# Patient Record
Sex: FEMALE | Race: WHITE | Employment: OTHER | URBAN - METROPOLITAN AREA
[De-identification: names, ages, dates, MRNs, and addresses within clinical notes are randomized per-mention and may not be internally consistent; named-entity substitution may affect disease eponyms.]

---

## 2017-06-01 ENCOUNTER — OFFICE VISIT (OUTPATIENT)
Dept: FAMILY MEDICINE CLINIC | Age: 82
End: 2017-06-01
Payer: COMMERCIAL

## 2017-06-01 VITALS
DIASTOLIC BLOOD PRESSURE: 86 MMHG | HEIGHT: 62 IN | HEART RATE: 70 BPM | OXYGEN SATURATION: 97 % | WEIGHT: 189.4 LBS | SYSTOLIC BLOOD PRESSURE: 134 MMHG | BODY MASS INDEX: 34.85 KG/M2

## 2017-06-01 DIAGNOSIS — G89.29 CHRONIC LEFT SI JOINT PAIN: ICD-10-CM

## 2017-06-01 DIAGNOSIS — M79.645 THUMB PAIN, LEFT: Primary | ICD-10-CM

## 2017-06-01 DIAGNOSIS — M53.3 CHRONIC LEFT SI JOINT PAIN: ICD-10-CM

## 2017-06-01 PROCEDURE — 99213 OFFICE O/P EST LOW 20 MIN: CPT | Performed by: FAMILY MEDICINE

## 2017-06-01 RX ORDER — PREDNISONE 50 MG/1
50 TABLET ORAL DAILY
Qty: 7 TABLET | Refills: 0 | Status: SHIPPED | OUTPATIENT
Start: 2017-06-01 | End: 2017-06-08

## 2017-06-01 ASSESSMENT — ENCOUNTER SYMPTOMS
RHINORRHEA: 0
EYE DISCHARGE: 0
WHEEZING: 0
COLOR CHANGE: 0
PHOTOPHOBIA: 0
ABDOMINAL DISTENTION: 0
SORE THROAT: 0
CONSTIPATION: 0
ABDOMINAL PAIN: 0
CHEST TIGHTNESS: 0
NAUSEA: 0
EYE REDNESS: 0
VOICE CHANGE: 0
BACK PAIN: 1
EYE PAIN: 0
EYE ITCHING: 0
SHORTNESS OF BREATH: 0
DIARRHEA: 0
FACIAL SWELLING: 0
COUGH: 0
BLOOD IN STOOL: 0
VOMITING: 0
SINUS PRESSURE: 0

## 2017-06-01 ASSESSMENT — PATIENT HEALTH QUESTIONNAIRE - PHQ9
SUM OF ALL RESPONSES TO PHQ9 QUESTIONS 1 & 2: 0
2. FEELING DOWN, DEPRESSED OR HOPELESS: 0
1. LITTLE INTEREST OR PLEASURE IN DOING THINGS: 0
SUM OF ALL RESPONSES TO PHQ QUESTIONS 1-9: 0

## 2017-06-21 RX ORDER — OMEPRAZOLE 20 MG/1
20 CAPSULE, DELAYED RELEASE ORAL DAILY
Qty: 90 CAPSULE | Refills: 3 | Status: SHIPPED | OUTPATIENT
Start: 2017-06-21 | End: 2018-02-23 | Stop reason: SDUPTHER

## 2017-06-28 LAB
CHOLESTEROL, TOTAL: 127 MG/DL
CHOLESTEROL/HDL RATIO: 3.7
HDLC SERPL-MCNC: 34 MG/DL (ref 35–70)
LDL CHOLESTEROL CALCULATED: 68 MG/DL (ref 0–160)
TRIGL SERPL-MCNC: 126 MG/DL
VLDLC SERPL CALC-MCNC: 25 MG/DL

## 2017-12-06 DIAGNOSIS — Z00.00 ROUTINE ADULT HEALTH MAINTENANCE: ICD-10-CM

## 2017-12-06 DIAGNOSIS — M19.90 OSTEOARTHRITIS, UNSPECIFIED OSTEOARTHRITIS TYPE, UNSPECIFIED SITE: ICD-10-CM

## 2017-12-06 DIAGNOSIS — Z13.820 SCREENING FOR OSTEOPOROSIS: ICD-10-CM

## 2017-12-06 DIAGNOSIS — Z12.31 SCREENING MAMMOGRAM, ENCOUNTER FOR: ICD-10-CM

## 2017-12-06 DIAGNOSIS — Z92.89 HX OF MAMMOGRAM: Primary | ICD-10-CM

## 2017-12-06 DIAGNOSIS — M89.9 DISORDER OF BONE: ICD-10-CM

## 2018-01-12 DIAGNOSIS — M19.90 OSTEOARTHRITIS, UNSPECIFIED OSTEOARTHRITIS TYPE, UNSPECIFIED SITE: ICD-10-CM

## 2018-01-12 DIAGNOSIS — Z13.820 SCREENING FOR OSTEOPOROSIS: ICD-10-CM

## 2018-01-12 DIAGNOSIS — M89.9 DISORDER OF BONE: ICD-10-CM

## 2018-01-12 DIAGNOSIS — Z00.00 ROUTINE ADULT HEALTH MAINTENANCE: ICD-10-CM

## 2018-02-26 RX ORDER — OMEPRAZOLE 20 MG/1
CAPSULE, DELAYED RELEASE ORAL
Qty: 90 CAPSULE | Refills: 3 | Status: SHIPPED | OUTPATIENT
Start: 2018-02-26 | End: 2019-02-06 | Stop reason: SDUPTHER

## 2018-07-26 ENCOUNTER — OFFICE VISIT (OUTPATIENT)
Dept: FAMILY MEDICINE CLINIC | Age: 83
End: 2018-07-26
Payer: COMMERCIAL

## 2018-07-26 VITALS
HEART RATE: 76 BPM | DIASTOLIC BLOOD PRESSURE: 74 MMHG | HEIGHT: 62 IN | OXYGEN SATURATION: 96 % | BODY MASS INDEX: 33.71 KG/M2 | WEIGHT: 183.2 LBS | SYSTOLIC BLOOD PRESSURE: 126 MMHG

## 2018-07-26 DIAGNOSIS — M19.90 OSTEOARTHRITIS, UNSPECIFIED OSTEOARTHRITIS TYPE, UNSPECIFIED SITE: ICD-10-CM

## 2018-07-26 DIAGNOSIS — Z00.00 MEDICARE ANNUAL WELLNESS VISIT, SUBSEQUENT: Primary | ICD-10-CM

## 2018-07-26 DIAGNOSIS — Z51.81 MEDICATION MONITORING ENCOUNTER: ICD-10-CM

## 2018-07-26 DIAGNOSIS — Z00.00 ROUTINE GENERAL MEDICAL EXAMINATION AT A HEALTH CARE FACILITY: ICD-10-CM

## 2018-07-26 DIAGNOSIS — E78.2 MIXED HYPERLIPIDEMIA: ICD-10-CM

## 2018-07-26 PROCEDURE — G0438 PPPS, INITIAL VISIT: HCPCS | Performed by: FAMILY MEDICINE

## 2018-07-26 RX ORDER — MELOXICAM 7.5 MG/1
7.5 TABLET ORAL DAILY
Qty: 90 TABLET | Refills: 3 | Status: SHIPPED | OUTPATIENT
Start: 2018-07-26 | End: 2019-07-25 | Stop reason: SDUPTHER

## 2018-07-26 ASSESSMENT — ENCOUNTER SYMPTOMS
VOICE CHANGE: 0
ABDOMINAL DISTENTION: 0
CONSTIPATION: 0
VOMITING: 0
WHEEZING: 0
CHEST TIGHTNESS: 0
NAUSEA: 0
DIARRHEA: 0
COUGH: 0
ABDOMINAL PAIN: 0
EYE PAIN: 0
SHORTNESS OF BREATH: 0
PHOTOPHOBIA: 0
EYE ITCHING: 0
BACK PAIN: 0
COLOR CHANGE: 0
BLOOD IN STOOL: 0
EYE REDNESS: 0
EYE DISCHARGE: 0
SORE THROAT: 0
SINUS PRESSURE: 0
FACIAL SWELLING: 0
RHINORRHEA: 0

## 2018-07-26 ASSESSMENT — PATIENT HEALTH QUESTIONNAIRE - PHQ9
2. FEELING DOWN, DEPRESSED OR HOPELESS: 0
1. LITTLE INTEREST OR PLEASURE IN DOING THINGS: 0
SUM OF ALL RESPONSES TO PHQ9 QUESTIONS 1 & 2: 0
SUM OF ALL RESPONSES TO PHQ QUESTIONS 1-9: 0

## 2018-07-26 NOTE — PROGRESS NOTES
Subjective:      Patient ID: Yared Swenson is a 80 y.o. female. HPI  Here annual well check and has been generally well and healthy aside from her back pain which has been chronic and is worse when she has been on her feet for long periods. She has been having some limitations in her ADLs because of the back pain and has not had much benefit from PT, Yoga, and pool therapy over the past few weeks. She really doesn't see much relief from tylenol over the counter. Sleep has been restful and she has been maintaining a reasonable diet and has been staying active and well hydrated. Review of Systems   Constitutional: Negative for activity change, appetite change, chills, diaphoresis, fatigue, fever and unexpected weight change. HENT: Negative for congestion, ear pain, facial swelling, hearing loss, postnasal drip, rhinorrhea, sinus pressure, sore throat and voice change. Eyes: Negative for photophobia, pain, discharge, redness, itching and visual disturbance. Respiratory: Negative for cough, chest tightness, shortness of breath and wheezing. Cardiovascular: Negative for chest pain and palpitations. Gastrointestinal: Negative for abdominal distention, abdominal pain, blood in stool, constipation, diarrhea, nausea and vomiting. Endocrine: Negative for cold intolerance and heat intolerance. Genitourinary: Negative for decreased urine volume, difficulty urinating, dysuria, flank pain, frequency, hematuria, pelvic pain, urgency, vaginal bleeding and vaginal discharge. Musculoskeletal: Negative for arthralgias, back pain, gait problem, joint swelling, myalgias, neck pain and neck stiffness. Skin: Negative for color change, pallor and rash. Allergic/Immunologic: Negative for environmental allergies and food allergies. Neurological: Negative for dizziness, tremors, seizures, syncope, facial asymmetry, speech difficulty, weakness, numbness and headaches.    Psychiatric/Behavioral: psychosocial and functional/safety risks, and cognitive dysfunction are all negative except as indicated below. These results, as well as other patient data from the [address] form, are documented in Flowsheets linked to this Encounter. No Known Allergies  Prior to Visit Medications    Medication Sig Taking? Authorizing Provider   meloxicam (MOBIC) 7.5 MG tablet Take 1 tablet by mouth daily Yes Adi Magdaleno MD   omeprazole (PRILOSEC) 20 MG delayed release capsule take 1 capsule by mouth once daily Yes Adi Magdaleno MD   atorvastatin (LIPITOR) 20 MG tablet Take 1 tablet by mouth nightly Yes Historical Provider, MD   metoprolol tartrate (LOPRESSOR) 25 MG tablet Take 1 tablet by mouth 2 times daily Yes Historical Provider, MD   MULTIPLE VITAMIN PO Take  by mouth daily. Yes Historical Provider, MD   calcium carbonate (OSCAL) 500 MG TABS tablet Take 500 mg by mouth daily. Yes Historical Provider, MD     No past medical history on file. Past Surgical History:   Procedure Laterality Date    MITRAL VALVE SURGERY  3/14/2016    [facility]/Dr. Syeda Schmidt     No family history on file.     CareTeam (Including outside providers/suppliers regularly involved in providing care):   Patient Care Team:  Adi Magdaleno MD as PCP - General    Wt Readings from Last 3 Encounters:   07/26/18 183 lb 3.2 oz (83.1 kg)   06/01/17 189 lb 6.4 oz (85.9 kg)   08/17/16 178 lb 6.4 oz (80.9 kg)     Vitals:    07/26/18 1129   BP: 126/74   Pulse: 76   SpO2: 96%   Weight: 183 lb 3.2 oz (83.1 kg)   Height: 5' 2\" (1.575 m)       General Appearance: alert and oriented to person, place and time, well developed and well- nourished, in no acute distress  Skin: warm and dry, no rash or erythema  Head: normocephalic and atraumatic  Eyes: pupils equal, round, and reactive to light, extraocular eye movements intact, conjunctivae normal  ENT: tympanic membrane, external ear and ear canal normal bilaterally, nose without deformity, nasal mucosa

## 2018-08-01 LAB
ALBUMIN SERPL-MCNC: 4.2 G/DL
ALP BLD-CCNC: 71 U/L
ALT SERPL-CCNC: 22 U/L
ANION GAP SERPL CALCULATED.3IONS-SCNC: 9 MMOL/L
AST SERPL-CCNC: 35 U/L
BILIRUB SERPL-MCNC: 0.6 MG/DL (ref 0.1–1.4)
BUN BLDV-MCNC: 16 MG/DL
CALCIUM SERPL-MCNC: 9.5 MG/DL
CHLORIDE BLD-SCNC: 102 MMOL/L
CHOLESTEROL, TOTAL: 122 MG/DL
CHOLESTEROL/HDL RATIO: 2.9
CO2: 28 MMOL/L
CREAT SERPL-MCNC: 0.94 MG/DL
GFR CALCULATED: NORMAL
GLUCOSE BLD-MCNC: 99 MG/DL
HDLC SERPL-MCNC: 42 MG/DL (ref 35–70)
LDL CHOLESTEROL CALCULATED: 64 MG/DL (ref 0–160)
POTASSIUM SERPL-SCNC: 4.7 MMOL/L
SODIUM BLD-SCNC: 139 MMOL/L
TOTAL PROTEIN: 6.9
TRIGL SERPL-MCNC: 80 MG/DL
TSH SERPL DL<=0.05 MIU/L-ACNC: 2.24 UIU/ML
VLDLC SERPL CALC-MCNC: 16 MG/DL

## 2018-08-06 DIAGNOSIS — Z00.00 MEDICARE ANNUAL WELLNESS VISIT, SUBSEQUENT: ICD-10-CM

## 2018-08-06 DIAGNOSIS — Z51.81 MEDICATION MONITORING ENCOUNTER: ICD-10-CM

## 2018-08-06 DIAGNOSIS — E78.2 MIXED HYPERLIPIDEMIA: ICD-10-CM

## 2019-02-06 RX ORDER — OMEPRAZOLE 20 MG/1
CAPSULE, DELAYED RELEASE ORAL
Qty: 90 CAPSULE | Refills: 3 | Status: SHIPPED | OUTPATIENT
Start: 2019-02-06 | End: 2020-04-21 | Stop reason: SDUPTHER

## 2019-04-15 ENCOUNTER — OFFICE VISIT (OUTPATIENT)
Dept: FAMILY MEDICINE CLINIC | Age: 84
End: 2019-04-15
Payer: COMMERCIAL

## 2019-04-15 VITALS
HEART RATE: 59 BPM | OXYGEN SATURATION: 98 % | SYSTOLIC BLOOD PRESSURE: 180 MMHG | BODY MASS INDEX: 32.34 KG/M2 | WEIGHT: 176.8 LBS | DIASTOLIC BLOOD PRESSURE: 74 MMHG

## 2019-04-15 DIAGNOSIS — Z01.818 PREOPERATIVE GENERAL PHYSICAL EXAMINATION: Primary | ICD-10-CM

## 2019-04-15 PROCEDURE — G0439 PPPS, SUBSEQ VISIT: HCPCS | Performed by: NURSE PRACTITIONER

## 2019-04-15 ASSESSMENT — PATIENT HEALTH QUESTIONNAIRE - PHQ9
SUM OF ALL RESPONSES TO PHQ9 QUESTIONS 1 & 2: 0
1. LITTLE INTEREST OR PLEASURE IN DOING THINGS: 0
2. FEELING DOWN, DEPRESSED OR HOPELESS: 0
SUM OF ALL RESPONSES TO PHQ QUESTIONS 1-9: 0
SUM OF ALL RESPONSES TO PHQ QUESTIONS 1-9: 0

## 2019-04-15 NOTE — PROGRESS NOTES
Jeff Lawrence, MAURA-CRISTINA  [address]  [id number] [facility] [address] [facility]  [phone]  [phone]    Christie Nieto is a 80 y.o. female who is here with c/o of:    Chief Complaint: Pre-op Exam (Right knee replacement surgery 5/2/19 @[place] [facility] w/ Santana Riggins)      Patient Accompanied by: patient    HPI - Christie Nieto is here today for pre-op physical examination:    Patient is scheduled to have right knee surgery scheduled for 5/2/2019. She reports appointment with cardiology - Dr. Radha Rosario tomorrow- 4/18 for cardiac clearance. She has appointment for pre-op visit for labs and ekg within the next week. Patient has long standing hx of right knee pain caused by osteoarthritis and is under the care of America Sorenson MD-orthopaedics. She has the following chronic conditions hypertension, GERD, osteoarthritis. She is under the care of cardiology for her hypertension, which is currently controlled. She reports heart burn that is controlled and is taking omeprazole 20mg. Patient Active Problem List:     OA (osteoarthritis)     HTN (hypertension)     GERD (gastroesophageal reflux disease)     Gastritis     No past medical history on file. Past Surgical History:   Procedure Laterality Date    MITRAL VALVE SURGERY  3/14/2016    [facility]/Dr. Meet Bean     No family history on file. Social History     Tobacco Use    Smoking status: Never Smoker    Smokeless tobacco: Never Used   Substance Use Topics    Alcohol use: No     ALLERGIES:  No Known Allergies       Subjective     · Constitutional:  Negative for activity change, appetite change,unexpected weight change, chills, fever, and fatigue. · HENT: Negative for ear pain, sore throat,  Rhinorrhea, sinus pain, sinus pressure, congestion. · Eyes:  Negative for pain and discharge. · Respiratory:  Negative for chest tightness, shortness of breath, wheezing, and cough.     · Cardiovascular:  Negative for chest pain, palpitations and leg swelling. · Gastrointestinal: Negative for abdominal pain, blood in stool, constipation,diarrhea, nausea and vomiting. Positive for heart burn  · Endocrine: Negative for cold intolerance, heat intolerance, polydipsia, polyphagia and polyuria. · Genitourinary: Negative for difficulty urinating, dysuria, flank pain, frequency, hematuria and urgency. · Musculoskeletal: Negative for arthralgias, back pain, joint swelling, myalgias, neck pain and neck stiffness. Positive for right knee pain  · Skin: Negative for rash and wound. · Allergic/Immunologic: Negative for environmental allergies and food allergies. · Neurological:  Negative for dizziness, light-headedness, numbness and headaches. · Hematological:  Negative for adenopathy. Does not bruise/bleed easily. · Psychiatric/Behavioral: Negative for self-injury, sleep disturbance and suicidal ideas. Objective     PHYSICAL EXAM:   · Constitutional: Jourdan Antunez is oriented to person, place, and time. Vital signs are normal. Appears well-developed and well-nourished. · HEENT:   · Head: Normocephalic and atraumatic. Right Ear: Hearing and external ear normal. TM normal  Canal normal  · Left Ear: Hearing and external ear normal. TM normal Canal normal  · Nose: Nares normal. Septum midline. No drainage or sinus tenderness. Mucosa pink and moist  · Mouth/Throat: Oropharynx- No erythema, no exudate. Uvula midline, no erythema, no edema. Mucous membranes are pink and moist.   · Eyes:PERRL, EOMI, Conjunctiva normal, No discharge. · Neck: Full passive range of motion. Non-tender on palpation. Neck supple. No thyromegaly present. Trachea normal.  · Cardiovascular: Normal rate, regular rhythm, S1, S2, no murmur, no gallop, no friction rub, intact distal pulses. · Pulmonary/Chest: Breath sounds are clear throughout, No respiratory distress, No wheezing, No chest tenderness. Effort normal  · Abdominal: Soft.  Normal appearance, bowel sounds are present and normoactive. There is no hepatosplenomegaly. There is no tenderness. There is no CVA tenderness. · Musculoskeletal: Extremities appear regular and symmetric. No evident masses, lesions, foreign bodies, or other abnormalities. No edema. No tenderness on palpation. Joints are stable. Full ROM, strength and tone are within normal limits. · Lymphadenopathy: No lymphadenopathy noted. · Neurological: Alert and oriented to person, place, and time. Normal motor function, Normal sensory function, No focal deficits noted. He has normal strength. · Skin: Skin is warm, dry and intact. No obvious lesions on exposed skin  · Psychiatric: Normal mood and affect. Speech is normal and behavior is normal.       Nursing note and vitals reviewed. Blood pressure (!) 180/74, pulse 59, weight 176 lb 12.8 oz (80.2 kg), SpO2 98 %. Body mass index is 32.34 kg/m². Wt Readings from Last 3 Encounters:   04/15/19 176 lb 12.8 oz (80.2 kg)   07/26/18 183 lb 3.2 oz (83.1 kg)   06/01/17 189 lb 6.4 oz (85.9 kg)     BP Readings from Last 3 Encounters:   04/15/19 (!) 180/74   07/26/18 126/74   06/01/17 134/86       No results found for this visit on 04/15/19. Completed Orders/Prescriptions   No orders of the defined types were placed in this encounter. AssessmentPlan/Medical Decision Making     1. Preoperative general physical examination  - preoperative clearance will be provided upon receipt of labs, ekg, and cardiac clearance. Patient is to have these forwarded to me as soon as possible. Return if symptoms worsen or fail to improve. 1.  Lori Mueller received counseling on the following healthy behaviors: nutrition, exercise and medication adherence  2. Patient given educational materials - see patient instructions  3. Was a self-tracking handout given in paper form or via [employer]? No  If yes, see orders or list here. 4.  Discussed use, benefit, and side effects of prescribed medications.   Barriers to

## 2019-04-15 NOTE — PROGRESS NOTES
Visit Information    Have you changed or started any medications since your last visit including any over-the-counter medicines, vitamins, or herbal medicines? no   Have you stopped taking any of your medications? Is so, why? -  no  Are you having any side effects from any of your medications? - no    Have you seen any other physician or provider since your last visit? yes - Shamir Stacks   Have you had any other diagnostic tests since your last visit?  no   Have you been seen in the emergency room and/or had an admission in a hospital since we last saw you?  no   Have you had your routine dental cleaning in the past 6 months? Do you have an active [employer] account? If no, what is the barrier?   No: inactive    Patient Care Team:  Migdalia Wright MD as PCP - General    Medical History Review  Past Medical, Family, and Social History reviewed and contribute to the patient presenting condition    Health Maintenance   Topic Date Due    DTaP/Tdap/Td vaccine (1 - Tdap) 06/11/1953    Shingles Vaccine (1 of 2) 06/11/1984    Flu vaccine (Season Ended) 09/01/2019    DEXA (modify frequency per FRAX score)  Completed    Pneumococcal 65+ years Vaccine  Completed

## 2019-05-13 RX ORDER — ATORVASTATIN CALCIUM 20 MG/1
TABLET, FILM COATED ORAL NIGHTLY
Qty: 90 TABLET | Refills: 3 | Status: SHIPPED | OUTPATIENT
Start: 2019-05-13 | End: 2020-05-12 | Stop reason: SDUPTHER

## 2019-05-13 RX ORDER — ATORVASTATIN CALCIUM 20 MG/1
1 TABLET, FILM COATED ORAL NIGHTLY
Qty: 90 TABLET | Refills: 11 | Status: CANCELLED | OUTPATIENT
Start: 2019-05-13

## 2019-05-13 NOTE — TELEPHONE ENCOUNTER
Next Visit Date:  No future appointments.     Health Maintenance   Topic Date Due    DTaP/Tdap/Td vaccine (1 - Tdap) 06[birth year]/1953    Shingles Vaccine (1 of 2) 06/11/1984    Flu vaccine (Season Ended) 09/01/2019    DEXA (modify frequency per FRAX score)  Completed    Pneumococcal 65+ years Vaccine  Completed       No results found for: LABA1C          ( goal A1C is < 7)   No results found for: LABMICR  LDL Calculated (mg/dL)   Date Value   08/01/2018 64   06/28/2017 68       (goal LDL is <100)   AST (U/L)   Date Value   08/01/2018 35     ALT (U/L)   Date Value   08/01/2018 22     BUN (mg/dL)   Date Value   08/01/2018 16     BP Readings from Last 3 Encounters:   04/15/19 (!) 180/74   07/26/18 126/74   06/01/17 134/86          (goal 120/80)    All Future Testing planned in [facility]  Lab Frequency Next Occurrence               Patient Active Problem List:     OA (osteoarthritis)     HTN (hypertension)     GERD (gastroesophageal reflux disease)

## 2019-07-25 DIAGNOSIS — Z00.00 MEDICARE ANNUAL WELLNESS VISIT, SUBSEQUENT: ICD-10-CM

## 2019-07-25 DIAGNOSIS — M19.90 OSTEOARTHRITIS, UNSPECIFIED OSTEOARTHRITIS TYPE, UNSPECIFIED SITE: ICD-10-CM

## 2019-07-26 RX ORDER — MELOXICAM 7.5 MG/1
TABLET ORAL
Qty: 90 TABLET | Refills: 3 | Status: SHIPPED | OUTPATIENT
Start: 2019-07-26 | End: 2020-05-12

## 2020-04-21 NOTE — TELEPHONE ENCOUNTER
Last visit: 4/15/19  Last Med refill: 2/6/19  Does patient have enough medication for 72 hours: Yes    Next Visit Date:  No future appointments.     Health Maintenance   Topic Date Due    DTaP/Tdap/Td vaccine (1 - Tdap) 06[birth year]11/1953    Shingles Vaccine (1 of 2) 06/11/1984    Annual Wellness Visit (AWV)  05/29/2019    Lipid screen  08/01/2019    Flu vaccine (Season Ended) 09/01/2020    DEXA (modify frequency per FRAX score)  Completed    Pneumococcal 65+ years Vaccine  Completed    Hepatitis A vaccine  Aged Out    Hepatitis B vaccine  Aged Out    Hib vaccine  Aged Out    Meningococcal (ACWY) vaccine  Aged Out       No results found for: LABA1C          ( goal A1C is < 7)   No results found for: LABMICR  LDL Calculated (mg/dL)   Date Value   08/01/2018 64   06/28/2017 68       (goal LDL is <100)   AST (U/L)   Date Value   08/01/2018 35     ALT (U/L)   Date Value   08/01/2018 22     BUN (mg/dL)   Date Value   08/01/2018 16     BP Readings from Last 3 Encounters:   04/15/19 (!) 180/74   07/26/18 126/74   06/01/17 134/86          (goal 120/80)    All Future Testing planned in [facility]  Lab Frequency Next Occurrence               Patient Active Problem List:     OA (osteoarthritis)     HTN (hypertension)     GERD (gastroesophageal reflux disease)

## 2020-04-23 RX ORDER — OMEPRAZOLE 20 MG/1
20 CAPSULE, DELAYED RELEASE ORAL DAILY
Qty: 30 CAPSULE | Refills: 0 | Status: SHIPPED | OUTPATIENT
Start: 2020-04-23 | End: 2020-05-12 | Stop reason: SDUPTHER

## 2020-05-11 RX ORDER — LOSARTAN POTASSIUM 25 MG/1
TABLET ORAL DAILY
COMMUNITY
Start: 2020-04-11 | End: 2020-05-12 | Stop reason: SDUPTHER

## 2020-05-11 ASSESSMENT — PATIENT HEALTH QUESTIONNAIRE - PHQ9
SUM OF ALL RESPONSES TO PHQ QUESTIONS 1-9: 0
SUM OF ALL RESPONSES TO PHQ QUESTIONS 1-9: 0
SUM OF ALL RESPONSES TO PHQ9 QUESTIONS 1 & 2: 0
2. FEELING DOWN, DEPRESSED OR HOPELESS: 0
1. LITTLE INTEREST OR PLEASURE IN DOING THINGS: 0

## 2020-05-12 ENCOUNTER — VIRTUAL VISIT (OUTPATIENT)
Dept: FAMILY MEDICINE CLINIC | Age: 85
End: 2020-05-12
Payer: COMMERCIAL

## 2020-05-12 PROCEDURE — 99443 PR PHYS/QHP TELEPHONE EVALUATION 21-30 MIN: CPT | Performed by: NURSE PRACTITIONER

## 2020-05-12 RX ORDER — LOSARTAN POTASSIUM 25 MG/1
25 TABLET ORAL DAILY
Qty: 90 TABLET | Refills: 1 | Status: SHIPPED | OUTPATIENT
Start: 2020-05-12 | End: 2020-08-12 | Stop reason: SDUPTHER

## 2020-05-12 RX ORDER — ATORVASTATIN CALCIUM 20 MG/1
20 TABLET, FILM COATED ORAL NIGHTLY
Qty: 90 TABLET | Refills: 0 | Status: SHIPPED | OUTPATIENT
Start: 2020-05-12 | End: 2020-08-17

## 2020-05-12 RX ORDER — OMEPRAZOLE 20 MG/1
20 CAPSULE, DELAYED RELEASE ORAL DAILY
Qty: 90 CAPSULE | Refills: 0 | Status: SHIPPED | OUTPATIENT
Start: 2020-05-12 | End: 2020-08-07

## 2020-05-12 NOTE — PROGRESS NOTES
to this being a TeleHealth encounter (During JPPZK-11 public health emergency), evaluation of the following organ systems was limited: Vitals/Constitutional/EENT/Resp/CV/GI//MS/Neuro/Skin/Heme-Lymph-Imm. Pursuant to the emergency declaration under the [address] [place] [address], [address] [place] [address] and the [employer] Act, this Virtual Visit was conducted with patient's (and/or legal guardian's) consent, to reduce the patient's risk of exposure to COVID-19 and provide necessary medical care. The patient (and/or legal guardian) has also been advised to contact this office for worsening conditions or problems, and seek emergency medical treatment and/or call 911 if deemed necessary. Services were provided through a  telephone discussion virtually to substitute for in-person clinic visit. Patient and provider were located at their individual homes. --WEI Ma CNP on 5/12/2020 at 7:22 PM    An electronic signature was used to authenticate this note. I affirm this is a Patient Initiated Episode with an Established Patient who has not had a related appointment within my department in the past 7 days or scheduled within the next 24 hours.     Total Time: minutes: 21-30 minutes    Note: not billable if this call serves to triage the patient into an appointment for the relevant concern    Electronically signed by WEI Ma CNP on 5/12/2020 at 7:22 PM

## 2020-05-18 ENCOUNTER — TELEPHONE (OUTPATIENT)
Dept: FAMILY MEDICINE CLINIC | Age: 85
End: 2020-05-18

## 2020-05-18 NOTE — TELEPHONE ENCOUNTER
----- Message from WEI Ma CNP sent at 5/12/2020  7:22 PM EDT -----  Please call patient to get blood pressure. If she does not have this, please offer to provide order for b/p cuff.

## 2020-05-18 NOTE — TELEPHONE ENCOUNTER
Spoke with patient she states she does have a cuff but it is not working so she is just going to go to [facility] and get another one then call with BP.

## 2020-05-20 ENCOUNTER — TELEPHONE (OUTPATIENT)
Dept: FAMILY MEDICINE CLINIC | Age: 85
End: 2020-05-20

## 2020-06-04 ENCOUNTER — HOSPITAL ENCOUNTER (OUTPATIENT)
Age: 85
Setting detail: SPECIMEN
Discharge: HOME | End: 2020-06-04
Payer: COMMERCIAL

## 2020-06-04 LAB
ABSOLUTE EOS #: 0.13 K/UL (ref 0–0.44)
ABSOLUTE IMMATURE GRANULOCYTE: 0.03 K/UL (ref 0–0.3)
ABSOLUTE LYMPH #: 1.02 K/UL (ref 1.1–3.7)
ABSOLUTE MONO #: 0.6 K/UL (ref 0.1–1.2)
ALBUMIN SERPL-MCNC: 4.2 G/DL (ref 3.5–5.2)
ALBUMIN/GLOBULIN RATIO: 1.5 (ref 1–2.5)
ALP BLD-CCNC: 79 U/L (ref 35–104)
ALT SERPL-CCNC: 20 U/L (ref 5–33)
ANION GAP SERPL CALCULATED.3IONS-SCNC: 14 MMOL/L (ref 9–17)
AST SERPL-CCNC: 34 U/L
BASOPHILS # BLD: 0 % (ref 0–2)
BASOPHILS ABSOLUTE: <0.03 K/UL (ref 0–0.2)
BILIRUB SERPL-MCNC: 0.47 MG/DL (ref 0.3–1.2)
BUN BLDV-MCNC: 13 MG/DL (ref 8–23)
BUN/CREAT BLD: ABNORMAL (ref 9–20)
CALCIUM SERPL-MCNC: 9.5 MG/DL (ref 8.6–10.4)
CHLORIDE BLD-SCNC: 101 MMOL/L (ref 98–107)
CHOLESTEROL, FASTING: 130 MG/DL
CHOLESTEROL/HDL RATIO: 3
CO2: 24 MMOL/L (ref 20–31)
CREAT SERPL-MCNC: 0.79 MG/DL (ref 0.5–0.9)
DIFFERENTIAL TYPE: ABNORMAL
EOSINOPHILS RELATIVE PERCENT: 2 % (ref 1–4)
GFR AFRICAN AMERICAN: >60 ML/MIN
GFR NON-AFRICAN AMERICAN: >60 ML/MIN
GFR SERPL CREATININE-BSD FRML MDRD: ABNORMAL ML/MIN/{1.73_M2}
GFR SERPL CREATININE-BSD FRML MDRD: ABNORMAL ML/MIN/{1.73_M2}
GLUCOSE BLD-MCNC: 88 MG/DL (ref 70–99)
HCT VFR BLD CALC: 38.8 % (ref 36.3–47.1)
HDLC SERPL-MCNC: 44 MG/DL
HEMOGLOBIN: 12.2 G/DL (ref 11.9–15.1)
IMMATURE GRANULOCYTES: 1 %
LDL CHOLESTEROL: 68 MG/DL (ref 0–130)
LYMPHOCYTES # BLD: 18 % (ref 24–43)
MCH RBC QN AUTO: 27.9 PG (ref 25.2–33.5)
MCHC RBC AUTO-ENTMCNC: 31.4 G/DL (ref 28.4–34.8)
MCV RBC AUTO: 88.8 FL (ref 82.6–102.9)
MONOCYTES # BLD: 11 % (ref 3–12)
NRBC AUTOMATED: 0 PER 100 WBC
PDW BLD-RTO: 14.4 % (ref 11.8–14.4)
PLATELET # BLD: 206 K/UL (ref 138–453)
PLATELET ESTIMATE: ABNORMAL
PMV BLD AUTO: 11.1 FL (ref 8.1–13.5)
POTASSIUM SERPL-SCNC: 5.4 MMOL/L (ref 3.7–5.3)
RBC # BLD: 4.37 M/UL (ref 3.95–5.11)
RBC # BLD: ABNORMAL 10*6/UL
SEG NEUTROPHILS: 68 % (ref 36–65)
SEGMENTED NEUTROPHILS ABSOLUTE COUNT: 3.94 K/UL (ref 1.5–8.1)
SODIUM BLD-SCNC: 139 MMOL/L (ref 135–144)
TOTAL PROTEIN: 7 G/DL (ref 6.4–8.3)
TRIGLYCERIDE, FASTING: 90 MG/DL
VLDLC SERPL CALC-MCNC: NORMAL MG/DL (ref 1–30)
WBC # BLD: 5.7 K/UL (ref 3.5–11.3)
WBC # BLD: ABNORMAL 10*3/UL

## 2020-06-17 NOTE — TELEPHONE ENCOUNTER
Next Visit Date:  No future appointments.     Health Maintenance   Topic Date Due    DTaP/Tdap/Td vaccine (1 - Tdap) 05/12/2021 (Originally 6/11/1953)    Lipid screen  06/04/2021    Potassium monitoring  06/04/2021    Creatinine monitoring  06/04/2021    Flu vaccine  Completed    Shingles Vaccine  Completed    Pneumococcal 65+ years Vaccine  Completed    Hepatitis A vaccine  Aged Out    Hepatitis B vaccine  Aged Out    Hib vaccine  Aged Out    Meningococcal (ACWY) vaccine  Aged Out       No results found for: LABA1C          ( goal A1C is < 7)   No results found for: LABMICR  LDL Cholesterol (mg/dL)   Date Value   06/04/2020 68     LDL Calculated (mg/dL)   Date Value   08/01/2018 64   06/28/2017 68       (goal LDL is <100)   AST (U/L)   Date Value   06/04/2020 34 (H)     ALT (U/L)   Date Value   06/04/2020 20     BUN (mg/dL)   Date Value   06/04/2020 13     BP Readings from Last 3 Encounters:   04/15/19 (!) 180/74   07/26/18 126/74   06/01/17 134/86          (goal 120/80)    All Future Testing planned in [facility]  Lab Frequency Next Occurrence               Patient Active Problem List:     OA (osteoarthritis)     HTN (hypertension)     GERD (gastroesophageal reflux disease)

## 2020-08-12 ENCOUNTER — OFFICE VISIT (OUTPATIENT)
Dept: FAMILY MEDICINE CLINIC | Age: 85
End: 2020-08-12
Payer: COMMERCIAL

## 2020-08-12 VITALS
HEART RATE: 67 BPM | WEIGHT: 189.4 LBS | HEIGHT: 63 IN | OXYGEN SATURATION: 97 % | TEMPERATURE: 97.8 F | SYSTOLIC BLOOD PRESSURE: 154 MMHG | DIASTOLIC BLOOD PRESSURE: 90 MMHG | BODY MASS INDEX: 33.56 KG/M2

## 2020-08-12 PROCEDURE — 99214 OFFICE O/P EST MOD 30 MIN: CPT | Performed by: NURSE PRACTITIONER

## 2020-08-12 RX ORDER — ASPIRIN 81 MG/1
81 TABLET ORAL DAILY
COMMUNITY

## 2020-08-12 RX ORDER — LOSARTAN POTASSIUM 50 MG/1
25 TABLET ORAL DAILY
Qty: 90 TABLET | Refills: 0 | Status: SHIPPED | OUTPATIENT
Start: 2020-08-12 | End: 2020-08-13 | Stop reason: SDUPTHER

## 2020-08-12 RX ORDER — MELOXICAM 7.5 MG/1
7.5 TABLET ORAL DAILY PRN
Qty: 90 TABLET | Refills: 0 | Status: SHIPPED | OUTPATIENT
Start: 2020-08-12 | End: 2020-11-12 | Stop reason: SDUPTHER

## 2020-08-12 SDOH — ECONOMIC STABILITY: FOOD INSECURITY: WITHIN THE PAST 12 MONTHS, YOU WORRIED THAT YOUR FOOD WOULD RUN OUT BEFORE YOU GOT MONEY TO BUY MORE.: NEVER TRUE

## 2020-08-12 SDOH — ECONOMIC STABILITY: FOOD INSECURITY: WITHIN THE PAST 12 MONTHS, THE FOOD YOU BOUGHT JUST DIDN'T LAST AND YOU DIDN'T HAVE MONEY TO GET MORE.: NEVER TRUE

## 2020-08-12 SDOH — ECONOMIC STABILITY: INCOME INSECURITY: HOW HARD IS IT FOR YOU TO PAY FOR THE VERY BASICS LIKE FOOD, HOUSING, MEDICAL CARE, AND HEATING?: NOT HARD AT ALL

## 2020-08-12 NOTE — PROGRESS NOTES
Shawn Billy, YENNIP-C  [address]  [id number] [facility] [place]. [facility], [facility] [address]  [phone]  [phone]    Melchor Helms is a 80 y.o. female who is here with c/o of:    Chief Complaint: Hypertension      Patient Accompanied by: n/a    HPI - Melchor Helms is here today for f/u:    HTN   - patient admits that she has been eating a lot of chips lately and has not been as physically active as she goes to the [facility] and won't go in some of the areas as people are not wearing masks and she is afraid of valeri the COVID virus   - she currently denies h/a, c/p, palpitations, sob, difficulty breathing, lower extremity edema   - current meds: metoprolol 25mg daily, losartan 25mg daily and reports compliance and denies adverse affects of the medications    Back pain   - this is a chronic condition of many years   - she reports generalized aching pain across her entire lower back with concentration on the left   - she denies radiation down either leg   - she has taken meloxicam 7.5mg for this in the past and felt that it helped some    CREATININE   Date Value Ref Range Status   06/04/2020 0.79 0.50 - 0.90 mg/dL Final     GFR Non-   Date Value Ref Range Status   06/04/2020 >60 >60 mL/min Final           Patient Active Problem List:     OA (osteoarthritis)     HTN (hypertension)     GERD (gastroesophageal reflux disease)     No past medical history on file. Past Surgical History:   Procedure Laterality Date    MITRAL VALVE SURGERY  3/14/2016    [facility]/Dr. Swetha Whyte     No family history on file. Social History     Tobacco Use    Smoking status: Never Smoker    Smokeless tobacco: Never Used   Substance Use Topics    Alcohol use: No     ALLERGIES:  No Known Allergies       Subjective     · Constitutional:  Negative for activity change, appetite change,unexpected weight change, chills, fever, and fatigue.     · HENT: Negative for ear pain, sore throat,  Rhinorrhea, sinus pain, tenderness. Musculoskeletal: Extremities appear regular and symmetric. No evident masses, lesions, foreign bodies, or other abnormalities. No edema. No tenderness on palpation. Joints are stable. Full ROM, strength and tone are within normal limits. Physical Exam  Musculoskeletal:      Lumbar back: She exhibits pain. She exhibits normal range of motion, no tenderness, no bony tenderness, no swelling, no edema, no deformity and no spasm. Back:      ·   · Lymphadenopathy: No lymphadenopathy noted. · Neurological: Alert and oriented to person, place, and time. Normal motor function, Normal sensory function, No focal deficits noted. He has normal strength. · Skin: Skin is warm, dry and intact. No obvious lesions on exposed skin  · Psychiatric: Normal mood and affect. Speech is normal and behavior is normal.     Nursing note and vitals reviewed. Blood pressure (!) 154/90, pulse 67, temperature 97.8 °F (36.6 °C), temperature source Temporal, height 5' 3\" (1.6 m), weight 189 lb 6.4 oz (85.9 kg), SpO2 97 %. Body mass index is 33.55 kg/m². Wt Readings from Last 3 Encounters:   08/12/20 189 lb 6.4 oz (85.9 kg)   04/15/19 176 lb 12.8 oz (80.2 kg)   07/26/18 183 lb 3.2 oz (83.1 kg)     BP Readings from Last 3 Encounters:   08/12/20 (!) 154/90   04/15/19 (!) 180/74   07/26/18 126/74       No results found for this visit on 08/12/20.     Completed Orders/Prescriptions   Orders Placed This Encounter   Medications    losartan (COZAAR) 50 MG tablet     Sig: Take 0.5 tablets by mouth daily     Dispense:  90 tablet     Refill:  0    meloxicam (MOBIC) 7.5 MG tablet     Sig: Take 1 tablet by mouth daily as needed for Pain     Dispense:  90 tablet     Refill:  0    diclofenac sodium (VOLTAREN) 1 % GEL     Sig: Apply 4 g topically 4 times daily     Dispense:  5 Tube     Refill:  0    metoprolol tartrate (LOPRESSOR) 25 MG tablet     Sig: Take 1 tablet by mouth 2 times daily     Dispense:  180 tablet     Refill:  0 AssessmentPlan/Medical Decision Making     1. Essential hypertension  - Not controlled  - reviewed DASH diet  - Increase losartan (COZAAR) 50 MG tablet; Take 0.5 tablets by mouth daily  Dispense: 90 tablet; Refill: 0  - metoprolol tartrate (LOPRESSOR) 25 MG tablet; Take 1 tablet by mouth 2 times daily  Dispense: 180 tablet; Refill: 0    2. Chronic bilateral low back pain without sciatica  - will need to monitor renal function  - meloxicam (MOBIC) 7.5 MG tablet; Take 1 tablet by mouth daily as needed for Pain  Dispense: 90 tablet; Refill: 0  - diclofenac sodium (VOLTAREN) 1 % GEL; Apply 4 g topically 4 times daily  Dispense: 5 Tube; Refill: 0      Return in about 3 months (around 11/12/2020). 1.  Isauro Dominguez received counseling on the following healthy behaviors: nutrition, exercise and medication adherence  2. Patient given educational materials - see patient instructions  3. Was a self-tracking handout given in paper form or via [employer]? No  If yes, see orders or list here. 4.  Discussed use, benefit, and side effects of prescribed medications. Barriers to medication compliance addressed. All patient questions answered. Pt voiced understanding. 5.  Reviewed prior labs, imaging, consultation, follow up, and health maintenance  6. Continue current medications, diet and exercise. 7. Discussed use, benefit, and side effects of prescribed medications. Barriers to medication compliance addressed. All her questions were answered. Pt voiced understanding. Isauro Dominguez will continue current medications, diet and exercise. Patient given educational materials on DASH diet    Of the 25 minute duration appointment visit, Yoel Duckworth CNP spent at least 50% of the face-to-face time in counseling, explanation of diagnosis, planning of further management, and answering all questions.     Signed:  Yoel Duckworth CNP    This note is created with the assistance of a speech-recognition program.  While intending to generate a document that actually reflects the content of the visit, no guarantees can be provided that every mistake has been identified and corrected by editing.

## 2020-08-13 ENCOUNTER — TELEPHONE (OUTPATIENT)
Dept: FAMILY MEDICINE CLINIC | Age: 85
End: 2020-08-13

## 2020-08-13 RX ORDER — LOSARTAN POTASSIUM 50 MG/1
50 TABLET ORAL DAILY
Qty: 90 TABLET | Refills: 0 | Status: SHIPPED | OUTPATIENT
Start: 2020-08-13 | End: 2020-11-12 | Stop reason: SDUPTHER

## 2020-08-17 ENCOUNTER — NURSE ONLY (OUTPATIENT)
Dept: FAMILY MEDICINE CLINIC | Age: 85
End: 2020-08-17

## 2020-08-17 VITALS
SYSTOLIC BLOOD PRESSURE: 142 MMHG | TEMPERATURE: 97.7 F | OXYGEN SATURATION: 95 % | HEART RATE: 67 BPM | DIASTOLIC BLOOD PRESSURE: 86 MMHG

## 2020-08-17 NOTE — PROGRESS NOTES
Patient came in today to have blood pressure taken and it was 148/88 and the second time it was 142/86.  Pulse was 67 and oxygen 95

## 2020-10-21 NOTE — TELEPHONE ENCOUNTER
Lakisha Schwartz is calling to request a refill on the following medication(s):    Medication Request:  Requested Prescriptions     Pending Prescriptions Disp Refills    metoprolol tartrate (LOPRESSOR) 25 MG tablet [Pharmacy Med Name: METOPROLOL TARTRATE 25 MG TAB] 180 tablet 0     Sig: take 1 tablet by mouth twice a day       Last Visit Date (If Applicable):  3/09/5443    Next Visit Date:    11/12/2020

## 2020-11-05 RX ORDER — OMEPRAZOLE 20 MG/1
CAPSULE, DELAYED RELEASE ORAL
Qty: 90 CAPSULE | Refills: 0 | Status: SHIPPED | OUTPATIENT
Start: 2020-11-05 | End: 2020-11-12 | Stop reason: SDUPTHER

## 2020-11-05 NOTE — TELEPHONE ENCOUNTER
Doron Schroeder is calling to request a refill on the following medication(s):    Medication Request:  Requested Prescriptions     Pending Prescriptions Disp Refills    omeprazole (PRILOSEC) 20 MG delayed release capsule [Pharmacy Med Name: OMEPRAZOLE DR 20 MG CAPSULE] 90 capsule 0     Sig: take 1 capsule by mouth once daily       Last Visit Date (If Applicable):  8/69/2238 in office      Next Visit Date:    11/12/2020

## 2020-11-12 ENCOUNTER — OFFICE VISIT (OUTPATIENT)
Dept: FAMILY MEDICINE CLINIC | Age: 85
End: 2020-11-12
Payer: COMMERCIAL

## 2020-11-12 VITALS
DIASTOLIC BLOOD PRESSURE: 82 MMHG | WEIGHT: 188 LBS | BODY MASS INDEX: 33.3 KG/M2 | TEMPERATURE: 97.5 F | HEART RATE: 64 BPM | OXYGEN SATURATION: 98 % | SYSTOLIC BLOOD PRESSURE: 162 MMHG

## 2020-11-12 PROCEDURE — 99213 OFFICE O/P EST LOW 20 MIN: CPT | Performed by: NURSE PRACTITIONER

## 2020-11-12 RX ORDER — LOSARTAN POTASSIUM 100 MG/1
100 TABLET ORAL DAILY
Qty: 90 TABLET | Refills: 0 | Status: SHIPPED | OUTPATIENT
Start: 2020-11-12 | End: 2021-02-19 | Stop reason: SDUPTHER

## 2020-11-12 RX ORDER — MELOXICAM 7.5 MG/1
7.5 TABLET ORAL DAILY PRN
Qty: 90 TABLET | Refills: 1 | Status: SHIPPED | OUTPATIENT
Start: 2020-11-12 | End: 2022-06-08 | Stop reason: ALTCHOICE

## 2020-11-12 RX ORDER — OMEPRAZOLE 20 MG/1
20 CAPSULE, DELAYED RELEASE ORAL DAILY
Qty: 90 CAPSULE | Refills: 0 | Status: SHIPPED | OUTPATIENT
Start: 2020-11-12 | End: 2021-02-12

## 2020-11-12 RX ORDER — LOSARTAN POTASSIUM 100 MG/1
50 TABLET ORAL DAILY
Qty: 90 TABLET | Refills: 0 | Status: SHIPPED | OUTPATIENT
Start: 2020-11-12 | End: 2020-11-12 | Stop reason: SDUPTHER

## 2020-11-12 ASSESSMENT — PATIENT HEALTH QUESTIONNAIRE - PHQ9
1. LITTLE INTEREST OR PLEASURE IN DOING THINGS: 0
SUM OF ALL RESPONSES TO PHQ QUESTIONS 1-9: 0
SUM OF ALL RESPONSES TO PHQ9 QUESTIONS 1 & 2: 0
SUM OF ALL RESPONSES TO PHQ QUESTIONS 1-9: 0
2. FEELING DOWN, DEPRESSED OR HOPELESS: 0
SUM OF ALL RESPONSES TO PHQ QUESTIONS 1-9: 0

## 2020-11-12 NOTE — PROGRESS NOTES
Joaquin Landau, MAURA-C  [address]  [id number] [facility] [place]. [facility], [facility]  [phone]  [phone]    Selena Florentino is a 80 y.o. female who is here with c/o of:    Chief Complaint: Hypertension      Patient Accompanied by: n/a    HPI - Selena Florentino is here today for f/u:    HTN   - patient admits that she has been eating a lot of chips and other high sodium foods lately and has not been as physically active as she goes to the [employer], but not now - she is afraid of valeri the COVID virus   - she currently denies h/a, c/p, palpitations, sob, difficulty breathing, lower extremity edema   - current meds: metoprolol 25mg daily, losartan 50mg daily and reports compliance and denies adverse affects of the medications    Back pain   - this is a chronic condition of many years   - she reports generalized aching pain across her entire lower back with concentration on the left   - she denies radiation down either leg   - she has taken meloxicam 7.5mg for this and feels that it helps some as well as Voltaren gel    CREATININE   Date Value Ref Range Status   06/04/2020 0.79 0.50 - 0.90 mg/dL Final     GFR Non-   Date Value Ref Range Status   06/04/2020 >60 >60 mL/min Final     GERD   - she is currently taking omeprazole 20mg daily and this is controlling her symptoms      Patient Active Problem List:     OA (osteoarthritis)     HTN (hypertension)     GERD (gastroesophageal reflux disease)     No past medical history on file. Past Surgical History:   Procedure Laterality Date    MITRAL VALVE SURGERY  3/14/2016    [facility]/Dr. Chapman Sessions     No family history on file. Social History     Tobacco Use    Smoking status: Never Smoker    Smokeless tobacco: Never Used   Substance Use Topics    Alcohol use: No     ALLERGIES:  No Known Allergies       Subjective     · Constitutional:  Negative for activity change, appetite change,unexpected weight change, chills, fever, and fatigue. · HENT: Negative for ear pain, sore throat,  Rhinorrhea, sinus pain, sinus pressure, congestion. · Eyes:  Negative for pain and discharge. · Respiratory:  Negative for chest tightness, shortness of breath, wheezing, and cough. · Cardiovascular:  Negative for chest pain, palpitations and leg swelling. · Gastrointestinal: Negative for abdominal pain, blood in stool, constipation,diarrhea, nausea and vomiting. · Endocrine: Negative for cold intolerance, heat intolerance, polydipsia, polyphagia and polyuria. · Genitourinary: Negative for difficulty urinating, dysuria, flank pain, frequency, hematuria and urgency. · Musculoskeletal: Negative for arthralgias, joint swelling, myalgias, neck pain and neck stiffness. Positive for back pain  · Skin: Negative for rash and wound. · Allergic/Immunologic: Negative for environmental allergies and food allergies. · Neurological:  Negative for dizziness, light-headedness, numbness and headaches. · Hematological:  Negative for adenopathy. Does not bruise/bleed easily. · Psychiatric/Behavioral: Negative for self-injury, sleep disturbance and suicidal ideas. Objective     PHYSICAL EXAM:   · Constitutional: Reyna Mcwilliams is oriented to person, place, and time. Vital signs are normal. Appears well-developed and well-nourished. · HEENT:   · Head: Normocephalic and atraumatic. Right Ear: Hearing and external ear normal.     · Left Ear: Hearing and external ear normal.    · Eyes:PERRL, EOMI, Conjunctiva normal, No discharge. · Neck: Full passive range of motion. Non-tender on palpation. Neck supple. No thyromegaly present. Trachea normal.  · Cardiovascular: Normal rate, regular rhythm, S1, S2, no murmur, no gallop, no friction rub, intact distal pulses. · Pulmonary/Chest: Breath sounds are clear throughout, No respiratory distress, No wheezing, No chest tenderness. Effort normal  · Abdominal: Soft. Normal appearance, bowel sounds are present and normoactive. There is no hepatosplenomegaly. There is no tenderness. There is no CVA tenderness. Musculoskeletal: Extremities appear regular and symmetric. No evident masses, lesions, foreign bodies, or other abnormalities. No edema. No tenderness on palpation. Joints are stable. Full ROM, strength and tone are within normal limits. Physical Exam  Musculoskeletal:      Lumbar back: She exhibits pain. She exhibits normal range of motion, no tenderness, no bony tenderness, no swelling, no edema, no deformity and no spasm. Back:      ·   · Lymphadenopathy: No lymphadenopathy noted. · Neurological: Alert and oriented to person, place, and time. Normal motor function, Normal sensory function, No focal deficits noted. He has normal strength. · Skin: Skin is warm, dry and intact. No obvious lesions on exposed skin  · Psychiatric: Normal mood and affect. Speech is normal and behavior is normal.     Nursing note and vitals reviewed. Blood pressure (!) 162/82, pulse 64, temperature 97.5 °F (36.4 °C), temperature source Temporal, weight 188 lb (85.3 kg), SpO2 98 %. Body mass index is 33.3 kg/m². Wt Readings from Last 3 Encounters:   11/12/20 188 lb (85.3 kg)   08/12/20 189 lb 6.4 oz (85.9 kg)   04/15/19 176 lb 12.8 oz (80.2 kg)     BP Readings from Last 3 Encounters:   11/12/20 (!) 162/82   08/17/20 (!) 142/86   08/12/20 (!) 154/90       No results found for this visit on 11/12/20.     Completed Orders/Prescriptions   Orders Placed This Encounter   Medications    DISCONTD: losartan (COZAAR) 100 MG tablet     Sig: Take 0.5 tablets by mouth daily     Dispense:  90 tablet     Refill:  0    metoprolol tartrate (LOPRESSOR) 25 MG tablet     Sig: Take 1 tablet by mouth 2 times daily     Dispense:  180 tablet     Refill:  0    omeprazole (PRILOSEC) 20 MG delayed release capsule     Sig: Take 1 capsule by mouth Daily     Dispense:  90 capsule     Refill:  0    meloxicam (MOBIC) 7.5 MG tablet     Sig: Take 1 tablet by mouth daily as needed for Pain     Dispense:  90 tablet     Refill:  1    losartan (COZAAR) 100 MG tablet     Sig: Take 1 tablet by mouth daily     Dispense:  90 tablet     Refill:  0    diclofenac sodium (VOLTAREN) 1 % GEL     Sig: Apply topically 2 times daily     Dispense:  100 g     Refill:  3               AssessmentPlan/Medical Decision Making     1. Essential hypertension  - NOT conrolled  - reviewed DASH diet  - f/u with cardiology 11/17 as scheduled  - metoprolol tartrate (LOPRESSOR) 25 MG tablet; Take 1 tablet by mouth 2 times daily  Dispense: 180 tablet; Refill: 0  - INCREASE losartan (COZAAR) 100 MG tablet; Take 1 tablet by mouth daily  Dispense: 90 tablet; Refill: 0    2. Gastroesophageal reflux disease without esophagitis  - controlled  - omeprazole (PRILOSEC) 20 MG delayed release capsule; Take 1 capsule by mouth Daily  Dispense: 90 capsule; Refill: 0    3. Chronic bilateral low back pain without sciatica  - will discuss further options later as patient declines further evaluation to decrease exposure to COVID 19  - meloxicam (MOBIC) 7.5 MG tablet; Take 1 tablet by mouth daily as needed for Pain  Dispense: 90 tablet; Refill: 1  - diclofenac sodium (VOLTAREN) 1 % GEL; Apply topically 2 times daily  Dispense: 100 g; Refill: 3          Return in about 1 week (around 11/19/2020) for HTN. 1.  Isela Laurent received counseling on the following healthy behaviors: nutrition, exercise and medication adherence  2. Patient given educational materials - see patient instructions  3. Was a self-tracking handout given in paper form or via [employer]? No  If yes, see orders or list here. 4.  Discussed use, benefit, and side effects of prescribed medications. Barriers to medication compliance addressed. All patient questions answered. Pt voiced understanding. 5.  Reviewed prior labs, imaging, consultation, follow up, and health maintenance  6. Continue current medications, diet and exercise.   7. Discussed use, benefit, and side effects of prescribed medications. Barriers to medication compliance addressed. All her questions were answered. Pt voiced understanding. Alejandro Farrar will continue current medications, diet and exercise. Patient given educational materials on DASH diet    Of the 15 minute duration appointment visit, Arvind Abbasi CNP spent at least 50% of the face-to-face time in counseling, explanation of diagnosis, planning of further management, and answering all questions. Signed:  Arvind Abbasi CNP    This note is created with the assistance of a speech-recognition program.  While intending to generate a document that actually reflects the content of the visit, no guarantees can be provided that every mistake has been identified and corrected by editing.

## 2021-02-19 ENCOUNTER — OFFICE VISIT (OUTPATIENT)
Dept: FAMILY MEDICINE CLINIC | Age: 86
End: 2021-02-19
Payer: COMMERCIAL

## 2021-02-19 VITALS
BODY MASS INDEX: 33.3 KG/M2 | OXYGEN SATURATION: 98 % | SYSTOLIC BLOOD PRESSURE: 154 MMHG | WEIGHT: 188 LBS | HEART RATE: 74 BPM | DIASTOLIC BLOOD PRESSURE: 68 MMHG | TEMPERATURE: 97.2 F

## 2021-02-19 DIAGNOSIS — E78.5 HYPERLIPIDEMIA, UNSPECIFIED HYPERLIPIDEMIA TYPE: ICD-10-CM

## 2021-02-19 DIAGNOSIS — I48.91 POSTOPERATIVE ATRIAL FIBRILLATION (HCC): ICD-10-CM

## 2021-02-19 DIAGNOSIS — K21.9 GASTROESOPHAGEAL REFLUX DISEASE WITHOUT ESOPHAGITIS: ICD-10-CM

## 2021-02-19 DIAGNOSIS — I10 ESSENTIAL HYPERTENSION: ICD-10-CM

## 2021-02-19 DIAGNOSIS — Z13.220 SCREENING CHOLESTEROL LEVEL: Primary | ICD-10-CM

## 2021-02-19 DIAGNOSIS — I97.89 POSTOPERATIVE ATRIAL FIBRILLATION (HCC): ICD-10-CM

## 2021-02-19 PROCEDURE — 99214 OFFICE O/P EST MOD 30 MIN: CPT | Performed by: NURSE PRACTITIONER

## 2021-02-19 RX ORDER — LOSARTAN POTASSIUM 100 MG/1
100 TABLET ORAL DAILY
Qty: 90 TABLET | Refills: 0 | Status: SHIPPED | OUTPATIENT
Start: 2021-02-19 | End: 2021-08-09 | Stop reason: SDUPTHER

## 2021-02-19 RX ORDER — ATORVASTATIN CALCIUM 20 MG/1
20 TABLET, FILM COATED ORAL NIGHTLY
Qty: 90 TABLET | Refills: 0 | Status: SHIPPED | OUTPATIENT
Start: 2021-02-19 | End: 2021-09-07

## 2021-02-19 RX ORDER — OMEPRAZOLE 20 MG/1
20 CAPSULE, DELAYED RELEASE ORAL DAILY
Qty: 90 CAPSULE | Refills: 0 | Status: SHIPPED | OUTPATIENT
Start: 2021-02-19 | End: 2021-08-04

## 2021-02-19 ASSESSMENT — PATIENT HEALTH QUESTIONNAIRE - PHQ9
SUM OF ALL RESPONSES TO PHQ9 QUESTIONS 1 & 2: 0
SUM OF ALL RESPONSES TO PHQ QUESTIONS 1-9: 0
SUM OF ALL RESPONSES TO PHQ QUESTIONS 1-9: 0
1. LITTLE INTEREST OR PLEASURE IN DOING THINGS: 0

## 2021-02-19 NOTE — PROGRESS NOTES
Kvng Crandall, MAURA-C  [address]  [phone] [facility]. Evelyn Goodpasture [facility], [facility] [address]  [phone]  [phone]    Maribell Herr is a 80 y.o. female who is here with c/o of:    Chief Complaint: Hypertension and Gastroesophageal Reflux      Patient Accompanied by: n/a    HPI - Maribell Herr is here today for f/u:    HTN   - patient admits that she has been eating a lot of chips and other high sodium foods lately and has not been as physically active as she goes to the [facility], but not now - she is afraid of valeri the COVID virus and the other people do not wear masks   - she currently denies h/a, c/p, palpitations, sob, difficulty breathing, lower extremity edema   - current meds: metoprolol 25mg daily, losartan 50mg daily and reports compliance and denies adverse affects of the medications    Back pain   - this is a chronic condition of many years   - she reports generalized aching pain across her entire lower back with concentration on the left   - she denies radiation down either leg   - she has taken meloxicam 7.5mg for this and feels that it helps some as well as Voltaren gel    CREATININE   Date Value Ref Range Status   06/04/2020 0.79 0.50 - 0.90 mg/dL Final     GFR Non-   Date Value Ref Range Status   06/04/2020 >60 >60 mL/min Final     GERD   - she is currently taking omeprazole 20mg daily and this is controlling her symptoms      Patient Active Problem List:     OA (osteoarthritis)     HTN (hypertension)     GERD (gastroesophageal reflux disease)     No past medical history on file. Past Surgical History:   Procedure Laterality Date    MITRAL VALVE SURGERY  3/14/2016    [facility]/Dr. Lubna Tse     No family history on file.   Social History     Tobacco Use    Smoking status: Never Smoker    Smokeless tobacco: Never Used   Substance Use Topics    Alcohol use: No     ALLERGIES:  No Known Allergies       Subjective     · Constitutional:  Negative for activity change, appetite change,unexpected weight change, chills, fever, and fatigue. · HENT: Negative for ear pain, sore throat,  Rhinorrhea, sinus pain, sinus pressure, congestion. · Eyes:  Negative for pain and discharge. · Respiratory:  Negative for chest tightness, shortness of breath, wheezing, and cough. · Cardiovascular:  Negative for chest pain, palpitations and leg swelling. · Gastrointestinal: Negative for abdominal pain, blood in stool, constipation,diarrhea, nausea and vomiting. · Endocrine: Negative for cold intolerance, heat intolerance, polydipsia, polyphagia and polyuria. · Genitourinary: Negative for difficulty urinating, dysuria, flank pain, frequency, hematuria and urgency. · Musculoskeletal: Negative for arthralgias, joint swelling, myalgias, neck pain and neck stiffness. Positive for back pain  · Skin: Negative for rash and wound. · Allergic/Immunologic: Negative for environmental allergies and food allergies. · Neurological:  Negative for dizziness, light-headedness, numbness and headaches. · Hematological:  Negative for adenopathy. Does not bruise/bleed easily. · Psychiatric/Behavioral: Negative for self-injury, sleep disturbance and suicidal ideas. Objective     PHYSICAL EXAM:   · Constitutional: Charan Osuna is oriented to person, place, and time. Vital signs are normal. Appears well-developed and well-nourished. · HEENT:   · Head: Normocephalic and atraumatic. Right Ear: Hearing and external ear normal.   TM and Canal normal  · Left Ear: Hearing and external ear normal.  TM and Canal normal  · Eyes:PERRL, EOMI, Conjunctiva normal, No discharge. · Neck: Full passive range of motion. Non-tender on palpation. Neck supple. No thyromegaly present. Trachea normal.  · Cardiovascular: Normal rate, regular rhythm, S1, S2, no murmur, no gallop, no friction rub, intact distal pulses.   No Carotid Bruit  · Pulmonary/Chest: Breath sounds are clear throughout, No respiratory distress, No wheezing, No chest tenderness. Effort normal  · Abdominal: Soft. Normal appearance, bowel sounds are present and normoactive. There is no hepatosplenomegaly. There is no tenderness. There is no CVA tenderness. Musculoskeletal: Extremities appear regular and symmetric. No evident masses, lesions, foreign bodies, or other abnormalities. No edema. No tenderness on palpation. Joints are stable. Full ROM, strength and tone are within normal limits. Physical Exam  Musculoskeletal:      Lumbar back: She exhibits pain. She exhibits normal range of motion, no tenderness, no bony tenderness, no swelling, no edema, no deformity and no spasm. Back:      ·   · Lymphadenopathy: No lymphadenopathy noted. · Neurological: Alert and oriented to person, place, and time. Normal motor function, Normal sensory function, No focal deficits noted. He has normal strength. · Skin: Skin is warm, dry and intact. No obvious lesions on exposed skin  · Psychiatric: Normal mood and affect. Speech is normal and behavior is normal.     Nursing note and vitals reviewed. Blood pressure (!) 154/68, pulse 74, temperature 97.2 °F (36.2 °C), temperature source Temporal, weight 188 lb (85.3 kg), SpO2 98 %. Body mass index is 33.3 kg/m². Wt Readings from Last 3 Encounters:   02/19/21 188 lb (85.3 kg)   11/12/20 188 lb (85.3 kg)   08/12/20 189 lb 6.4 oz (85.9 kg)     BP Readings from Last 3 Encounters:   02/19/21 (!) 154/68   11/12/20 (!) 162/82   08/17/20 (!) 142/86       No results found for this visit on 02/19/21.     Completed Orders/Prescriptions   Orders Placed This Encounter   Medications    omeprazole (PRILOSEC) 20 MG delayed release capsule     Sig: Take 1 capsule by mouth Daily     Dispense:  90 capsule     Refill:  0    atorvastatin (LIPITOR) 20 MG tablet     Sig: Take 1 tablet by mouth nightly     Dispense:  90 tablet     Refill:  0    metoprolol tartrate (LOPRESSOR) 25 MG tablet     Sig: Take 1 tablet by mouth 2 times daily Barriers to medication compliance addressed. All her questions were answered. Pt voiced understanding. Nancy Hazard will continue current medications, diet and exercise. Patient given educational materials on DASH diet    Of the 30 minute duration appointment visit, Jayson Brooks CNP spent at least 50% of the face-to-face time in counseling, explanation of diagnosis, planning of further management, and answering all questions. Signed:  Jayson Brooks CNP    This note is created with the assistance of a speech-recognition program.  While intending to generate a document that actually reflects the content of the visit, no guarantees can be provided that every mistake has been identified and corrected by editing.

## 2021-05-05 ENCOUNTER — HOSPITAL ENCOUNTER (OUTPATIENT)
Age: 86
Setting detail: SPECIMEN
Discharge: HOME | End: 2021-05-05
Payer: MEDICARE

## 2021-05-05 DIAGNOSIS — Z13.220 SCREENING CHOLESTEROL LEVEL: ICD-10-CM

## 2021-05-05 DIAGNOSIS — I10 ESSENTIAL HYPERTENSION: ICD-10-CM

## 2021-05-05 LAB
ABSOLUTE EOS #: 0.12 K/UL (ref 0–0.44)
ABSOLUTE EOS #: 0.12 K/UL (ref 0–0.44)
ABSOLUTE IMMATURE GRANULOCYTE: <0.03 K/UL (ref 0–0.3)
ABSOLUTE IMMATURE GRANULOCYTE: <0.03 K/UL (ref 0–0.3)
ABSOLUTE LYMPH #: 1.3 K/UL (ref 1.1–3.7)
ABSOLUTE LYMPH #: 1.3 K/UL (ref 1.1–3.7)
ABSOLUTE MONO #: 0.47 K/UL (ref 0.1–1.2)
ABSOLUTE MONO #: 0.47 K/UL (ref 0.1–1.2)
ALBUMIN SERPL-MCNC: 4.3 G/DL (ref 3.5–5.2)
ALBUMIN/GLOBULIN RATIO: 1.4 (ref 1–2.5)
ALP BLD-CCNC: 72 U/L (ref 35–104)
ALT SERPL-CCNC: 22 U/L (ref 5–33)
ANION GAP SERPL CALCULATED.3IONS-SCNC: 9 MMOL/L (ref 9–17)
ANION GAP SERPL CALCULATED.3IONS-SCNC: 9 MMOL/L (ref 9–17)
AST SERPL-CCNC: 34 U/L
BASOPHILS # BLD: 1 % (ref 0–2)
BASOPHILS # BLD: 1 % (ref 0–2)
BASOPHILS ABSOLUTE: 0.03 K/UL (ref 0–0.2)
BASOPHILS ABSOLUTE: 0.03 K/UL (ref 0–0.2)
BILIRUB SERPL-MCNC: 0.36 MG/DL (ref 0.3–1.2)
BNP INTERPRETATION: ABNORMAL
BUN BLDV-MCNC: 17 MG/DL (ref 8–23)
BUN BLDV-MCNC: 17 MG/DL (ref 8–23)
BUN/CREAT BLD: ABNORMAL (ref 9–20)
BUN/CREAT BLD: NORMAL (ref 9–20)
CALCIUM SERPL-MCNC: 9.3 MG/DL (ref 8.6–10.4)
CALCIUM SERPL-MCNC: 9.3 MG/DL (ref 8.6–10.4)
CHLORIDE BLD-SCNC: 100 MMOL/L (ref 98–107)
CHLORIDE BLD-SCNC: 100 MMOL/L (ref 98–107)
CHOLESTEROL, FASTING: 126 MG/DL
CHOLESTEROL/HDL RATIO: 2.7
CO2: 27 MMOL/L (ref 20–31)
CO2: 27 MMOL/L (ref 20–31)
CREAT SERPL-MCNC: 0.88 MG/DL (ref 0.5–0.9)
CREAT SERPL-MCNC: 0.88 MG/DL (ref 0.5–0.9)
DIFFERENTIAL TYPE: NORMAL
DIFFERENTIAL TYPE: NORMAL
EOSINOPHILS RELATIVE PERCENT: 2 % (ref 1–4)
EOSINOPHILS RELATIVE PERCENT: 2 % (ref 1–4)
GFR AFRICAN AMERICAN: >60 ML/MIN
GFR AFRICAN AMERICAN: >60 ML/MIN
GFR NON-AFRICAN AMERICAN: >60 ML/MIN
GFR NON-AFRICAN AMERICAN: >60 ML/MIN
GFR SERPL CREATININE-BSD FRML MDRD: ABNORMAL ML/MIN/{1.73_M2}
GFR SERPL CREATININE-BSD FRML MDRD: ABNORMAL ML/MIN/{1.73_M2}
GFR SERPL CREATININE-BSD FRML MDRD: NORMAL ML/MIN/{1.73_M2}
GFR SERPL CREATININE-BSD FRML MDRD: NORMAL ML/MIN/{1.73_M2}
GLUCOSE BLD-MCNC: 91 MG/DL (ref 70–99)
GLUCOSE BLD-MCNC: 91 MG/DL (ref 70–99)
HCT VFR BLD CALC: 38.1 % (ref 36.3–47.1)
HCT VFR BLD CALC: 38.1 % (ref 36.3–47.1)
HDLC SERPL-MCNC: 46 MG/DL
HEMOGLOBIN: 12 G/DL (ref 11.9–15.1)
HEMOGLOBIN: 12 G/DL (ref 11.9–15.1)
IMMATURE GRANULOCYTES: 0 %
IMMATURE GRANULOCYTES: 0 %
LDL CHOLESTEROL: 65 MG/DL (ref 0–130)
LYMPHOCYTES # BLD: 26 % (ref 24–43)
LYMPHOCYTES # BLD: 26 % (ref 24–43)
MCH RBC QN AUTO: 27.5 PG (ref 25.2–33.5)
MCH RBC QN AUTO: 27.5 PG (ref 25.2–33.5)
MCHC RBC AUTO-ENTMCNC: 31.5 G/DL (ref 28.4–34.8)
MCHC RBC AUTO-ENTMCNC: 31.5 G/DL (ref 28.4–34.8)
MCV RBC AUTO: 87.2 FL (ref 82.6–102.9)
MCV RBC AUTO: 87.2 FL (ref 82.6–102.9)
MONOCYTES # BLD: 9 % (ref 3–12)
MONOCYTES # BLD: 9 % (ref 3–12)
NRBC AUTOMATED: 0 PER 100 WBC
NRBC AUTOMATED: 0 PER 100 WBC
PDW BLD-RTO: 14.2 % (ref 11.8–14.4)
PDW BLD-RTO: 14.2 % (ref 11.8–14.4)
PLATELET # BLD: 203 K/UL (ref 138–453)
PLATELET # BLD: 203 K/UL (ref 138–453)
PLATELET ESTIMATE: NORMAL
PLATELET ESTIMATE: NORMAL
PMV BLD AUTO: 10.8 FL (ref 8.1–13.5)
PMV BLD AUTO: 10.8 FL (ref 8.1–13.5)
POTASSIUM SERPL-SCNC: 5.2 MMOL/L (ref 3.7–5.3)
POTASSIUM SERPL-SCNC: 5.2 MMOL/L (ref 3.7–5.3)
PRO-BNP: 385 PG/ML
RBC # BLD: 4.37 M/UL (ref 3.95–5.11)
RBC # BLD: 4.37 M/UL (ref 3.95–5.11)
RBC # BLD: NORMAL 10*6/UL
RBC # BLD: NORMAL 10*6/UL
SEG NEUTROPHILS: 62 % (ref 36–65)
SEG NEUTROPHILS: 62 % (ref 36–65)
SEGMENTED NEUTROPHILS ABSOLUTE COUNT: 3.15 K/UL (ref 1.5–8.1)
SEGMENTED NEUTROPHILS ABSOLUTE COUNT: 3.15 K/UL (ref 1.5–8.1)
SODIUM BLD-SCNC: 136 MMOL/L (ref 135–144)
SODIUM BLD-SCNC: 136 MMOL/L (ref 135–144)
TOTAL PROTEIN: 7.3 G/DL (ref 6.4–8.3)
TRIGLYCERIDE, FASTING: 75 MG/DL
VLDLC SERPL CALC-MCNC: NORMAL MG/DL (ref 1–30)
WBC # BLD: 5.1 K/UL (ref 3.5–11.3)
WBC # BLD: 5.1 K/UL (ref 3.5–11.3)
WBC # BLD: NORMAL 10*3/UL
WBC # BLD: NORMAL 10*3/UL

## 2021-06-01 ENCOUNTER — OFFICE VISIT (OUTPATIENT)
Dept: FAMILY MEDICINE CLINIC | Age: 86
End: 2021-06-01
Payer: MEDICARE

## 2021-06-01 VITALS
SYSTOLIC BLOOD PRESSURE: 130 MMHG | OXYGEN SATURATION: 97 % | BODY MASS INDEX: 33.44 KG/M2 | HEART RATE: 68 BPM | WEIGHT: 188.8 LBS | DIASTOLIC BLOOD PRESSURE: 70 MMHG

## 2021-06-01 DIAGNOSIS — K21.9 GASTROESOPHAGEAL REFLUX DISEASE WITHOUT ESOPHAGITIS: ICD-10-CM

## 2021-06-01 DIAGNOSIS — Z71.89 ACP (ADVANCE CARE PLANNING): ICD-10-CM

## 2021-06-01 DIAGNOSIS — M54.50 CHRONIC BILATERAL LOW BACK PAIN WITHOUT SCIATICA: ICD-10-CM

## 2021-06-01 DIAGNOSIS — G89.29 CHRONIC BILATERAL LOW BACK PAIN WITHOUT SCIATICA: ICD-10-CM

## 2021-06-01 DIAGNOSIS — E78.5 HYPERLIPIDEMIA, UNSPECIFIED HYPERLIPIDEMIA TYPE: ICD-10-CM

## 2021-06-01 DIAGNOSIS — I10 ESSENTIAL HYPERTENSION: ICD-10-CM

## 2021-06-01 DIAGNOSIS — Z00.00 ROUTINE GENERAL MEDICAL EXAMINATION AT A HEALTH CARE FACILITY: Primary | ICD-10-CM

## 2021-06-01 PROCEDURE — 99214 OFFICE O/P EST MOD 30 MIN: CPT | Performed by: NURSE PRACTITIONER

## 2021-06-01 PROCEDURE — 99497 ADVNCD CARE PLAN 30 MIN: CPT | Performed by: NURSE PRACTITIONER

## 2021-06-01 PROCEDURE — G0439 PPPS, SUBSEQ VISIT: HCPCS | Performed by: NURSE PRACTITIONER

## 2021-06-01 RX ORDER — FUROSEMIDE 20 MG/1
20 TABLET ORAL DAILY
COMMUNITY
Start: 2021-05-18

## 2021-06-01 ASSESSMENT — PATIENT HEALTH QUESTIONNAIRE - PHQ9
SUM OF ALL RESPONSES TO PHQ QUESTIONS 1-9: 0
SUM OF ALL RESPONSES TO PHQ QUESTIONS 1-9: 0
1. LITTLE INTEREST OR PLEASURE IN DOING THINGS: 0
2. FEELING DOWN, DEPRESSED OR HOPELESS: 0
SUM OF ALL RESPONSES TO PHQ9 QUESTIONS 1 & 2: 0
SUM OF ALL RESPONSES TO PHQ QUESTIONS 1-9: 0

## 2021-06-01 ASSESSMENT — LIFESTYLE VARIABLES: HOW OFTEN DO YOU HAVE A DRINK CONTAINING ALCOHOL: 0

## 2021-06-01 NOTE — PATIENT INSTRUCTIONS
Advance Directives: Care Instructions  Overview  An advance directive is a legal way to state your wishes at the end of your life. It tells your family and your doctor what to do if you can't say what you want. There are two main types of advance directives. You can change them any time your wishes change. Living will. This form tells your family and your doctor your wishes about life support and other treatment. The form is also called a declaration. Medical power of . This form lets you name a person to make treatment decisions for you when you can't speak for yourself. This person is called a health care agent (health care proxy, health care surrogate). The form is also called a durable power of  for health care. If you do not have an advance directive, decisions about your medical care may be made by a family member, or by a doctor or a  who doesn't know you. It may help to think of an advance directive as a gift to the people who care for you. If you have one, they won't have to make tough decisions by themselves. Follow-up care is a key part of your treatment and safety. Be sure to make and go to all appointments, and call your doctor if you are having problems. It's also a good idea to know your test results and keep a list of the medicines you take. What should you include in an advance directive? Many states have a unique advance directive form. (It may ask you to address specific issues.) Or you might use a universal form that's approved by many states. If your form doesn't tell you what to address, it may be hard to know what to include in your advance directive. Use the questions below to help you get started. · Who do you want to make decisions about your medical care if you are not able to? · What life-support measures do you want if you have a serious illness that gets worse over time or can't be cured? · What are you most afraid of that might happen? (Maybe you're afraid of having pain, losing your independence, or being kept alive by machines.)  · Where would you prefer to die? (Your home? A hospital? A nursing home?)  · Do you want to donate your organs when you die? · Do you want certain Mu-ism practices performed before you die? When should you call for help? Be sure to contact your doctor if you have any questions. Where can you learn more? Go to https://chpepiceweb.[email or web address] org and sign in to your [employer] account. Enter R264 in the [employer] box to learn more about \"Advance Directives: Care Instructions. \"     If you do not have an account, please click on the \"Sign Up Now\" link. Current as of: July 17, 2020               Content Version: 12.8  © 2006-2021 Healthwise, [employer]. Care instructions adapted under license by [facility] [place] ([facility]). If you have questions about a medical condition or this instruction, always ask your healthcare professional. [place] [address] any warranty or liability for your use of this information. Learning About Medical Power of   What is a medical power of ? A medical power of , also called a durable power of  for health care, is one type of the legal forms called advance directives. It lets you name the person you want to make treatment decisions for you if you can't speak or decide for yourself. The person you choose is called your health care agent. This person is also called a health care proxy or health care surrogate. A medical power of  may be called something else in your state. How do you choose a health care agent? Choose your health care agent carefully. This person may or may not be a family member. Talk to the person before you make your final decision. Make sure he or she is comfortable with this responsibility. It's a good idea to choose someone who:  · Is at least 25years old.   · Knows you well and understands what makes life meaningful for you. · Understands your [place] and moral values. · Will do what you want, not what he or she wants. · Will be able to make difficult choices at a stressful time. · Will be able to refuse or stop treatment, if that is what you would want, even if you could die. · Will be firm and confident with health professionals if needed. · Will ask questions to get needed information. · Lives near you or agrees to travel to you if needed. Your family may help you make medical decisions while you can still be part of that process. But it's important to choose one person to be your health care agent in case you aren't able to make decisions for yourself. If you don't fill out the legal form and name a health care agent, the decisions your family can make may be limited. A health care agent may be called something else in your state. Who will make decisions for you if you don't have a health care agent? If you don't have a health care agent or a living will, you may not get the care you want. Decisions may be made by family members who disagree about your medical care. Or decisions may be made by a medical professional who doesn't know you well. In some cases, a  makes the decisions. When you name a health care agent, it is very clear who has the power to make health decisions for you. How do you name a health care agent? You name your health care agent on a legal form. This form is usually called a medical power of . Ask your hospital, state bar association, or office on aging where to find these forms. You must sign the form to make it legal. Some states require you to get the form notarized. This means that a person called a  watches you sign the form and then he or she signs the form. Some states also require that two or more witnesses sign the form. Be sure to tell your family members and doctors who your health care agent is.   Where can you learn more? Go to https://chpepiceweb.Engiver. org and sign in to your [employer] account. Enter [id number] in the [facility] box to learn more about \"Learning About [address]. \"     If you do not have an account, please click on the \"Sign Up Now\" link. Current as of: July 17, 2020               Content Version: 12.8  © 2006-2021 [employer]. Care instructions adapted under license by [facility] ([facility]). If you have questions about a medical condition or this instruction, always ask your healthcare professional. [address] any warranty or liability for your use of this information. Learning About [place]  What is a living will? A living will, also called a declaration, is a legal form. It tells your family and your doctor your wishes when you can't speak for yourself. It's used by the health professionals who will treat you as you near the end of your life or if you get seriously hurt or ill. If you put your wishes in writing, your loved ones and others will know what kind of care you want. They won't need to guess. This can ease your mind and be helpful to others. And you can change or cancel your living will at any time. A living will is not the same as an estate or property will. An estate will explains what you want to happen with your money and property after you die. How do you use it? A living will is used to describe the kinds of treatment or life support you want as you near the end of your life or if you get seriously hurt or ill. Keep these facts in mind about living nevarez. · Your living will is used only if you can't speak or make decisions for yourself. Most often, one or more doctors must certify that you can't speak or decide for yourself before your living will takes effect. · If you get better and can speak for yourself again, you can accept or refuse any treatment.  It doesn't matter what you said in your living will. · Some states may limit your right to refuse treatment in certain cases. For example, you may need to clearly state in your living will that you don't want artificial hydration and nutrition, such as being fed through a tube. Is a living will a legal document? A living will is a legal document. Each state has its own laws about living nevarez. And a living will may be called something else in your state. Here are some things to know about living nevarez. · You don't need an  to complete a living will. But legal advice can be helpful if your state's laws are unclear. It can also help if your health history is complicated or your family can't agree on what should be in your living will. · You can change your living will at any time. Some people find that their wishes about end-of-life care change as their health changes. If you make big changes to your living will, complete a new form. · If you move to another state, make sure that your living will is legal in the state where you now live. In most cases, doctors will respect your wishes even if you have a form from a different state. · You might use a universal form that has been approved by many states. This kind of form can sometimes be filled out and stored online. Your digital copy will then be available wherever you have a connection to the internet. The doctors and nurses who need to treat you can find it right away. · Your state may offer an online registry. This is another place where you can store your living will online. · It's a good idea to get your living will notarized. This means using a person called a  to watch two people sign, or witness, your living will. What should you know when you create a living will? Here are some questions to ask yourself as you make your living will:  · Do you know enough about life support methods that might be used?  If not, talk to your doctor so you know what might be done if you can't breathe on your own, your heart stops, or you can't swallow. · What things would you still want to be able to do after you receive life-support methods? Would you want to be able to walk? To speak? To eat on your own? To live without the help of machines? · Do you want certain Shinto practices performed if you become very ill? · If you have a choice, where do you want to be cared for? In your home? At a hospital or nursing home? · If you have a choice at the end of your life, where would you prefer to die? At home? In a hospital or nursing home? Somewhere else? · Would you prefer to be buried or cremated? · Do you want your organs to be donated after you die? What should you do with your living will? · Make sure that your family members and your health care agent have copies of your living will (also called a declaration). · Give your doctor a copy of your living will. Ask him or her to keep it as part of your medical record. If you have more than one doctor, make sure that each one has a copy. · Put a copy of your living will where it can be easily found. For example, some people may put a copy on their refrigerator door. If you are using a digital copy, be sure your doctor, family members, and health care agent know how to find and access it. Where can you learn more? Go to https://GoMetropepiceweb.GloPos Technology. org and sign in to your [employer] account. Enter K466 in the [facility] box to learn more about \"Learning About Living [facility]. \"     If you do not have an account, please click on the \"Sign Up Now\" link. Current as of: July 17, 2020               Content Version: 12.8  © 0588-7039 Healthwise, Incorporated. Care instructions adapted under license by [facility] ([facility]).  If you have questions about a medical condition or this instruction, always ask your healthcare professional. [address] any warranty or liability for your use of this

## 2021-06-01 NOTE — PROGRESS NOTES
Julio Tracy, FNP-C  [address]  [id number] [facility]. [facility] [place] [address]  [phone]  [phone]    Ivania Cho is a 80 y.o. female who is here with c/o of:    Chief Complaint: Medicare AWV (medication review)      Patient Accompanied by: n/a    HPI - Ivania Cho is here today for f/u:    HTN   - patient admits that she has been eating a lot of chips and other high sodium foods at her last visit and has cut back since last visit and has not been as physically active as she goes to the [facility] and with mask restrictions not being followed has not gone. Now that the mask requirements are being lifted, she states she will return to the  3 days per week   - she currently denies h/a, c/p, palpitations, sob, difficulty breathing, lower extremity edema   - current meds: metoprolol 25mg daily, losartan 50mg daily and reports compliance and denies adverse affects of the medications    Back pain   - this is a chronic condition of many years   - she reports generalized aching pain across her entire lower back with concentration on the left   - she denies radiation down either leg   - she has taken meloxicam 7.5mg for this and feels that it helps some as well as Voltaren gel, but not taking the meloxicam regularly d/t her b/p being high at last visit      CREATININE   Date Value Ref Range Status   05/05/2021 0.88 0.50 - 0.90 mg/dL Final   05/05/2021 0.88 0.50 - 0.90 mg/dL Final     GFR Non-   Date Value Ref Range Status   05/05/2021 >60 >60 mL/min Final   05/05/2021 >60 >60 mL/min Final     GERD   - she is currently taking omeprazole 20mg daily and this is controlling her symptoms      Patient Active Problem List:     OA (osteoarthritis)     HTN (hypertension)     GERD (gastroesophageal reflux disease)     No past medical history on file.    Past Surgical History:   Procedure Laterality Date    MITRAL VALVE SURGERY  3/14/2016    [facility]/Dr. Sara Alas     No family history on file.  Social History     Tobacco Use    Smoking status: Never Smoker    Smokeless tobacco: Never Used   Substance Use Topics    Alcohol use: No     ALLERGIES:  No Known Allergies       Subjective     · Constitutional:  Negative for activity change, appetite change,unexpected weight change, chills, fever, and fatigue. · HENT: Negative for ear pain, sore throat,  Rhinorrhea, sinus pain, sinus pressure, congestion. · Eyes:  Negative for pain and discharge. · Respiratory:  Negative for chest tightness, shortness of breath, wheezing, and cough. · Cardiovascular:  Negative for chest pain, palpitations and leg swelling. · Gastrointestinal: Negative for abdominal pain, blood in stool, constipation,diarrhea, nausea and vomiting. · Endocrine: Negative for cold intolerance, heat intolerance, polydipsia, polyphagia and polyuria. · Genitourinary: Negative for difficulty urinating, dysuria, flank pain, frequency, hematuria and urgency. · Musculoskeletal: Negative for arthralgias, joint swelling, myalgias, neck pain and neck stiffness. Positive for back pain  · Skin: Negative for rash and wound. · Allergic/Immunologic: Negative for environmental allergies and food allergies. · Neurological:  Negative for dizziness, light-headedness, numbness and headaches. · Hematological:  Negative for adenopathy. Does not bruise/bleed easily. · Psychiatric/Behavioral: Negative for self-injury, sleep disturbance and suicidal ideas. Objective     · Constitutional: Emmanuelle Reyna is oriented to person, place, and time. Vital signs are normal. Appears well-developed and well-nourished. · HEENT:   · Head: Normocephalic and atraumatic. Right Ear: Hearing and external ear normal. TM and Canal normal  Left Ear: Hearing and external ear normal. TM and Canal normal  · Nose:  Nares normal. Septum midline. Mucosa normal. No drainage or sinus tenderness. · Mouth/Throat: Oropharynx-no erythema, no exudate.   Uvula midline, no erythema, no edema. Mucous membranes are pink and moist.   · Eyes:PERRL, EOMI, Conjunctiva normal, No discharge. · Neck: Trachea normal, full passive range of motion. Non-tender on palpation. Neck supple. No thyromegaly present. · Cardiovascular: Normal rate, regular rhythm, S1, S2, no murmur, no gallop, no friction rub, intact distal pulses. · Pulmonary/Chest: Breath sounds are clear throughout, No respiratory distress, No wheezing, No chest tenderness. Effort normal  · Abdominal: Soft. Normal appearance, bowel sounds are present and normoactive. There is no hepatosplenomegaly. There is no tenderness. There is no CVA tenderness. · Musculoskeletal: Extremities appear regular and symmetric. No evident masses, lesions, foreign bodies, or other abnormalities. No edema. No tenderness on palpation. Joints are stable. Full ROM, strength and tone are within normal limits. Back: non-tender, no obvious deformity, no swelling, no crepitus, no bony tenderness  · Lymphadenopathy: No lymphadenopathy noted. · Neurological: Alert and oriented to person, place, and time. Normal motor function, Normal sensory function, No focal deficits noted. He has normal strength. · Skin: Skin is warm, dry and intact. No obvious lesions on exposed skin  · Psychiatric: Normal mood and affect. Speech is normal and behavior is normal.       Nursing note and vitals reviewed. Blood pressure 130/70, pulse 68, weight 188 lb 12.8 oz (85.6 kg), SpO2 97 %. Body mass index is 33.44 kg/m². Wt Readings from Last 3 Encounters:   06/01/21 188 lb 12.8 oz (85.6 kg)   02/19/21 188 lb (85.3 kg)   11/12/20 188 lb (85.3 kg)     BP Readings from Last 3 Encounters:   06/01/21 130/70   02/19/21 (!) 154/68   11/12/20 (!) 162/82       No results found for this visit on 06/01/21. No results found for this visit on 06/01/21.     Completed Orders/Prescriptions   No orders of the defined types were placed in this encounter. AssessmentPlan/Medical Decision Making     1. Routine general medical examination at a health care facility    2. ACP (advance care planning)  - [place] ADVANCED CARE PLAN FACE TO [address], [address] [[id number]]    3. Essential hypertension  - controlled  - f/u and tx plan per cardiology  - continue metorpolol tartrate 25mg bid  - continue losartan 100mg daily  - continue lasix 20mg daily (per cardiology orders)  - PA OFFICE/OUTPATIENT ESTABLISHED MOD MDM 30-39 MIN    4. Gastroesophageal reflux disease without esophagitis  - controlled  - continue omeprazole 20mg daily  - PA OFFICE/OUTPATIENT ESTABLISHED MOD MDM 30-39 MIN    5. Hyperlipidemia, unspecified hyperlipidemia type  - continue atorvastatin 20mg nightly  - PA OFFICE/OUTPATIENT ESTABLISHED MOD MDM 30-39 MIN    6. Chronic bilateral low back pain without sciatica  - meloxicam 7.5mg as needed and voltaren gel as needed  - PA OFFICE/OUTPATIENT ESTABLISHED MOD MDM 30-39 MIN        Return for Medicare Annual Wellness Visit in 1 year; 6 months for chronic conditions. 1.  Anant Lennon received counseling on the following healthy behaviors: nutrition, exercise and medication adherence  2. Patient given educational materials - see patient instructions  3. Was a self-tracking handout given in paper form or via [employer]? No  If yes, see orders or list here. 4.  Discussed use, benefit, and side effects of prescribed medications. Barriers to medication compliance addressed. All patient questions answered. Pt voiced understanding. 5.  Reviewed prior labs, imaging, consultation, follow up, and health maintenance  6. Continue current medications, diet and exercise. 7. Discussed use, benefit, and side effects of prescribed medications. Barriers to medication compliance addressed. All her questions were answered. Pt voiced understanding. Anant Lennon will continue current medications, diet and exercise.     Patient given educational materials on DASH diet    Of the 30 minute duration appointment visit, Henrietat Palafox CNP spent at least 50% of the face-to-face time in counseling, explanation of diagnosis, planning of further management, and answering all questions. Signed:  Henrietta Palafox CNP    This note is created with the assistance of a speech-recognition program.  While intending to generate a document that actually reflects the content of the visit, no guarantees can be provided that every mistake has been identified and corrected by editing. Medicare Annual Wellness Visit  Name: Marizol Burden Date: 2021   MRN: [id number] Sex: Female   Age: 80 y.o. Ethnicity: Non-/Non    : 1934 Race: Jaren Ghotra is here for Medicare AWV (medication review)    Screenings for behavioral, psychosocial and functional/safety risks, and cognitive dysfunction are all negative except as indicated below. These results, as well as other patient data from the [address] form, are documented in Flowsheets linked to this Encounter. No Known Allergies      Prior to Visit Medications    Medication Sig Taking?  Authorizing Provider   furosemide (LASIX) 20 MG tablet Take 20 mg by mouth daily Yes Historical Provider, MD   metoprolol tartrate (LOPRESSOR) 25 MG tablet take 1 tablet by mouth twice a day  WEI Rutherford CNP   omeprazole (PRILOSEC) 20 MG delayed release capsule Take 1 capsule by mouth Daily  WEI Harris CNP   atorvastatin (LIPITOR) 20 MG tablet Take 1 tablet by mouth nightly  [employer], APRN - CNP   losartan (COZAAR) 100 MG tablet Take 1 tablet by mouth daily  WEI Rutherford CNP   meloxicam (MOBIC) 7.5 MG tablet Take 1 tablet by mouth daily as needed for Pain  [employer], APRN - CNP   diclofenac sodium (VOLTAREN) 1 % GEL Apply topically 2 times daily  [employer], APRN - CNP   aspirin 81 MG EC tablet Take 81 mg by mouth daily  Historical Provider, MD   MULTIPLE VITAMIN PO Take  by mouth daily.  Historical Provider, MD   calcium carbonate (OSCAL) 500 MG TABS tablet Take 500 mg by mouth daily. Historical Provider, MD       No past medical history on file. Past Surgical History:   Procedure Laterality Date    MITRAL VALVE SURGERY  3/14/2016    [facility]/Dr. Mookie Mccallum       No family history on file. CareTeam (Including outside providers/suppliers regularly involved in providing care):   Patient Care Team:  WEI Nuñez CNP as PCP - General (Nurse Practitioner)  WEI Nuñez CNP as PCP - [facility] Empaneled Provider    Wt Readings from Last 3 Encounters:   06/01/21 188 lb 12.8 oz (85.6 kg)   02/19/21 188 lb (85.3 kg)   11/12/20 188 lb (85.3 kg)     Vitals:    06/01/21 1307   BP: 130/70   Site: Right Upper Arm   Position: Sitting   Cuff Size: Medium Adult   Pulse: 68   SpO2: 97%   Weight: 188 lb 12.8 oz (85.6 kg)     Body mass index is 33.44 kg/m². Based upon direct observation of the patient, evaluation of cognition reveals recent and remote memory intact. Patient's complete Health Risk Assessment and screening values have been reviewed and are found in Flowsheets. The following problems were reviewed today and where indicated follow up appointments were made and/or referrals ordered. Positive Risk Factor Screenings with Interventions:            General Health and ACP:  General  In general, how would you say your health is?: Good  In the past 7 days, have you experienced any of the following?  New or Increased Pain, New or Increased Fatigue, Loneliness, Social Isolation, Stress or Anger?: None of These  Do you get the social and emotional support that you need?: Yes  Do you have a Living Will?: (!) No  Advance Directives     Power of [address] Will ACP-Advance Directive ACP-Power of     Not on File Not on File Not on File Not on File      General Health Risk Interventions:  · No Living Will: Advance Care Planning addressed with patient today    Health Habits/Nutrition:  Health Habits/Nutrition  Do you exercise for at least 20 minutes 2-3 times per week?: Yes  Have you lost any weight without trying in the past 3 months?: No  Do you eat only one meal per day?: No  Have you seen the dentist within the past year?: Yes     Health Habits/Nutrition Interventions:  · Inadequate physical activity:  educational materials provided to promote increased physical activity       Personalized Preventive Plan   Current Health Maintenance Status  Immunization History   Administered Date(s) Administered    Influenza, High Dose (Fluzone 65 yrs and older) 10/05/2020    Pneumococcal Conjugate 13-valent ([id number]) 01/28/2016    Pneumococcal Polysaccharide ([id number]) 06/26/2014        Health Maintenance   Topic Date Due    DTaP/Tdap/Td vaccine (1 - Tdap) Never done   [employer] Visit (AWV)  Never done    Lipid screen  05/05/2022    Potassium monitoring  05/05/2022    Creatinine monitoring  05/05/2022    Flu vaccine  Completed    Shingles Vaccine  Completed    Pneumococcal 65+ years Vaccine  Completed    COVID-19 Vaccine  Completed    Hepatitis A vaccine  Aged Out    Hepatitis B vaccine  Aged Out    Hib vaccine  Aged Out    Meningococcal (ACWY) vaccine  Aged Out     Recommendations for [employer] Due: see orders and patient instructions/AVS.  . Recommended screening schedule for the next 5-10 years is provided to the patient in written form: see Patient Instructions/AVS.    Lana Martinez was seen today for medicare awv.     Diagnoses and all orders for this visit:    Routine general medical examination at a health care facility    ACP (advance care planning)  -     [place] ADVANCED CARE PLAN FACE TO FACE, 1ST 30MIN [id number]    Essential hypertension  -     SD OFFICE/OUTPATIENT ESTABLISHED MOD MDM 30-39 MIN    Gastroesophageal reflux disease without esophagitis  -     SD OFFICE/OUTPATIENT ESTABLISHED MOD MDM 30-39 MIN    Hyperlipidemia, unspecified hyperlipidemia type  -     AL OFFICE/OUTPATIENT ESTABLISHED MOD [facility] 30-39 MIN    Chronic bilateral low back pain without sciatica  -     AL OFFICE/OUTPATIENT ESTABLISHED MOD [facility] 30-39 MIN           Advance Care Planning   Advanced Care Planning: Discussed the patients choices for care and treatment in case of a health event that adversely affects decision-making abilities. Also discussed the patients long-term treatment options. Reviewed with the patient the [address] [place] [address] of [address] [place] [address] Declaration forms  Reviewed the process of designating a competent adult as an Agent (or -in-fact) that could take make health care decisions for the patient if incompetent. Patient was asked to complete the declaration forms, either acknowledge the forms by a public notary or an eligible witness and provide a signed copy to the practice office.   Time spent (minutes): 5

## 2021-08-04 DIAGNOSIS — I10 ESSENTIAL HYPERTENSION: ICD-10-CM

## 2021-08-04 DIAGNOSIS — K21.9 GASTROESOPHAGEAL REFLUX DISEASE WITHOUT ESOPHAGITIS: ICD-10-CM

## 2021-08-04 NOTE — TELEPHONE ENCOUNTER
Bruce Fierro is calling to request a refill on the following medication(s):    Medication Request:  Requested Prescriptions     Pending Prescriptions Disp Refills    losartan (COZAAR) 25 MG tablet [Pharmacy Med Name: LOSARTAN POTASSIUM 25 MG TAB] 90 tablet 1     Sig: take 1 tablet by mouth once daily    omeprazole (PRILOSEC) 20 MG delayed release capsule [Pharmacy Med Name: OMEPRAZOLE DR 20 MG CAPSULE] 90 capsule 1     Sig: take 1 capsule by mouth once daily       Last Visit Date (If Applicable):  5/4/2518    Next Visit Date:    12/1/2021

## 2021-08-09 RX ORDER — LOSARTAN POTASSIUM 25 MG/1
TABLET ORAL
Qty: 90 TABLET | Refills: 1 | Status: SHIPPED | OUTPATIENT
Start: 2021-08-09 | End: 2022-09-27

## 2021-08-09 RX ORDER — OMEPRAZOLE 20 MG/1
CAPSULE, DELAYED RELEASE ORAL
Qty: 90 CAPSULE | Refills: 1 | Status: SHIPPED | OUTPATIENT
Start: 2021-08-09 | End: 2021-11-10

## 2021-09-08 RX ORDER — FUROSEMIDE 20 MG/1
20 TABLET ORAL DAILY
Qty: 90 TABLET | Refills: 1 | OUTPATIENT
Start: 2021-09-08

## 2021-09-08 NOTE — TELEPHONE ENCOUNTER
Tish Atkins is calling to request a refill on the following medication(s):    Medication Request:  Requested Prescriptions     Pending Prescriptions Disp Refills    furosemide (LASIX) 20 MG tablet 90 tablet 1     Sig: Take 1 tablet by mouth daily       Last Visit Date (If Applicable):  1/0/0612    Next Visit Date:    12/1/2021

## 2021-11-10 DIAGNOSIS — K21.9 GASTROESOPHAGEAL REFLUX DISEASE WITHOUT ESOPHAGITIS: ICD-10-CM

## 2021-11-10 RX ORDER — OMEPRAZOLE 20 MG/1
CAPSULE, DELAYED RELEASE ORAL
Qty: 90 CAPSULE | Refills: 1 | Status: SHIPPED | OUTPATIENT
Start: 2021-11-10 | End: 2022-04-14

## 2021-11-10 NOTE — TELEPHONE ENCOUNTER
Claudell Kand is calling to request a refill on the following medication(s):    Medication Request:  Requested Prescriptions     Pending Prescriptions Disp Refills    omeprazole (PRILOSEC) 20 MG delayed release capsule [Pharmacy Med Name: OMEPRAZOLE DR 20 MG CAPSULE] 90 capsule 1     Sig: take 1 capsule by mouth once daily       Last Visit Date (If Applicable):  1/3/8083 In office    Next Visit Date:    12/1/2021

## 2021-12-01 ENCOUNTER — OFFICE VISIT (OUTPATIENT)
Dept: FAMILY MEDICINE CLINIC | Age: 86
End: 2021-12-01
Payer: MEDICARE

## 2021-12-01 VITALS
TEMPERATURE: 97 F | HEART RATE: 66 BPM | OXYGEN SATURATION: 97 % | DIASTOLIC BLOOD PRESSURE: 84 MMHG | WEIGHT: 187 LBS | SYSTOLIC BLOOD PRESSURE: 137 MMHG | BODY MASS INDEX: 33.13 KG/M2

## 2021-12-01 DIAGNOSIS — E78.5 HYPERLIPIDEMIA, UNSPECIFIED HYPERLIPIDEMIA TYPE: ICD-10-CM

## 2021-12-01 DIAGNOSIS — K21.9 GASTROESOPHAGEAL REFLUX DISEASE WITHOUT ESOPHAGITIS: ICD-10-CM

## 2021-12-01 DIAGNOSIS — G89.29 CHRONIC BILATERAL LOW BACK PAIN WITHOUT SCIATICA: ICD-10-CM

## 2021-12-01 DIAGNOSIS — M54.50 CHRONIC BILATERAL LOW BACK PAIN WITHOUT SCIATICA: ICD-10-CM

## 2021-12-01 DIAGNOSIS — I10 ESSENTIAL HYPERTENSION: Primary | ICD-10-CM

## 2021-12-01 PROCEDURE — 99213 OFFICE O/P EST LOW 20 MIN: CPT | Performed by: NURSE PRACTITIONER

## 2021-12-01 SDOH — ECONOMIC STABILITY: FOOD INSECURITY: WITHIN THE PAST 12 MONTHS, THE FOOD YOU BOUGHT JUST DIDN'T LAST AND YOU DIDN'T HAVE MONEY TO GET MORE.: NEVER TRUE

## 2021-12-01 SDOH — ECONOMIC STABILITY: FOOD INSECURITY: WITHIN THE PAST 12 MONTHS, YOU WORRIED THAT YOUR FOOD WOULD RUN OUT BEFORE YOU GOT MONEY TO BUY MORE.: NEVER TRUE

## 2021-12-01 ASSESSMENT — SOCIAL DETERMINANTS OF HEALTH (SDOH): HOW HARD IS IT FOR YOU TO PAY FOR THE VERY BASICS LIKE FOOD, HOUSING, MEDICAL CARE, AND HEATING?: NOT HARD AT ALL

## 2021-12-01 ASSESSMENT — PATIENT HEALTH QUESTIONNAIRE - PHQ9
SUM OF ALL RESPONSES TO PHQ9 QUESTIONS 1 & 2: 0
1. LITTLE INTEREST OR PLEASURE IN DOING THINGS: 0
SUM OF ALL RESPONSES TO PHQ QUESTIONS 1-9: 0
2. FEELING DOWN, DEPRESSED OR HOPELESS: 0

## 2021-12-01 NOTE — PROGRESS NOTES
evident masses, lesions, foreign bodies, or other abnormalities. No edema. No tenderness on palpation. Joints are stable. Full ROM, strength and tone are within normal limits. Back: non-tender, no obvious deformity, no swelling, no crepitus, no bony tenderness  · Neurological: Alert and oriented to person, place, and time. Normal motor function, Normal sensory function, No focal deficits noted. He has normal strength. · Skin: Skin is warm, dry and intact. No obvious lesions on exposed skin  · Psychiatric: Normal mood and affect. Speech is normal and behavior is normal.       Nursing note and vitals reviewed. Blood pressure 137/84, pulse 66, temperature 97 °F (36.1 °C), temperature source Temporal, weight 187 lb (84.8 kg), SpO2 97 %. Body mass index is 33.13 kg/m². Wt Readings from Last 3 Encounters:   12/01/21 187 lb (84.8 kg)   06/01/21 188 lb 12.8 oz (85.6 kg)   02/19/21 188 lb (85.3 kg)     BP Readings from Last 3 Encounters:   12/01/21 137/84   06/01/21 130/70   02/19/21 (!) 154/68       No results found for this visit on 12/01/21. No results found for this visit on 12/01/21. Completed Orders/Prescriptions   No orders of the defined types were placed in this encounter. AssessmentPlan/Medical Decision Making     1. Essential hypertension  - controlled  - seeing [address] [facility] - will need to verify doses for medications    2. Gastroesophageal reflux disease without esophagitis  - controlled  - continue omeprazole 20mg daily    3. Hyperlipidemia, unspecified hyperlipidemia type  - continue atorvastatin 20mg daily    4. Chronic bilateral low back pain without sciatica  - meloxicam/tylenol as needed      Return in about 6 months (around 6/1/2022) for Routine follow up of chronic conditions. 1.  Steve Faustin received counseling on the following healthy behaviors: nutrition, exercise and medication adherence  2. Patient given educational materials - see patient instructions  3.   Was a self-tracking handout given in paper form or via [employer]? No  If yes, see orders or list here. 4.  Discussed use, benefit, and side effects of prescribed medications. Barriers to medication compliance addressed. All patient questions answered. Pt voiced understanding. 5.  Reviewed prior labs, imaging, consultation, follow up, and health maintenance  6. Continue current medications, diet and exercise. 7. Discussed use, benefit, and side effects of prescribed medications. Barriers to medication compliance addressed. All her questions were answered. Pt voiced understanding. Noemy Cheema will continue current medications, diet and exercise. Patient given educational materials on DASH diet    Medical decision making: Low  Of the 20 minute duration appointment visit, Marilee Shin CNP spent at least 50% of the face-to-face time in counseling, explanation of diagnosis, planning of further management, and answering all questions. Signed:  Marilee Shin CNP    This note is created with the assistance of a speech-recognition program.  While intending to generate a document that actually reflects the content of the visit, no guarantees can be provided that every mistake has been identified and corrected by editing.

## 2022-04-14 DIAGNOSIS — K21.9 GASTROESOPHAGEAL REFLUX DISEASE WITHOUT ESOPHAGITIS: ICD-10-CM

## 2022-04-14 RX ORDER — OMEPRAZOLE 20 MG/1
CAPSULE, DELAYED RELEASE ORAL
Qty: 90 CAPSULE | Refills: 1 | Status: SHIPPED | OUTPATIENT
Start: 2022-04-14

## 2022-04-14 NOTE — TELEPHONE ENCOUNTER
Trey Gonzalez is calling to request a refill on the following medication(s):    Medication Request:  Requested Prescriptions     Pending Prescriptions Disp Refills    omeprazole (PRILOSEC) 20 MG delayed release capsule [Pharmacy Med Name: OMEPRAZOLE DR 20 MG CAPSULE] 90 capsule 1     Sig: take 1 capsule by mouth once daily       Last Visit Date (If Applicable):  52/6/5780    Next Visit Date:    6/8/2022

## 2022-06-08 ENCOUNTER — OFFICE VISIT (OUTPATIENT)
Dept: FAMILY MEDICINE CLINIC | Age: 87
End: 2022-06-08
Payer: COMMERCIAL

## 2022-06-08 VITALS
HEART RATE: 69 BPM | TEMPERATURE: 97.3 F | OXYGEN SATURATION: 97 % | WEIGHT: 190 LBS | DIASTOLIC BLOOD PRESSURE: 80 MMHG | HEIGHT: 63 IN | SYSTOLIC BLOOD PRESSURE: 138 MMHG | BODY MASS INDEX: 33.66 KG/M2

## 2022-06-08 DIAGNOSIS — Z71.89 ACP (ADVANCE CARE PLANNING): ICD-10-CM

## 2022-06-08 DIAGNOSIS — G89.29 CHRONIC BILATERAL LOW BACK PAIN WITHOUT SCIATICA: ICD-10-CM

## 2022-06-08 DIAGNOSIS — M54.50 CHRONIC BILATERAL LOW BACK PAIN WITHOUT SCIATICA: ICD-10-CM

## 2022-06-08 DIAGNOSIS — K21.9 GASTROESOPHAGEAL REFLUX DISEASE WITHOUT ESOPHAGITIS: ICD-10-CM

## 2022-06-08 DIAGNOSIS — E78.5 HYPERLIPIDEMIA, UNSPECIFIED HYPERLIPIDEMIA TYPE: ICD-10-CM

## 2022-06-08 DIAGNOSIS — Z00.00 MEDICARE ANNUAL WELLNESS VISIT, SUBSEQUENT: Primary | ICD-10-CM

## 2022-06-08 DIAGNOSIS — I10 ESSENTIAL HYPERTENSION: ICD-10-CM

## 2022-06-08 PROCEDURE — 99497 ADVNCD CARE PLAN 30 MIN: CPT | Performed by: NURSE PRACTITIONER

## 2022-06-08 PROCEDURE — 99214 OFFICE O/P EST MOD 30 MIN: CPT | Performed by: NURSE PRACTITIONER

## 2022-06-08 PROCEDURE — 1123F ACP DISCUSS/DSCN MKR DOCD: CPT | Performed by: NURSE PRACTITIONER

## 2022-06-08 PROCEDURE — G0439 PPPS, SUBSEQ VISIT: HCPCS | Performed by: NURSE PRACTITIONER

## 2022-06-08 ASSESSMENT — LIFESTYLE VARIABLES: HOW OFTEN DO YOU HAVE A DRINK CONTAINING ALCOHOL: NEVER

## 2022-06-08 ASSESSMENT — PATIENT HEALTH QUESTIONNAIRE - PHQ9
SUM OF ALL RESPONSES TO PHQ9 QUESTIONS 1 & 2: 0
SUM OF ALL RESPONSES TO PHQ QUESTIONS 1-9: 0
SUM OF ALL RESPONSES TO PHQ QUESTIONS 1-9: 0
1. LITTLE INTEREST OR PLEASURE IN DOING THINGS: 0
SUM OF ALL RESPONSES TO PHQ QUESTIONS 1-9: 0
SUM OF ALL RESPONSES TO PHQ QUESTIONS 1-9: 0
2. FEELING DOWN, DEPRESSED OR HOPELESS: 0

## 2022-06-08 NOTE — PATIENT INSTRUCTIONS
Advance Directives: Care Instructions  Overview  An advance directive is a legal way to state your wishes at the end of your life. It tells your family and your doctor what to do if you can't say what youwant. There are two main types of advance directives. You can change them any timeyour wishes change. Living will. This form tells your family and your doctor your wishes about life support and other treatment. The form is also called a declaration. Medical power of . This form lets you name a person to make treatment decisions for you when you can't speak for yourself. This person is called a health care agent (health care proxy, health care surrogate). The form is also called a durable power of  for health care. If you do not have an advance directive, decisions about your medical care maybe made by a family member, or by a doctor or a  who doesn't know you. It may help to think of an advance directive as a gift to the people who carefor you. If you have one, they won't have to make tough decisions by themselves. Follow-up care is a key part of your treatment and safety. Be sure to make and go to all appointments, and call your doctor if you are having problems. It's also a good idea to know your test results and keep alist of the medicines you take. What should you include in an advance directive? Many states have a unique advance directive form. (It may ask you to address specific issues.) Or you might use a universal form that's approved by manystates. If your form doesn't tell you what to address, it may be hard to know what to include in your advance directive. Use the questions below to help you getstarted.  Who do you want to make decisions about your medical care if you are not able to?  What life-support measures do you want if you have a serious illness that gets worse over time or can't be cured?  What are you most afraid of that might happen?  (Maybe you're life meaningful for you.  Understands your Protestant and moral values.  Will do what you want, not what he or she wants.  Will be able to make difficult choices at a stressful time.  Will be able to refuse or stop treatment, if that is what you would want, even if you could die.  Will be firm and confident with health professionals if needed.  Will ask questions to get needed information.  Lives near you or agrees to travel to you if needed. Your family may help you make medical decisions while you can still be part of that process. But it's important to choose one person to be your health careagent in case you aren't able to make decisions for yourself. If you don't fill out the legal form and name a health care agent, thedecisions your family can make may be limited. A health care agent may be called something else in your state. Who will make decisions for you if you don't have a health care agent? If you don't have a health care agent or a living will, you may not get the care you want. Decisions may be made by family members who disagree about your medical care. Or decisions may be made by a medical professional who doesn'tknow you well. In some cases, a  makes the decisions. When you name a health care agent, it is very clear who has the power to [place] decisions for you. How do you name a health care agent? You name your health care agent on a legal form. This form is usually called a medical power of . Ask your hospital, state bar association, or officeon aging where to find these forms. You must sign the form to make it legal. Some states require you to get the form notarized. This means that a person called a  watches you sign the form and then he or she signs the form. Some states also require thattwo or more witnesses sign the form. Be sure to tell your family members and doctors who your health care agent is. Where can you learn more?   Go to [email or web address] and sign in to your [employer] account. Enter [id number] in the [facility] box to learn more about \"Learning About [address]. \"     If you do not have an account, please click on the \"Sign Up Now\" link. Current as of: October 18, 2021               Content Version: 13.2  © 2006-2022 Healthwise, [employer]. Care instructions adapted under license by [facility] ([facility]). If you have questions about a medical condition or this instruction, always ask your healthcare professional. [address] any warranty or liability for your use of this information. Learning About Living [place]  What is a living will? A living will, also called a declaration, is a legal form. It tells your family and your doctor your wishes when you can't speak for yourself. It's used by the health professionals who will treat you as you near the end of your life or ifyou get seriously hurt or ill. If you put your wishes in writing, your loved ones and others will know what kind of care you want. They won't need to guess. This can ease your mind and behelpful to others. And you can change or cancel your living will at any time. A living will is not the same as an estate or property will. An estate willexplains what you want to happen with your money and property after you die. How do you use it? Keep these facts in mind about how a living will is used.  Your living will is used only if you can't speak or make decisions for yourself. Most often, one or more doctors must certify that you can't speak or decide for yourself before your living will takes effect.  If you get better and can speak for yourself again, you can accept or refuse any treatment. It doesn't matter what you said in your living will.  Some states may limit your right to refuse treatment in certain cases.  For example, you may need to clearly state in your living will that you don't want artificial hydration and nutrition, such as being fed through a tube. Is a living will a legal document? A living will is a legal document. Each state has its own laws about livingwills. And a living will may be called something else in your state. Here are some things to know about living nevarez.  You don't need an  to complete a living will. But legal advice can be helpful if your state's laws are unclear. It can also help if your health history is complicated or your family can't agree on what should be in your living will.  You can change your living will at any time. Some people find that their wishes about end-of-life care change as their health changes. If you make big changes to your living will, complete a new form.  If you move to another state, make sure that your living will is legal in the state where you now live. In most cases, doctors will respect your wishes even if you have a form from a different state.  You might use a universal form that has been approved by many states. This kind of form can sometimes be filled out and stored online. Your digital copy will then be available wherever you have a connection to the internet. The doctors and nurses who need to treat you can find it right away.  Your state may offer an online registry. This is another place where you can store your living will online.  It's a good idea to get your living will notarized. This means using a person called a  to watch two people sign, or witness, your living will. What should you know when you create a living will? Here are some questions to ask yourself as you make your living will.  Do you know enough about life support methods that might be used? If not, talk to your doctor so you know what might be done if you can't breathe on your own, your heart stops, or you can't swallow.  What things would you still want to be able to do after you receive life-support methods?  Would you want to be able to walk? To speak? To eat on your own? To live without the help of machines?  Do you want certain Moravian practices performed if you become very ill?  If you have a choice, where do you want to be cared for? In your home? At a hospital or nursing home?  If you have a choice at the end of your life, where would you prefer to die? At home? In a hospital or nursing home? Somewhere else?  Would you prefer to be buried or cremated?  Do you want your organs to be donated after you die? What should you do with your living will?  Make sure that your family members and your health care agent have copies of your living will (also called a declaration).  Give your doctor a copy of your living will. Ask to have it kept as part of your medical record. If you have more than one doctor, make sure that each one has a copy.  Put a copy of your living will where it can be easily found. For example, some people may put a copy on their refrigerator door. If you are using a digital copy, be sure your doctor, family members, and health care agent know how to find and access it. Where can you learn more? Go to [email or web address] and sign in to your [employer] account. Enter B168 in the [facility] box to learn more about \"Learning About Living [place]. \"     If you do not have an account, please click on the \"Sign Up Now\" link. Current as of: October 18, 2021               Content Version: 13.2  © 2006-2022 Healthwise, Incorporated. Care instructions adapted under license by [facility] ([facility]). If you have questions about a medical condition or this instruction, always ask your healthcare professional. [place] [address] any warranty or liability for your use of this information. Personalized Preventive Plan for Doron Schroeder - 6/8/2022  Medicare offers a range of preventive health benefits.  Some of the tests and screenings are paid in full while other may be subject to a deductible, co-insurance, and/or copay. Some of these benefits include a comprehensive review of your medical history including lifestyle, illnesses that may run in your family, and various assessments and screenings as appropriate. After reviewing your medical record and screening and assessments performed today your provider may have ordered immunizations, labs, imaging, and/or referrals for you. A list of these orders (if applicable) as well as your Preventive Care list are included within your After Visit Summary for your review. Other Preventive Recommendations:    · A preventive eye exam performed by an eye specialist is recommended every 1-2 years to screen for glaucoma; cataracts, macular degeneration, and other eye disorders. · A preventive dental visit is recommended every 6 months. · Try to get at least 150 minutes of exercise per week or 10,000 steps per day on a pedometer . · Order or download the FREE \"Exercise & Physical Activity: Your Everyday Guide\" from [employer]. Call 3-106.847.6342 or search [employer] online. · You need 0641-7506 mg of calcium and 8980-2279 IU of vitamin D per day. It is possible to meet your calcium requirement with diet alone, but a vitamin D supplement is usually necessary to meet this goal.  · When exposed to the sun, use a sunscreen that protects against both UVA and UVB radiation with an SPF of 30 or greater. Reapply every 2 to 3 hours or after sweating, drying off with a towel, or swimming. · Always wear a seat belt when traveling in a car. Always wear a helmet when riding a bicycle or motorcycle.

## 2022-06-08 NOTE — PROGRESS NOTES
Helio Jimenes, FNP-C  [address]  [address] [address] [facility]  [phone]  [phone]    Vika Parker is a 80 y.o. female who is here with c/o of:    Chief Complaint: Medicare AWV (chronic back pain, hypertension)      Patient Accompanied by: n/a    HPI - Vika Parker is here today for f/u:    HTN   - she currently denies h/a, c/p, palpitations, sob, difficulty breathing, lower extremity edema   - current meds: metoprolol 25mg daily, losartan 100mg daily, lasix 20mg dailyand reports compliance and denies adverse affects of the medications   - she sees [address] [facility]    Back pain   - this is a chronic condition of many years   - she reports generalized aching pain across her entire lower back with concentration on the left   - she denies radiation down either leg   - she has taken meloxicam 7.5mg for this and feels that it helps some as well as Voltaren gel, but not taking the meloxicam regularly d/t her b/p being high at last visit and states it doesn't work very well    CREATININE   Date Value Ref Range Status   05/05/2021 0.88 0.50 - 0.90 mg/dL Final   05/05/2021 0.88 0.50 - 0.90 mg/dL Final     GFR Non-   Date Value Ref Range Status   05/05/2021 >60 >60 mL/min Final   05/05/2021 >60 >60 mL/min Final     GERD   - she is currently taking omeprazole 20mg daily and this is controlling her symptoms      Patient Active Problem List    Diagnosis Date Noted    Postoperative atrial fibrillation ([address] 02/19/2021    OA (osteoarthritis) 07/24/2013    HTN (hypertension) 07/24/2013    GERD (gastroesophageal reflux disease) 07/24/2013        No past medical history on file. Past Surgical History:   Procedure Laterality Date    MITRAL VALVE SURGERY  3/14/2016    [facility] [place]/Dr. Camargo Hasting     No family history on file. Social History     Tobacco Use    Smoking status: Never Smoker    Smokeless tobacco: Never Used   Substance Use Topics    Alcohol use:  No ALLERGIES:  No Known Allergies       Subjective     · Constitutional:  Negative for activity change, appetite change,unexpected weight change, chills, fever, and fatigue. · HENT: Negative for ear pain, sore throat,  Rhinorrhea, sinus pain, sinus pressure, congestion. · Eyes:  Negative for pain and discharge. · Respiratory:  Negative for chest tightness, shortness of breath, wheezing, and cough. · Cardiovascular:  Negative for chest pain, palpitations and leg swelling. · Gastrointestinal: Negative for abdominal pain, blood in stool, constipation,diarrhea, nausea and vomiting. · Endocrine: Negative for cold intolerance, heat intolerance, polydipsia, polyphagia and polyuria. · Genitourinary: Negative for difficulty urinating, dysuria, flank pain, frequency, hematuria and urgency. · Musculoskeletal: Negative for arthralgias, joint swelling, myalgias, neck pain and neck stiffness. Positive for back pain  · Skin: Negative for rash and wound. · Allergic/Immunologic: Negative for environmental allergies and food allergies. · Neurological:  Negative for dizziness, light-headedness, numbness and headaches. · Hematological:  Negative for adenopathy. Does not bruise/bleed easily. · Psychiatric/Behavioral: Negative for self-injury, sleep disturbance and suicidal ideas. Objective     · Constitutional: Orlando Beckwith is oriented to person, place, and time. Vital signs are normal. Appears well-developed and well-nourished. · HEENT:   · Head: Normocephalic and atraumatic. Right Ear: Hearing and external ear normal. TM normal. Canal Normal  Left Ear: Hearing and external ear normal. TM normal. Canal Normal  · Nose:  Nares normal. Septum midline. Mucosa normal. No drainage or sinus tenderness. · Mouth/Throat: Oropharynx-no erythema, no exudate. Uvula midline, no erythema, no edema. Mucous membranes are pink and moist.   · Eyes:PERRL, EOMI, Conjunctiva normal, No discharge.     · Neck: Trachea normal, full passive range of motion. Non-tender on palpation. Neck supple. No thyromegaly present. · Cardiovascular: Normal rate, regular rhythm, S1, S2, no murmur, no gallop, no friction rub, intact distal pulses. · Pulmonary/Chest: Breath sounds are clear throughout, No respiratory distress, No wheezing, No chest tenderness. Effort normal  · Musculoskeletal: Back: no bony tenderness, spinal curvature, decreased ROM with discomfort  · Lymphadenopathy: No lymphadenopathy noted. · Neurological: Alert and oriented to person, place, and time. Normal motor function, Normal sensory function, No focal deficits noted. He has normal strength. · Skin: Skin is warm, dry and intact. No obvious lesions on exposed skin  · Psychiatric: Normal mood and affect. Speech is normal and behavior is normal.       Nursing note and vitals reviewed. Blood pressure 138/80, pulse 69, temperature 97.3 °F (36.3 °C), temperature source Temporal, height 5' 3\" (1.6 m), weight 190 lb (86.2 kg), SpO2 97 %. Body mass index is 33.66 kg/m². Wt Readings from Last 3 Encounters:   06/08/22 190 lb (86.2 kg)   12/01/21 187 lb (84.8 kg)   06/01/21 188 lb 12.8 oz (85.6 kg)     BP Readings from Last 3 Encounters:   06/08/22 138/80   12/01/21 137/84   06/01/21 130/70     No results found for this visit on 06/08/22. Completed Orders/Prescriptions   Orders Placed This Encounter   Medications    diclofenac (VOLTAREN) 50 MG EC tablet     Sig: Take 1 tablet by mouth 3 times daily as needed for Pain     Dispense:  60 tablet     Refill:  3               AssessmentPlan/Medical Decision Making     1. Medicare annual wellness visit, subsequent    2. ACP (advance care planning)  - [place] ADVANCED CARE PLAN FACE TO [facility], [address] [[id number]]    3. Essential hypertension  - controlled - f/u and tx plan per cardiology  - reviewed DASH diet  - CBC with Auto Differential; Future  - Comprehensive Metabolic Panel; Future    4.  Hyperlipidemia, unspecified hyperlipidemia type  - CBC with Auto Differential; Future  - Comprehensive Metabolic Panel; Future  - Lipid, Fasting; Future    5. Gastroesophageal reflux disease without esophagitis  - controlled    6. Chronic bilateral low back pain without sciatica  - diclofenac (VOLTAREN) 50 MG EC tablet; Take 1 tablet by mouth 3 times daily as needed for Pain  Dispense: 60 tablet; Refill: 3  - External Referral To Massage Therapy    Follow up in 1 for chronic conditions    Return for Medicare Annual Wellness Visit in 1 year. 1.  Daylin Lawler received counseling on the following healthy behaviors: nutrition, exercise and medication adherence  2. Patient given educational materials - see patient instructions  3. Was a self-tracking handout given in paper form or via [employer]? No  If yes, see orders or list here. 4.  Discussed use, benefit, and side effects of prescribed medications. Barriers to medication compliance addressed. All patient questions answered. Pt voiced understanding. 5.  Reviewed prior labs, imaging, consultation, follow up, and health maintenance  6. Continue current medications, diet and exercise. 7. Discussed use, benefit, and side effects of prescribed medications. Barriers to medication compliance addressed. All her questions were answered. Pt voiced understanding. Daylin Lawler will continue current medications, diet and exercise. Patient given educational materials on DASH diet    Medical decision making: Moderate    Of the 30 minute duration appointment visit, eVl Rome CNP spent at least 50% of the face-to-face time in counseling, explanation of diagnosis, planning of further management, and answering all questions.     Signed:  Vel Rome CNP    This note is created with the assistance of a speech-recognition program.  While intending to generate a document that actually reflects the content of the visit, no guarantees can be provided that every mistake has been identified and corrected by editing. Medicare Annual Wellness Visit    Suellen Dixon is here for Medicare AWV (chronic back pain, hypertension)    Assessment & Plan   Medicare annual wellness visit, subsequent  ACP (advance care planning)  -     [address] [address] [facility] [address] [id number]  Essential hypertension  -     CBC with Auto Differential; Future  -     Comprehensive Metabolic Panel; Future  Hyperlipidemia, unspecified hyperlipidemia type  -     CBC with Auto Differential; Future  -     Comprehensive Metabolic Panel; Future  -     Lipid, Fasting; Future  Gastroesophageal reflux disease without esophagitis  Chronic bilateral low back pain without sciatica  -     diclofenac (VOLTAREN) 50 MG EC tablet; Take 1 tablet by mouth 3 times daily as needed for Pain, Disp-60 tablet, R-3Normal  -     External Referral To Massage Therapy      Recommendations for Preventive Services Due: see orders and patient instructions/AVS.  Recommended screening schedule for the next 5-10 years is provided to the patient in written form: see Patient Instructions/AVS.     Return for Medicare Annual Wellness Visit in 1 year. Subjective     Patient's complete Health Risk Assessment and screening values have been reviewed and are found in Flowsheets. The following problems were reviewed today and where indicated follow up appointments were made and/or referrals ordered.     Positive Risk Factor Screenings with Interventions:               General Health and ACP:  General  In general, how would you say your health is?: Very Good  In the past 7 days, have you experienced any of the following: New or Increased Pain, New or Increased Fatigue, Loneliness, Social Isolation, Stress or Anger?: No  Do you get the social and emotional support that you need?: Yes  Do you have a Living Will?: No - Advanced Care Planning Addressed and handouts provided    Advance Directives     Power of  Living Will ACP-Advance Directive ACP-Power of Mary Jane tablet Take 20 mg by mouth daily Yes Historical Provider, MD   diclofenac sodium (VOLTAREN) 1 % GEL Apply topically 2 times daily Yes WEI Del Rosario CNP   aspirin 81 MG EC tablet Take 81 mg by mouth daily Yes Historical Provider, MD   MULTIPLE VITAMIN PO Take  by mouth daily. Yes Historical Provider, MD   calcium carbonate (OSCAL) 500 MG TABS tablet Take 500 mg by mouth daily. Yes Historical Provider, MD Gonzalez (Including outside providers/suppliers regularly involved in providing care):   Patient Care Team:  WEI Del Rosario CNP as PCP - General (Nurse Practitioner)  WEI Del Rosario CNP as PCP - [facility] Empaneled Provider     Reviewed and updated this visit:  Tobacco  Allergies  Meds  Problems  Med Hx  Surg Hx  Soc Hx  Fam Hx                   Advance Care Planning   Advanced Care Planning: Discussed the patients choices for care and treatment in case of a health event that adversely affects decision-making abilities. Also discussed the patients long-term treatment options. Reviewed with the patient the appropriate state-specific advance directive documents. Reviewed the process of designating a competent adult as an Agent (or -in-fact) that could take make health care decisions for the patient if incompetent. Patient was asked to complete the declaration forms, either acknowledge the forms by a public notary or an eligible witness and provide a signed copy to the practice office.   Time spent (minutes): 5

## 2022-06-10 ENCOUNTER — HOSPITAL ENCOUNTER (OUTPATIENT)
Age: 87
Setting detail: SPECIMEN
Discharge: HOME | End: 2022-06-10

## 2022-06-10 DIAGNOSIS — I10 ESSENTIAL HYPERTENSION: ICD-10-CM

## 2022-06-10 DIAGNOSIS — E78.5 HYPERLIPIDEMIA, UNSPECIFIED HYPERLIPIDEMIA TYPE: ICD-10-CM

## 2022-06-10 LAB
ABSOLUTE EOS #: 0.17 K/UL (ref 0–0.44)
ABSOLUTE IMMATURE GRANULOCYTE: <0.03 K/UL (ref 0–0.3)
ABSOLUTE LYMPH #: 1.36 K/UL (ref 1.1–3.7)
ABSOLUTE MONO #: 0.57 K/UL (ref 0.1–1.2)
ALBUMIN SERPL-MCNC: 4.2 G/DL (ref 3.5–5.2)
ALBUMIN/GLOBULIN RATIO: 1.5 (ref 1–2.5)
ALP BLD-CCNC: 84 U/L (ref 35–104)
ALT SERPL-CCNC: 20 U/L (ref 5–33)
ANION GAP SERPL CALCULATED.3IONS-SCNC: 17 MMOL/L (ref 9–17)
AST SERPL-CCNC: 39 U/L
BASOPHILS # BLD: 1 % (ref 0–2)
BASOPHILS ABSOLUTE: 0.04 K/UL (ref 0–0.2)
BILIRUB SERPL-MCNC: 0.46 MG/DL (ref 0.3–1.2)
BUN BLDV-MCNC: 14 MG/DL (ref 8–23)
CALCIUM SERPL-MCNC: 9.4 MG/DL (ref 8.6–10.4)
CHLORIDE BLD-SCNC: 98 MMOL/L (ref 98–107)
CHOLESTEROL, FASTING: 137 MG/DL
CHOLESTEROL/HDL RATIO: 3.2
CO2: 22 MMOL/L (ref 20–31)
CREAT SERPL-MCNC: 0.88 MG/DL (ref 0.5–0.9)
EOSINOPHILS RELATIVE PERCENT: 3 % (ref 1–4)
GFR AFRICAN AMERICAN: >60 ML/MIN
GFR NON-AFRICAN AMERICAN: >60 ML/MIN
GFR SERPL CREATININE-BSD FRML MDRD: ABNORMAL ML/MIN/{1.73_M2}
GLUCOSE BLD-MCNC: 101 MG/DL (ref 70–99)
HCT VFR BLD CALC: 37.2 % (ref 36.3–47.1)
HDLC SERPL-MCNC: 43 MG/DL
HEMOGLOBIN: 12.1 G/DL (ref 11.9–15.1)
IMMATURE GRANULOCYTES: 0 %
LDL CHOLESTEROL: 73 MG/DL (ref 0–130)
LYMPHOCYTES # BLD: 23 % (ref 24–43)
MCH RBC QN AUTO: 28.4 PG (ref 25.2–33.5)
MCHC RBC AUTO-ENTMCNC: 32.5 G/DL (ref 28.4–34.8)
MCV RBC AUTO: 87.3 FL (ref 82.6–102.9)
MONOCYTES # BLD: 10 % (ref 3–12)
NRBC AUTOMATED: 0 PER 100 WBC
PDW BLD-RTO: 14.3 % (ref 11.8–14.4)
PLATELET # BLD: 219 K/UL (ref 138–453)
PMV BLD AUTO: 11 FL (ref 8.1–13.5)
POTASSIUM SERPL-SCNC: 5.1 MMOL/L (ref 3.7–5.3)
RBC # BLD: 4.26 M/UL (ref 3.95–5.11)
SEG NEUTROPHILS: 63 % (ref 36–65)
SEGMENTED NEUTROPHILS ABSOLUTE COUNT: 3.83 K/UL (ref 1.5–8.1)
SODIUM BLD-SCNC: 137 MMOL/L (ref 135–144)
TOTAL PROTEIN: 7 G/DL (ref 6.4–8.3)
TRIGLYCERIDE, FASTING: 104 MG/DL
WBC # BLD: 6 K/UL (ref 3.5–11.3)

## 2022-06-15 DIAGNOSIS — E78.5 HYPERLIPIDEMIA, UNSPECIFIED HYPERLIPIDEMIA TYPE: ICD-10-CM

## 2022-06-16 NOTE — TELEPHONE ENCOUNTER
Ras Singh is calling to request a refill on the following medication(s):    Medication Request:  Requested Prescriptions     Pending Prescriptions Disp Refills    atorvastatin (LIPITOR) 20 MG tablet [Pharmacy Med Name: ATORVASTATIN 20 MG TABLET] 90 tablet 3     Sig: take 1 tablet by mouth nightly       Last Visit Date (If Applicable):  6/2/3992    Next Visit Date:    6/9/2023

## 2022-06-20 RX ORDER — ATORVASTATIN CALCIUM 20 MG/1
20 TABLET, FILM COATED ORAL NIGHTLY
Qty: 90 TABLET | Refills: 1 | Status: SHIPPED | OUTPATIENT
Start: 2022-06-20

## 2022-07-15 DIAGNOSIS — I10 ESSENTIAL HYPERTENSION: ICD-10-CM

## 2022-07-18 NOTE — TELEPHONE ENCOUNTER
Tiara Byrd is calling to request a refill on the following medication(s):    Medication Request:  Requested Prescriptions     Pending Prescriptions Disp Refills    metoprolol tartrate (LOPRESSOR) 25 MG tablet [Pharmacy Med Name: METOPROLOL TARTRATE 25 MG TAB] 180 tablet 1     Sig: take 1 tablet by mouth twice a day       Last Visit Date (If Applicable):  0/3/3557 In office    Next Visit Date:    6/9/2023

## 2022-09-27 ENCOUNTER — OFFICE VISIT (OUTPATIENT)
Dept: FAMILY MEDICINE CLINIC | Age: 87
End: 2022-09-27
Payer: COMMERCIAL

## 2022-09-27 VITALS
DIASTOLIC BLOOD PRESSURE: 70 MMHG | HEART RATE: 71 BPM | BODY MASS INDEX: 33.48 KG/M2 | OXYGEN SATURATION: 97 % | SYSTOLIC BLOOD PRESSURE: 124 MMHG | WEIGHT: 189 LBS

## 2022-09-27 DIAGNOSIS — M79.671 RIGHT FOOT PAIN: Primary | ICD-10-CM

## 2022-09-27 DIAGNOSIS — I10 ESSENTIAL HYPERTENSION: ICD-10-CM

## 2022-09-27 DIAGNOSIS — Z76.89 ENCOUNTER TO ESTABLISH CARE: ICD-10-CM

## 2022-09-27 PROCEDURE — 99214 OFFICE O/P EST MOD 30 MIN: CPT | Performed by: NURSE PRACTITIONER

## 2022-09-27 PROCEDURE — 1123F ACP DISCUSS/DSCN MKR DOCD: CPT | Performed by: NURSE PRACTITIONER

## 2022-09-27 RX ORDER — LOSARTAN POTASSIUM 100 MG/1
TABLET ORAL
COMMUNITY
Start: 2022-07-21

## 2022-09-27 ASSESSMENT — ENCOUNTER SYMPTOMS
DIARRHEA: 0
SINUS PRESSURE: 0
ABDOMINAL PAIN: 0
BLOOD IN STOOL: 0
CHEST TIGHTNESS: 0
RHINORRHEA: 0
COUGH: 0
SHORTNESS OF BREATH: 0
CONSTIPATION: 0
EYE DISCHARGE: 0

## 2022-09-27 NOTE — PROGRESS NOTES
Ehsan Phelan (:  1934) is a 80 y.o. female,Established patient, here for evaluation of the following chief complaint(s):  Establish Care         ASSESSMENT/PLAN:  1. Right foot pain  -     XR FOOT RIGHT (MIN 3 VIEWS); Future  2. Encounter to establish care  3. Essential hypertension  -     metoprolol tartrate (LOPRESSOR) 25 MG tablet; take 1 tablet by mouth twice a day, Disp-180 tablet, R-1Normal    No follow-ups on file. Subjective   SUBJECTIVE/OBJECTIVE:  HPI  [employer] and hit her right foot about a week ago. Still painful, still bruised. Still swollen. Painful to walk or bear weight. [facility]- [facility]  Diet- \"not healthy\"    Review of Systems   Constitutional:  Negative for activity change, appetite change, chills, fatigue and fever. HENT:  Negative for congestion, ear pain, rhinorrhea and sinus pressure. Eyes:  Negative for discharge and visual disturbance. Respiratory:  Negative for cough, chest tightness and shortness of breath. Cardiovascular:  Negative for chest pain, palpitations and leg swelling. Gastrointestinal:  Negative for abdominal pain, blood in stool, constipation and diarrhea. Endocrine: Negative for cold intolerance and heat intolerance. Genitourinary:  Negative for difficulty urinating and hematuria. Musculoskeletal:  Negative for arthralgias (right foot) and myalgias. Skin:  Negative for rash. Neurological:  Negative for dizziness, light-headedness and headaches. Psychiatric/Behavioral:  Negative for dysphoric mood and self-injury. Objective   Vitals:    22 0918   BP: 124/70   Pulse: 71   SpO2: 97%     Wt Readings from Last 3 Encounters:   22 189 lb (85.7 kg)   22 190 lb (86.2 kg)   21 187 lb (84.8 kg)       Physical Exam  Constitutional:       Appearance: She is well-developed.    HENT:      Right Ear: External ear normal.      Left Ear: External ear normal.      Nose: Nose normal. Cardiovascular:      Rate and Rhythm: Normal rate and regular rhythm. Pulses:           Dorsalis pedis pulses are 2+ on the right side. Heart sounds: Normal heart sounds, S1 normal and S2 normal.   Pulmonary:      Effort: Pulmonary effort is normal. No respiratory distress. Breath sounds: Normal breath sounds. Musculoskeletal:         General: No deformity. Cervical back: Full passive range of motion without pain and normal range of motion. Right foot: Decreased range of motion. Feet:      Right foot:      Skin integrity: No erythema or warmth. Toenail Condition: Right toenails are normal.   Skin:     General: Skin is warm and dry. Neurological:      Mental Status: She is alert and oriented to person, place, and time. An electronic signature was used to authenticate this note.     --Renetta Gilliland, APRN - CNP

## 2022-09-27 NOTE — PROGRESS NOTES
Patient is present to establish care    Patient states she fall/tripped last week  Patient states she hurt her right foot  Patient states her foot is swelling and was bruised

## 2022-10-04 ENCOUNTER — HOSPITAL ENCOUNTER (OUTPATIENT)
Facility: CLINIC | Age: 87
Discharge: HOME | End: 2022-10-06
Payer: COMMERCIAL

## 2022-10-04 ENCOUNTER — HOSPITAL ENCOUNTER (OUTPATIENT)
Dept: GENERAL RADIOLOGY | Facility: CLINIC | Age: 87
Discharge: HOME | End: 2022-10-06
Payer: COMMERCIAL

## 2022-10-04 DIAGNOSIS — M79.671 RIGHT FOOT PAIN: ICD-10-CM

## 2022-10-04 PROCEDURE — 73630 X-RAY EXAM OF FOOT: CPT

## 2022-10-05 DIAGNOSIS — T14.8XXA FRACTURE: ICD-10-CM

## 2022-10-05 DIAGNOSIS — M79.671 RIGHT FOOT PAIN: Primary | ICD-10-CM

## 2022-10-10 ENCOUNTER — HOSPITAL ENCOUNTER (OUTPATIENT)
Dept: MRI IMAGING | Age: 87
Discharge: HOME | End: 2022-10-12
Payer: MEDICARE

## 2022-10-10 ENCOUNTER — OFFICE VISIT (OUTPATIENT)
Dept: ORTHOPEDIC SURGERY | Age: 87
End: 2022-10-10
Payer: MEDICARE

## 2022-10-10 VITALS — HEIGHT: 63 IN | RESPIRATION RATE: 16 BRPM | OXYGEN SATURATION: 100 % | BODY MASS INDEX: 33.49 KG/M2 | WEIGHT: 189 LBS

## 2022-10-10 DIAGNOSIS — S92.321A CLOSED DISPLACED FRACTURE OF SECOND METATARSAL BONE OF RIGHT FOOT, INITIAL ENCOUNTER: Primary | ICD-10-CM

## 2022-10-10 DIAGNOSIS — M79.671 RIGHT FOOT PAIN: Primary | ICD-10-CM

## 2022-10-10 DIAGNOSIS — S92.321A CLOSED DISPLACED FRACTURE OF SECOND METATARSAL BONE OF RIGHT FOOT, INITIAL ENCOUNTER: ICD-10-CM

## 2022-10-10 DIAGNOSIS — S93.324A DISLOCATION OF TARSOMETATARSAL JOINT OF RIGHT FOOT, INITIAL ENCOUNTER: ICD-10-CM

## 2022-10-10 PROCEDURE — 1123F ACP DISCUSS/DSCN MKR DOCD: CPT | Performed by: ORTHOPAEDIC SURGERY

## 2022-10-10 PROCEDURE — 73718 MRI LOWER EXTREMITY W/O DYE: CPT

## 2022-10-10 PROCEDURE — 99204 OFFICE O/P NEW MOD 45 MIN: CPT | Performed by: ORTHOPAEDIC SURGERY

## 2022-10-10 RX ORDER — ASPIRIN 325 MG
325 TABLET, DELAYED RELEASE (ENTERIC COATED) ORAL DAILY
Qty: 42 TABLET | Refills: 0 | Status: SHIPPED | OUTPATIENT
Start: 2022-10-10 | End: 2022-11-21

## 2022-10-10 NOTE — PROGRESS NOTES
[facility].  [address] [address]  Dept: [phone]    Ambulatory Orthopedic Consult      CHIEF COMPLAINT:    Chief Complaint   Patient presents with    Foot Pain     Right       HISTORY OF PRESENT ILLNESS:      The patient is a 80 y.o. female who is being seen for evaluation of the above, which began around 9/19/2022 secondary to a trip and fall from standing height  . At today's visit, she is using no brace/assistive device. History is obtained today from:   [x]  the patient     [x]  EMR     []  one family member/friend    []  multiple family members/friends    []  other:          REVIEW OF SYSTEMS:  Musculoskeletal: See HPI for pertinent positives     Past Medical History:    She  has a past medical history of Acid reflux, HTN (hypertension), and Hyperlipidemia. Past Surgical History:    She  has a past surgical history that includes Mitral valve surgery (03/14/2016); joint replacement (Bilateral); Nasal septum surgery; bladder suspension; Hysterectomy; and Appendectomy. Current Medications:     Current Outpatient Medications:     losartan (COZAAR) 100 MG tablet, take 1 tablet by mouth once daily, Disp: , Rfl:     metoprolol tartrate (LOPRESSOR) 25 MG tablet, take 1 tablet by mouth twice a day, Disp: 180 tablet, Rfl: 1    atorvastatin (LIPITOR) 20 MG tablet, take 1 tablet by mouth nightly, Disp: 90 tablet, Rfl: 1    omeprazole (PRILOSEC) 20 MG delayed release capsule, take 1 capsule by mouth once daily, Disp: 90 capsule, Rfl: 1    furosemide (LASIX) 20 MG tablet, Take 20 mg by mouth daily, Disp: , Rfl:     aspirin 81 MG EC tablet, Take 81 mg by mouth daily, Disp: , Rfl:     MULTIPLE VITAMIN PO, Take  by mouth daily. , Disp: , Rfl:     calcium carbonate (OSCAL) 500 MG TABS tablet, Take 500 mg by mouth daily. , Disp: , Rfl:      Allergies:    Diclofenac    Family History:  family history includes Other in her father; Stroke in her mother. Social History:   Social History     Occupational History    Not on file   Tobacco Use    Smoking status: Never    Smokeless tobacco: Never   Substance and Sexual Activity    Alcohol use: No    Drug use: No    Sexual activity: Never     Retired    OBJECTIVE:  Resp 16   Ht 5' 3\" (1.6 m)   Wt 189 lb (85.7 kg)   SpO2 100%   BMI 33.48 kg/m²    Psych: awake, alert  Cardio:  well perfused extremities, no cyanosis  Resp:  normal respiratory effort  Musculoskeletal:    Affected lower extremity:    Vascular: Limb well perfused, compartments soft/compressible. Skin: No erythema/ulcers. Intact. Neurovascular Status:  Grossly neurovascularly intact throughout  Motion:  Grossly able to fire major muscle groups with appropriate/expected AROM  Tenderness to Palpation: Dorsal midfoot  -Mild swelling/ecchymosis      RADIOLOGY:   10/10/2022 FINDINGS:  Three weightbearing views (AP, Mortise, and Lateral) of the right ankle and three weightbearing views (AP, Oblique, Lateral) of the right foot were obtained in the office today and reviewed, revealing fracture of the base of the second metatarsal.     IMPRESSION: Osseous injury as above. Electronically signed by Evi Dickerson MD      Relevant previous imaging reviewed, both imaging and report(s) as below:    XR FOOT RIGHT (MIN 3 VIEWS)    Result Date: 10/4/2022  Fracture at the proximal medial aspect 2nd metatarsal.  No additional fracture definitely identified. Consider CT follow-up for better characterization if indicated. ASSESSMENT AND PLAN:  Body mass index is 33.48 kg/m². She has a right Lisfranc type midfoot injury with fracture at the base of the second metatarsal, sustained around 9/19/2022. Notably, she has a somewhat complex past medical history. She has a history of GERD, and coronary artery disease (status post open heart surgery in 2016; denies any current anticoagulation).     We had a discussion today about the likely diagnosis and its natural history, physical exam and imaging findings, as well as various treatment options in detail. Surgically, we discussed a possible right Lisfranc ORIF, depending on her imaging and clinical course. Orders/referrals were placed as below at today's visit. The patient was placed into an Ace wrap and a CAM boot. We discussed that the patient may weight-bear as tolerated, and may remove the boot at night to sleep. We also had a discussion about the risk of blood clot and thromboembolic events. The patient understands that there is an increased risk with surgery/immobilization, and understands nothing will completely eliminate the risk of DVT/PE's, and that any prophylactic medication does not substitute for early mobilization. Given her risk profile, I have recommended the following strategy to decrease the risk of blood clots, and the patient agrees and wishes to proceed:  Aspirin 325 mg PO q Day. We discussed that she will hold her baby aspirin at this time, but may resume in the future. In order to know exactly how to proceed with treatment, an MRI was ordered today to evaluate the midfoot and Lisfranc joint. This is medically necessary to evaluate the exact bony alignment/architecture. All questions were answered and the above plan was agreed upon. The patient will return to clinic after the MRI has been obtained. At her next visit, we will review her MRI, and possibly consider surgery.             At the patient's next visit, depending on how the patient is doing and/or new imaging/labs results, we may consider the following options:    []  Lace up ankle     []  CAM boot         []  removable wrist brace     []  PT:        []  Wean out immobilization         []  Adv activity      []  Footmind        []  Spenco       []  Custom Orthotic:               []  AZ brace                    []  Rocker Bottom      []  Night splint    []  Heel cups        [] Strap        []  Toe gizmos    []  Topl        []  NSAIDs         []  Anaid        []  Ref:         []  Stress Xray    []  CT        []  MRI  []  Inj:          []  Consider OR      []  Pick OR date    No follow-ups on file. No orders of the defined types were placed in this encounter. No orders of the defined types were placed in this encounter. Eufemia Camacho MD  Orthopedic Surgery        Please excuse any typos/errors, as this note was created with the assistance of voice recognition software. While intending to generate a document that actually reflects the content of the visit, the document can still have some errors including those of syntax and sound-a-like substitutions which may escape proof reading. In such instances, actual meaning can be extrapolated by context.

## 2022-10-17 ENCOUNTER — OFFICE VISIT (OUTPATIENT)
Dept: ORTHOPEDIC SURGERY | Age: 87
End: 2022-10-17
Payer: MEDICARE

## 2022-10-17 VITALS — WEIGHT: 189 LBS | HEIGHT: 63 IN | RESPIRATION RATE: 16 BRPM | OXYGEN SATURATION: 100 % | BODY MASS INDEX: 33.49 KG/M2

## 2022-10-17 DIAGNOSIS — S93.324D DISLOCATION OF TARSOMETATARSAL JOINT OF RIGHT FOOT, SUBSEQUENT ENCOUNTER: Primary | ICD-10-CM

## 2022-10-17 DIAGNOSIS — S62.309D CLOSED FRACTURE OF MULTIPLE METACARPAL BONES WITH ROUTINE HEALING, SUBSEQUENT ENCOUNTER: ICD-10-CM

## 2022-10-17 PROCEDURE — 1123F ACP DISCUSS/DSCN MKR DOCD: CPT | Performed by: ORTHOPAEDIC SURGERY

## 2022-10-17 PROCEDURE — 99214 OFFICE O/P EST MOD 30 MIN: CPT | Performed by: ORTHOPAEDIC SURGERY

## 2022-10-17 NOTE — PROGRESS NOTES
[facility] Andino [facility].  [address] [address]  Dept: [phone]    Ambulatory Orthopedic Consult      CHIEF COMPLAINT:    Chief Complaint   Patient presents with    Foot Pain     Right       HISTORY OF PRESENT ILLNESS:      The patient is a 80 y.o. female who is being seen for evaluation of the above, which began around 9/19/2022 secondary to a trip and fall from standing height  . At today's visit, she is using no brace/assistive device. History is obtained today from:   [x]  the patient     [x]  EMR     []  one family member/friend    []  multiple family members/friends    []  other:      INTERVAL HISTORY 10/17/2022:  She is seen again today in the office for follow up of imaging as below. Since being seen last, the patient is doing better. At today's visit, she is using a brace/boot. History is obtained today from:   [x]  the patient     [x]  EMR     []  one family member/friend    []  multiple family members/friends    []  other:        REVIEW OF SYSTEMS:  Musculoskeletal: See HPI for pertinent positives     Past Medical History:    She  has a past medical history of Acid reflux, HTN (hypertension), and Hyperlipidemia. Past Surgical History:    She  has a past surgical history that includes Mitral valve surgery (03/14/2016); joint replacement (Bilateral); Nasal septum surgery; bladder suspension; Hysterectomy; and Appendectomy.      Current Medications:     Current Outpatient Medications:     aspirin 325 MG EC tablet, Take 1 tablet by mouth daily, Disp: 42 tablet, Rfl: 0    losartan (COZAAR) 100 MG tablet, take 1 tablet by mouth once daily, Disp: , Rfl:     metoprolol tartrate (LOPRESSOR) 25 MG tablet, take 1 tablet by mouth twice a day, Disp: 180 tablet, Rfl: 1    atorvastatin (LIPITOR) 20 MG tablet, take 1 tablet by mouth nightly, Disp: 90 tablet, Rfl: 1    omeprazole (PRILOSEC) 20 MG delayed release capsule, take 1 capsule by mouth once daily, Disp: 90 capsule, Rfl: 1    furosemide (LASIX) 20 MG tablet, Take 20 mg by mouth daily, Disp: , Rfl:     aspirin 81 MG EC tablet, Take 81 mg by mouth daily, Disp: , Rfl:     MULTIPLE VITAMIN PO, Take  by mouth daily. , Disp: , Rfl:     calcium carbonate (OSCAL) 500 MG TABS tablet, Take 500 mg by mouth daily. , Disp: , Rfl:      Allergies:    Diclofenac    Family History:  family history includes Other in her father; Stroke in her mother. Social History:   Social History     Occupational History    Not on file   Tobacco Use    Smoking status: Never    Smokeless tobacco: Never   Substance and Sexual Activity    Alcohol use: No    Drug use: No    Sexual activity: Never     Retired    OBJECTIVE:  Resp 16   Ht 5' 3\" (1.6 m)   Wt 189 lb (85.7 kg)   SpO2 100%   BMI 33.48 kg/m²    Psych: awake, alert  Cardio:  well perfused extremities, no cyanosis  Resp:  normal respiratory effort  Musculoskeletal:    Affected lower extremity:    Vascular: Limb well perfused, compartments soft/compressible. Skin: No erythema/ulcers. Intact. Neurovascular Status:  Grossly neurovascularly intact throughout  Motion:  Grossly able to fire major muscle groups with appropriate/expected AROM  Tenderness to Palpation: Dorsal midfoot--improved  -Mild swelling/ecchymosis      RADIOLOGY:   10/17/2022 Prior images reviewed for reference. MRI images and radiology report reviewed, as below:    1. Acute Lisfranc injury with fractures of the 2nd and 3rd metatarsal bases   and cuneiforms and associated injury of the Lisfranc ligament. There is mild   dorsal subluxation of the 2nd metatarsal base in relation to the intermediate   cuneiform. 2. Diffuse soft tissue edema.      -Independent interpretation: Fractures at the base of the fourth and first metatarsals as well as the second and third as above.         FINDINGS:  Three weightbearing views (AP, Mortise, and Lateral) of the right ankle and three weightbearing views (AP, Oblique, Lateral) of the right foot were obtained in the office today and reviewed, revealing fracture of the base of the second metatarsal.     IMPRESSION: Osseous injury as above. Electronically signed by Raquel Williamson MD      Relevant previous imaging reviewed, both imaging and report(s) as below:    XR FOOT RIGHT (MIN 3 VIEWS)  Result Date: 10/4/2022  Fracture at the proximal medial aspect 2nd metatarsal.  No additional fracture definitely identified. Consider CT follow-up for better characterization if indicated. ASSESSMENT AND PLAN:  Body mass index is 33.48 kg/m². She has a right Lisfranc injury (fractures at the base of the first through fourth metatarsals and cuneiforms, Lisfranc ligament injury, mild dorsal subluxation of the second TMT joint), sustained around 9/19/2022. Notably, she has a somewhat complex past medical history. She has a history of GERD, and coronary artery disease (status post open heart surgery in 2016; denies any current anticoagulation). We had a discussion today about the likely diagnosis and its natural history, physical exam and imaging findings, as well as various treatment options in detail. Surgically, we again discussed a possible right Lisfranc ORIF, depending on her clinical course. At today's visit, we discussed her numerous fractures, as well as mild displacement/subluxation, and the risks of future pain/arthritis. We discussed both surgical and nonsurgical treatment options, including risk/benefits/alternatives. Given her advanced age and relatively low demand lifestyle, I do not believe that the patient would considerably benefit from surgery at this time, particularly given her mild displacement. We discussed the risks of further displacement. Orders/referrals were placed as below at today's visit. She may continue to use her Ace wrap and cam boot.   She will continue to take her daily aspirin 325 mg p.o. daily for DVT prophylaxis. All questions were answered and the above plan was agreed upon. The patient will return to clinic in 4 weeks with right foot x-rays. At her next visit, I anticipate continuing conservative management and keeping her in the boot for another 4 weeks. At the patient's next visit, depending on how the patient is doing and/or new imaging/labs results, we may consider the following options:    []  Lace up ankle     []  CAM boot         []  removable wrist brace     []  PT:        []  Wean out immobilization         []  Adv activity      []  Footmind        []  Spenco       []  Custom Orthotic:               []  AZ brace                    []  Rocker Bottom      []  Night splint    []  Heel cups        []  Strap        []  Toe gizmos    []  Topl        []  NSAIDs         []  Anaid        []  Ref:         []  Stress Xray    []  CT        []  MRI  []  Inj:          []  Consider OR      []  Pick OR date    No follow-ups on file. No orders of the defined types were placed in this encounter. No orders of the defined types were placed in this encounter. Shanita Moore MD  Orthopedic Surgery        Please excuse any typos/errors, as this note was created with the assistance of voice recognition software. While intending to generate a document that actually reflects the content of the visit, the document can still have some errors including those of syntax and sound-a-like substitutions which may escape proof reading. In such instances, actual meaning can be extrapolated by context.

## 2022-11-11 DIAGNOSIS — S93.324D DISLOCATION OF TARSOMETATARSAL JOINT OF RIGHT FOOT, SUBSEQUENT ENCOUNTER: Primary | ICD-10-CM

## 2022-11-14 ENCOUNTER — OFFICE VISIT (OUTPATIENT)
Dept: ORTHOPEDIC SURGERY | Age: 87
End: 2022-11-14
Payer: MEDICARE

## 2022-11-14 VITALS — RESPIRATION RATE: 16 BRPM | WEIGHT: 189 LBS | HEIGHT: 63 IN | OXYGEN SATURATION: 100 % | BODY MASS INDEX: 33.49 KG/M2

## 2022-11-14 DIAGNOSIS — S92.301D MULTIPLE CLOSED FRACTURES OF METATARSAL BONE OF RIGHT FOOT WITH ROUTINE HEALING, SUBSEQUENT ENCOUNTER: ICD-10-CM

## 2022-11-14 DIAGNOSIS — S93.324D DISLOCATION OF TARSOMETATARSAL JOINT OF RIGHT FOOT, SUBSEQUENT ENCOUNTER: Primary | ICD-10-CM

## 2022-11-14 PROCEDURE — 99212 OFFICE O/P EST SF 10 MIN: CPT | Performed by: ORTHOPAEDIC SURGERY

## 2022-11-14 PROCEDURE — 1123F ACP DISCUSS/DSCN MKR DOCD: CPT | Performed by: ORTHOPAEDIC SURGERY

## 2022-11-14 NOTE — PROGRESS NOTES
José Luis Andino [facility] 2.  [address] [address]  Dept: [phone]    Ambulatory Orthopedic Consult      CHIEF COMPLAINT:    Chief Complaint   Patient presents with    Foot Pain     Right       HISTORY OF PRESENT ILLNESS:      The patient is a 80 y.o. female who is being seen for evaluation of the above, which began around 9/19/2022 secondary to a trip and fall from standing height  . At today's visit, she is using no brace/assistive device. History is obtained today from:   [x]  the patient     [x]  EMR     []  one family member/friend    []  multiple family members/friends    []  other:      INTERVAL HISTORY 10/17/2022:  She is seen again today in the office for follow up of imaging as below. Since being seen last, the patient is doing better. At today's visit, she is using a brace/boot. History is obtained today from:   [x]  the patient     [x]  EMR     []  one family member/friend    []  multiple family members/friends    []  other:      INTERVAL HISTORY 11/14/2022:  She is seen again today in the office for follow up of a previous issue (as above). Since being seen last, the patient is doing better. At today's visit, she is using a removable brace. History is obtained today from:   [x]  the patient     []  EMR     []  one family member/friend    []  multiple family members/friends    []  other:        REVIEW OF SYSTEMS:  Musculoskeletal: See HPI for pertinent positives     Past Medical History:    She  has a past medical history of Acid reflux, HTN (hypertension), and Hyperlipidemia. Past Surgical History:    She  has a past surgical history that includes Mitral valve surgery (03/14/2016); joint replacement (Bilateral); Nasal septum surgery; bladder suspension; Hysterectomy; and Appendectomy.      Current Medications:     Current Outpatient Medications:     aspirin 325 MG EC tablet, Take 1 tablet by mouth daily, Disp: 42 tablet, Rfl: 0    losartan (COZAAR) 100 MG tablet, take 1 tablet by mouth once daily, Disp: , Rfl:     metoprolol tartrate (LOPRESSOR) 25 MG tablet, take 1 tablet by mouth twice a day, Disp: 180 tablet, Rfl: 1    atorvastatin (LIPITOR) 20 MG tablet, take 1 tablet by mouth nightly, Disp: 90 tablet, Rfl: 1    omeprazole (PRILOSEC) 20 MG delayed release capsule, take 1 capsule by mouth once daily, Disp: 90 capsule, Rfl: 1    furosemide (LASIX) 20 MG tablet, Take 20 mg by mouth daily, Disp: , Rfl:     aspirin 81 MG EC tablet, Take 81 mg by mouth daily, Disp: , Rfl:     MULTIPLE VITAMIN PO, Take  by mouth daily. , Disp: , Rfl:     calcium carbonate (OSCAL) 500 MG TABS tablet, Take 500 mg by mouth daily. , Disp: , Rfl:      Allergies:    Diclofenac    Family History:  family history includes Other in her father; Stroke in her mother. Social History:   Social History     Occupational History    Not on file   Tobacco Use    Smoking status: Never    Smokeless tobacco: Never   Substance and Sexual Activity    Alcohol use: No    Drug use: No    Sexual activity: Never     Retired    OBJECTIVE:  Resp 16   Ht 5' 3\" (1.6 m)   Wt 189 lb (85.7 kg)   SpO2 100%   BMI 33.48 kg/m²    Psych: awake, alert  Cardio:  well perfused extremities, no cyanosis  Resp:  normal respiratory effort  Musculoskeletal:    Affected lower extremity:    Vascular: Limb well perfused, compartments soft/compressible. Skin: No erythema/ulcers. Intact. Neurovascular Status:  Grossly neurovascularly intact throughout  Motion:  Grossly able to fire major muscle groups with appropriate/expected AROM  Tenderness to Palpation: Dorsal midfoot--improved  -Mild swelling/ecchymosis      RADIOLOGY:   11/14/2022 FINDINGS:  Three weightbearing views (AP, Oblique, Lateral) of the right foot were obtained in the office today and reviewed, revealing fracture of the base of the second metatarsal.  Interval healing noted.   Questionable interval widening of the Lisfranc joint. IMPRESSION: Osseous injury as above. Electronically signed by Qamar Suazo MD      MRI images and radiology report reviewed, as below:    1. Acute Lisfranc injury with fractures of the 2nd and 3rd metatarsal bases   and cuneiforms and associated injury of the Lisfranc ligament. There is mild   dorsal subluxation of the 2nd metatarsal base in relation to the intermediate   cuneiform. 2. Diffuse soft tissue edema.      -Independent interpretation: Fractures at the base of the fourth and first metatarsals as well as the second and third as above. FINDINGS:  Three weightbearing views (AP, Mortise, and Lateral) of the right ankle and three weightbearing views (AP, Oblique, Lateral) of the right foot were obtained in the office today and reviewed, revealing fracture of the base of the second metatarsal.     IMPRESSION: Osseous injury as above. Electronically signed by Qamar Suazo MD      Relevant previous imaging reviewed, both imaging and report(s) as below:    XR FOOT RIGHT (MIN 3 VIEWS)  Result Date: 10/4/2022  Fracture at the proximal medial aspect 2nd metatarsal.  No additional fracture definitely identified. Consider CT follow-up for better characterization if indicated. ASSESSMENT AND PLAN:  Body mass index is 33.48 kg/m². She has a right Lisfranc injury (fractures at the base of the first through fourth metatarsals and cuneiforms, Lisfranc ligament injury, mild dorsal subluxation of the second TMT joint), sustained around 9/19/2022. She is doing well overall, but does have subluxation of the second TMT joint. Notably, she has a somewhat complex past medical history. She has a history of GERD, and coronary artery disease (status post open heart surgery in 2016; denies any current anticoagulation).     We had a discussion today about the likely diagnosis and its natural history, physical exam and imaging findings, as well as various treatment options in detail. Surgically, we have previously discussed a possible right Lisfranc ORIF and a midfoot fusion in the future, depending on her clinical course. We have also previously discussed both surgical and nonsurgical treatment options, however, given her advanced age and relatively low demand lifestyle, we did decide to proceed with conservative management. We have discussed her multiple fractures, and again discussed her mild displacement/subluxation as well as the risks of future pain/arthritis. We discussed the risks of further displacement. At today's visit, we decided to continue with conservative management. Orders/referrals were placed as below at today's visit. She will continue to avoid pain provoking activity, and we have discussed the risks of displacement/reinjury. She may continue to weight-bear as tolerated in the cam boot. She may take the boot off at night to sleep, as well as multiple times throughout the day for range of motion. For DVT prophylaxis, we have discussed that the patient will use the following:  Aspirin 325 mg PO q Day. All questions were answered and the above plan was agreed upon. The patient will return to clinic in 4 weeks with right foot x-rays. At her next visit, I anticipate progressing her activity and weaning her out of the boot.            At the patient's next visit, depending on how the patient is doing and/or new imaging/labs results, we may consider the following options:    []  Lace up ankle     []  CAM boot         []  removable wrist brace     []  PT:        []  Wean out immobilization         []  Adv activity      []  Footmind        []  Spenco       []  Custom Orthotic:               []  AZ brace                    []  Rocker Bottom      []  Night splint    []  Heel cups        []  Strap        []  Toe gizmos    []  Topl        []  NSAIDs         []  Anaid        []  Ref:         []  Stress Xray    []  CT        []  MRI  []  Inj:          []

## 2022-12-09 DIAGNOSIS — S93.324D DISLOCATION OF TARSOMETATARSAL JOINT OF RIGHT FOOT, SUBSEQUENT ENCOUNTER: Primary | ICD-10-CM

## 2022-12-12 ENCOUNTER — OFFICE VISIT (OUTPATIENT)
Dept: ORTHOPEDIC SURGERY | Age: 87
End: 2022-12-12
Payer: MEDICARE

## 2022-12-12 VITALS — WEIGHT: 189 LBS | HEIGHT: 62 IN | BODY MASS INDEX: 34.78 KG/M2 | OXYGEN SATURATION: 100 % | RESPIRATION RATE: 16 BRPM

## 2022-12-12 DIAGNOSIS — S92.301D MULTIPLE CLOSED FRACTURES OF METATARSAL BONE OF RIGHT FOOT WITH ROUTINE HEALING, SUBSEQUENT ENCOUNTER: ICD-10-CM

## 2022-12-12 DIAGNOSIS — S93.324D DISLOCATION OF TARSOMETATARSAL JOINT OF RIGHT FOOT, SUBSEQUENT ENCOUNTER: Primary | ICD-10-CM

## 2022-12-12 PROCEDURE — 1123F ACP DISCUSS/DSCN MKR DOCD: CPT | Performed by: ORTHOPAEDIC SURGERY

## 2022-12-12 PROCEDURE — 99212 OFFICE O/P EST SF 10 MIN: CPT | Performed by: ORTHOPAEDIC SURGERY

## 2022-12-12 NOTE — PROGRESS NOTES
José Luis Andino [facility] 2.  [address] [address]  Dept: [phone]    Ambulatory Orthopedic Consult      CHIEF COMPLAINT:    Chief Complaint   Patient presents with    Foot Pain     Right       HISTORY OF PRESENT ILLNESS:      The patient is a 80 y.o. female who is being seen for evaluation of the above, which began around 9/19/2022 secondary to a trip and fall from standing height  . At today's visit, she is using no brace/assistive device. History is obtained today from:   [x]  the patient     [x]  EMR     []  one family member/friend    []  multiple family members/friends    []  other:      INTERVAL HISTORY 10/17/2022:  She is seen again today in the office for follow up of imaging as below. Since being seen last, the patient is doing better. At today's visit, she is using a brace/boot. History is obtained today from:   [x]  the patient     [x]  EMR     []  one family member/friend    []  multiple family members/friends    []  other:      INTERVAL HISTORY 11/14/2022:  She is seen again today in the office for follow up of a previous issue (as above). Since being seen last, the patient is doing better. At today's visit, she is using a removable brace. History is obtained today from:   [x]  the patient     []  EMR     []  one family member/friend    []  multiple family members/friends    []  other:      INTERVAL HISTORY 12/12/2022:  She is seen again today in the office for follow up of a previous issue (as above). Since being seen last, the patient is doing better. At today's visit, she is using a removable brace.      History is obtained today from:   [x]  the patient     []  EMR     []  one family member/friend    []  multiple family members/friends    []  other:        REVIEW OF SYSTEMS:  Musculoskeletal: See HPI for pertinent positives     Past Medical History:    She  has a past medical history of Acid reflux, HTN (hypertension), and Hyperlipidemia. Past Surgical History:    She  has a past surgical history that includes Mitral valve surgery (03/14/2016); joint replacement (Bilateral); Nasal septum surgery; bladder suspension; Hysterectomy; and Appendectomy. Current Medications:     Current Outpatient Medications:     aspirin 325 MG EC tablet, Take 1 tablet by mouth daily, Disp: 42 tablet, Rfl: 0    losartan (COZAAR) 100 MG tablet, take 1 tablet by mouth once daily, Disp: , Rfl:     metoprolol tartrate (LOPRESSOR) 25 MG tablet, take 1 tablet by mouth twice a day, Disp: 180 tablet, Rfl: 1    atorvastatin (LIPITOR) 20 MG tablet, take 1 tablet by mouth nightly, Disp: 90 tablet, Rfl: 1    omeprazole (PRILOSEC) 20 MG delayed release capsule, take 1 capsule by mouth once daily, Disp: 90 capsule, Rfl: 1    furosemide (LASIX) 20 MG tablet, Take 20 mg by mouth daily, Disp: , Rfl:     aspirin 81 MG EC tablet, Take 81 mg by mouth daily, Disp: , Rfl:     MULTIPLE VITAMIN PO, Take  by mouth daily. , Disp: , Rfl:     calcium carbonate (OSCAL) 500 MG TABS tablet, Take 500 mg by mouth daily. , Disp: , Rfl:      Allergies:    Diclofenac    Family History:  family history includes Other in her father; Stroke in her mother. Social History:   Social History     Occupational History    Not on file   Tobacco Use    Smoking status: Never    Smokeless tobacco: Never   Substance and Sexual Activity    Alcohol use: No    Drug use: No    Sexual activity: Never     Retired    OBJECTIVE:  Resp 16   Ht 5' 2\" (1.575 m)   Wt 189 lb (85.7 kg)   SpO2 100%   BMI 34.57 kg/m²    Psych: awake, alert  Cardio:  well perfused extremities, no cyanosis  Resp:  normal respiratory effort  Musculoskeletal:    Affected lower extremity:    Vascular: Limb well perfused, compartments soft/compressible. Skin: No erythema/ulcers. Intact.    Neurovascular Status:  Grossly neurovascularly intact throughout  Motion:  Grossly able to fire major muscle groups with appropriate/expected AROM  Tenderness to Palpation: None (previously the dorsal midfoot)  -Mild swelling/ecchymosis--improved      RADIOLOGY:   12/12/2022 FINDINGS:  Three weightbearing views (AP, Oblique, Lateral) of the right foot were obtained in the office today and reviewed, revealing fracture of the base of the second metatarsal.  Interval healing noted. Subtle widening of the Lisfranc joint. IMPRESSION: Osseous injury as above. Electronically signed by Edson Diaz MD      MRI images and radiology report reviewed, as below:    1. Acute Lisfranc injury with fractures of the 2nd and 3rd metatarsal bases   and cuneiforms and associated injury of the Lisfranc ligament. There is mild   dorsal subluxation of the 2nd metatarsal base in relation to the intermediate   cuneiform. 2. Diffuse soft tissue edema.      -Independent interpretation: Fractures at the base of the fourth and first metatarsals as well as the second and third as above. FINDINGS:  Three weightbearing views (AP, Mortise, and Lateral) of the right ankle and three weightbearing views (AP, Oblique, Lateral) of the right foot were obtained in the office today and reviewed, revealing fracture of the base of the second metatarsal.     IMPRESSION: Osseous injury as above. Electronically signed by Edson Diaz MD      Relevant previous imaging reviewed, both imaging and report(s) as below:    XR FOOT RIGHT (MIN 3 VIEWS)  Result Date: 10/4/2022  Fracture at the proximal medial aspect 2nd metatarsal.  No additional fracture definitely identified. Consider CT follow-up for better characterization if indicated. ASSESSMENT AND PLAN:  Body mass index is 34.57 kg/m². She has a right Lisfranc injury (fractures at the base of the first through fourth metatarsals and cuneiforms, Lisfranc ligament injury, mild dorsal subluxation of the second TMT joint), sustained around 9/19/2022.   She is continuing to do well overall, but does have subluxation of the second TMT joint, but this appears stable at this time. Notably, she has a somewhat complex past medical history. She has a history of GERD, and coronary artery disease (status post open heart surgery in 2016; denies any current anticoagulation). We had a discussion today about the likely diagnosis and its natural history, physical exam and imaging findings, as well as various treatment options in detail. Surgically, we have previously discussed a possible right Lisfranc ORIF and a midfoot fusion in the future, depending on her clinical course. We have also previously discussed both surgical and nonsurgical treatment options, however, given her advanced age and relatively low demand lifestyle, we did decide to proceed with conservative management. We have discussed her multiple fractures, and again discussed her mild displacement/subluxation as well as the risks of future pain/arthritis. We discussed the risks of further displacement. At today's visit, we decided to continue with conservative management. Orders/referrals were placed as below at today's visit. She may continue to advance her activity as tolerated. She may continue weight bearing as tolerated. The patient may wean out of the cam boot. The patient was referred to physical therapy to help with this transition, and to help progress the patient's activity, along with general strengthening, gait training, and range of motion. Once the patient is completely out of the CAM boot, the patient may stop taking DVT prophylaxis as previously prescribed. All questions were answered and the above plan was agreed upon. The patient will return to clinic in 8 weeks with right foot x-rays. At her next visit, we will monitor for displacement, and likely continue conservative management versus consider a midfoot fusion.              At the patient's next visit, depending on how the patient is doing and/or new imaging/labs results, we may consider the following options:    []  Lace up ankle     []  CAM boot         []  removable wrist brace     []  PT:        []  Wean out immobilization         []  Adv activity      []  Footmind        []  Spenco       []  Custom Orthotic:               []  AZ brace                    []  Rocker Bottom      []  Night splint    []  Heel cups        []  Strap        []  Toe gizmos    []  Topl        []  NSAIDs         []  Anaid        []  Ref:         []  Stress Xray    []  CT        []  MRI  []  Inj:          []  Consider OR      []  Pick OR date    No follow-ups on file. No orders of the defined types were placed in this encounter. No orders of the defined types were placed in this encounter. Trini Kolb MD  Orthopedic Surgery        Please excuse any typos/errors, as this note was created with the assistance of voice recognition software. While intending to generate a document that actually reflects the content of the visit, the document can still have some errors including those of syntax and sound-a-like substitutions which may escape proof reading. In such instances, actual meaning can be extrapolated by context.

## 2023-01-04 ENCOUNTER — HOSPITAL ENCOUNTER (OUTPATIENT)
Dept: PHYSICAL THERAPY | Facility: CLINIC | Age: 88
Setting detail: THERAPIES SERIES
Discharge: HOME | End: 2023-01-04
Payer: MEDICARE

## 2023-01-04 PROCEDURE — 97161 PT EVAL LOW COMPLEX 20 MIN: CPT

## 2023-01-04 PROCEDURE — 97110 THERAPEUTIC EXERCISES: CPT

## 2023-01-04 NOTE — FLOWSHEET NOTE
[facility] Fall Risk Assessment    Patient Name:  Sheyla Champagne  : 1934    Risk Factor Scale  Score   History of Falls [x] Yes  [] No 25  0 25- one fall   Secondary Diagnosis [] Yes  [x] No 15  0 0   Ambulatory Aid [] Furniture  [] Crutches/cane/walker  [x] None/bedrest/wheelchair/nurse 30  15  0 0   IV/Heparin Lock [] Yes  [x] No 20  0 0   Gait/Transferring [] Impaired  [x] Weak  [] Normal/bedrest/immobile 20  10  0 10   Mental Status [] Forgets limitations  [x] Oriented to own ability 15  0 0      Total:     Based on the Assessment score: check the appropriate box.     []  No intervention needed   Low =   Score of 0-24    [x]  Use standard prevention interventions Moderate =  Score of 24-44   [x] Give patient handout and discuss fall prevention strategies   [x] Establish goal of education for patient/family RE: fall prevention strategies    []  Use high risk prevention interventions High = Score of 45 and higher   [] Give patient handout and discuss fall prevention strategies   [] Establish goal of education for patient/family Re: fall prevention strategies   [] Discuss lifeline / other resources    Electronically signed by:   Bertram Hoffman PT  Date: 2023

## 2023-01-04 NOTE — PLAN OF CARE
[] [facility]) - [facility] &  Therapy  [address]  [phone]  F: [phone] [x] [address]  [facility]   [address]  P: [phone]  F: [phone] [] [address] &  Therapy  [address]  P: [phone]  F: [phone] [] [address]  P: [phone]  F: [phone] [] [address]  [facility]   [address]   [place]: [phone]  F: [phone]        Physical Therapy Plan of Care    Date:  2023  Patient: Jami Nicholson  : 1934  MRN: [id number]  Physician: Rose Rey MD                                               Insurance: Darcella Men ([employer], $30 copay)  Medical Diagnosis:   Q84.662Q (ICD-10-CM) - Dislocation of tarsometatarsal joint of right foot, subsequent encounter   S92.301D (ICD-10-CM) - Multiple closed fractures of metatarsal bone of right foot with routine healing, subsequent encounter   Rehab Codes: M25.57, M25.671, M62.81, R26.89   Onset date: 22                           Next 's appt. : 23       Subjective:   CC/HPI: 80 y.o. female presents to physical therapy following a lisfranc fracture sustained after a trip and fall on 22. Pt describes her R shoe catching causing her to fall. Denies any previous falls prior to this incident. Pt waited to see her primary care physician and received XRAYs on  then was referred to . Pt was given a CAM boot and continued to ambulate WBAT in CAM boot without AD. Pt removed CAM boot and began ambulating independently following appointment on . Pt describes ongoing pain on top of her foot but minimal swelling. Pt denies numbness and tingling. Pt denies any radicular symptoms.  Pt has been managing pain with tylenol which helps relieve symptoms. Pt notes that she has been exercising at the [facility] which has helped her foot. Minimal pain with daily activity. Pt denies any feeling of instability at ankle or feeling that it's going to give out. Pt denies any further falls or losses of balance following incident on 9/18/22. Objective:      STRENGTH     Left Right   Hip Flex 4/5 5/5   Ext       ABD 4+/5 4+/5   ADD       Knee Flex 5/5 5/5   Ext 5/5 5/5   Ankle DF 5/5 4/5*   PF 5/5 4/5   INV 5/5 4/5   EVER 5/5 4/5                     ROM  ° A/P     Left Right   Hip Flex       Ext       ER       IR       ABD       ADD       Knee Flex       Ext       Ankle DF 0 Lacking 4 degrees    PF 45 45    INV 35 19*   EVER 14 12*              *indicates pain with movement      Functional Test: FAAM Score: 13/84 or 15.5% functionally impaired      FUNCTIONAL TESTS PAIN NO PAIN COMMENTS   Step Test 4 []  []      6 [x]  []  Reciprocal stepping, excessive R ankle eversion, reduced DF    8 []  []      Squat [x]  []  Dynamic genu valgus, limited ROM      Gait []  [x]  []  Analysis: antalgic gait pattern, decreased R stance time, R medial heel whip with reduced heel-toe progression         5xSTS: 11 seconds       BALANCE/PROPRIOCEPTION              [] Not tested   Single leg stance       R                     L                                PAIN   Eyes open                 5         Sec.        5          Sec                 . [x]    Eyes closed                          Sec. Sec                  . []       Tandem stance EO: 10\" increased ankle stability     Assessment:    80 y.o. female presents to physical therapy after sustaining a lisfranc fracture following a fall on 9/18/22. Pt has been managing fracture conservatively in CAM boot and has been weaning from CAM boot since 12/12/22. Pt arrives ambulating independently in tennis shoe with gait impairments described below.  Pt also presents with reduced R ankle ROM, reduced R ankle strength, and reduced R ankle stability. These impairments are limiting patient's ability to perform prolonged walking, stair climbing, and exercise at the [facility]. Patient would benefit from skilled physical therapy services in order to: reduce ankle pain, restore ankle mobility, and improve ankle strength and stability to further improve tolerance and safety to all daily activities including gait and recreational exercise. Problems:    [x] ? Pain: 1/10 pain R foot  [x] ? ROM reduced R ankle ROM globally  [x] ? Strength impaired R ankle strength globally  [x] ? Function impaired function indicated by FAAM score of 15.5%   [x] Other antalgic gait pattern, decreased R stance time, R medial heel whip with reduced heel-toe progression, impaired balance     LTG: (to be met in 8 treatments)  ? Pain: Decrease R ankle pain levels to 0-1/10 with activity to demonstrate improved tolerance to therapeutic exercise progressions. ? ROM: Increase R ankle DF ROM to 5 degrees to improve heel-toe progression and minimize heel-whip during gait. ? Strength: Increase  R ankle strength to 5/5 globally to ease difficulty with stair climbing and heavier household chores. ? Function: Pt to demonstrate ability to ambulate at least 150' with appropriate gait mechanics and without an increase in R ankle pain. Pt to demonstrate ability to ascend and descend a full flight of stairs with reciprocal stepping mechanics and without pain to ease access to basement for laundry. Pt to demonstrate ability to maintain tandem stance for at least 15\" with improved ankle stability and without pain. Pt to demonstrate improved functional mobility with FAAM score of 10% impaired or less.    Pt to be independent and compliant with Home Exercise Programs  Demonstrate Knowledge of fall prevention MET                     Patient goals: improve function      Rehab Potential:  [x] Good  [] Fair  [] Poor   Suggested Professional Referral:  [x] No [] Yes:  Barriers to Goal Achievement[de-identified]  [x] No  [] Yes:  Domestic Concerns:  [x] No  [] Yes:      Treatment Plan:  [x] Therapeutic Exercise   05374  [] Iontophoresis: 4 mg/mL Dexamethasone Sodium Phosphate  mAmin  83305   [x] Therapeutic Activity  77219 [x] Vasopneumatic cold with compression  99510    [x] Gait Training   81706 [] Ultrasound   96666   [x] Neuromuscular Re-education  08402 [] Electrical Stimulation Unattended  99770   [x] Manual Therapy  16437 [] Electrical Stimulation Attended  36971   [x] Instruction in HEP  [] Lumbar/Cervical Traction  63064   [] Aquatic Therapy   10669 [x] Cold/hotpack    [] Massage   17572      [] Dry Needling, 1 or 2 muscles  40135   [] Biofeedback, first 15 minutes   22228  [] Biofeedback, additional 15 minutes   42164 [] Dry Needling, 3 or more muscles  75718     [x]  Medication allergies reviewed for use of    Dexamethasone Sodium Phosphate 4mg/ml     with iontophoresis treatments. Pt is not allergic. Frequency:  1 x/week for 8 visits    Electronically signed by: Hui Ribera PT        Physician Signature:________________________________Date:__________________  By signing above or cosigning this note, I have reviewed this plan of care and certify a need for medically necessary rehabilitation services.      *PLEASE SIGN ABOVE AND FAX BACK ALL PAGES*

## 2023-01-04 NOTE — CONSULTS
[] [facility]) - [facility] &  Therapy  [address]  [phone]  F: [phone] [x] [address]  [place]   [address]  P: [phone]  F: [phone] [] [address] &  Therapy  [address]  P: [phone]  F: [phone] [] [address]  P: [phone]  F: [phone] [] [address]  [facility]   [address]   [place]: [phone]  F: [phone]    Physical Therapy Lower Extremity Evaluation    Date:  2023  Patient: Joelle Funes  : 1934  MRN: [id number]  Physician: Hansel Moses MD     Insurance: Hien Stone ([employer], $30 copay)  Medical Diagnosis:   F42.606X (ICD-10-CM) - Dislocation of tarsometatarsal joint of right foot, subsequent encounter   S92.301D (ICD-10-CM) - Multiple closed fractures of metatarsal bone of right foot with routine healing, subsequent encounter   Rehab Codes: M25.57, M25.671, M62.81, R26.89   Onset date: 22   Next 's appt. : 23    Subjective:   CC/HPI: 80 y.o. female presents to physical therapy following a lisfranc fracture sustained after a trip and fall on 22. Pt describes her R shoe catching causing her to fall. Denies any previous falls prior to this incident. Pt waited to see her primary care physician and received XRAYs on  then was referred to . Pt was given a CAM boot and continued to ambulate WBAT in CAM boot without AD. Pt removed CAM boot and began ambulating independently following appointment on . Pt describes ongoing pain on top of her foot and swelling. Pt denies numbness and tingling. Pt denies any radicular symptoms. Pt has been managing pain with tylenol which helps relieve symptoms.  Pt notes that she has been exercising at the [facility] which has helped her foot. Minimal pain with daily activity. Pt denies any feeling of instability at ankle or feeling that it's going to give out. Pt denies any further falls or losses of balance following incident on 9/18/22. PMHx: [x] HTN     [x] MI/Heart Problems- mitral valve surgery 2016               [x] Refer to full medical chart  In EPIC     Tests: [x] X-Ray:   fracture of the base of the second metatarsal.  Interval healing noted. Subtle widening of the Lisfranc joint. [x] MRI:   1. Acute Lisfranc injury with fractures of the 2nd and 3rd metatarsal bases   and cuneiforms and associated injury of the Lisfranc ligament. There is mild   dorsal subluxation of the 2nd metatarsal base in relation to the intermediate   cuneiform. 2. Diffuse soft tissue edema.       [] Other:     Comorbidities:   [x] Obesity [] Dialysis  [] N/A   [] Asthma/COPD [] Dementia [] Other:   [] Stroke [] Sleep apnea [] Other:   [] Vascular disease [] Rheumatic disease [] Other:       Medications:  [x] Refer to full medical record [] None [] Other:  Allergies:       [x] Refer to full medical record [] None [] Other:    ADL/IADL Previous level of function Current level of function Who currently assists the patient with task Comments    Bathing  [x] Independent  [] Assist [x] Independent  [] Assist     Dress/grooming [x] Independent  [] Assist [x] Independent  [] Assist     Transfer/mobility [x] Independent  [] Assist [x] Independent  [] Assist     Feeding [x] Independent  [] Assist [x] Independent  [] Assist     Toileting [x] Independent  [] Assist [x] Independent  [] Assist     Driving [x] Independent  [] Assist [] Independent  [x] Assist [employer] [x] Independent  [] Assist [x] Independent  [] Assist     Grocery shop/meal prep [x] Independent  [] Assist [x] Independent  [] Assist       Gait Prior level of function Current level of function    [x] Independent  [] Assist [x] Independent  [] Assist   Device: [x] Independent [x] Independent    [] Straight Cane [] Quad cane [] Straight Cane [] Quad cane    [] Standard walker [] Rolling walker   [] 4 wheeled walker [] Standard walker [] Rolling walker   [] 4 wheeled walker    [] Wheelchair [] Wheelchair     Patient lives with: Bertha Dickinson    In what type of home []  One story   [x] Two story- bedroom and bathroom on main floor    [] Split level   Number of stairs to enter 4   With handrail on the [x]  Right to enter   [] Left to enter   Bathroom has a []  Tub only  [x] Tub/shower combo   [] Walk in shower    [x]  Grab bars   Washing machine is on []  Main level   [] Second level   [x] Basement   Employer Retired    Job Status []  Normal duty   [] Light duty   [] Off due to condition    [x]  Retired   [] Not employed   [] Disability  [] Other:  []  Return to work: Work activities/duties N/A   Recreational Activities  Exercising at [employer]          Pain present? Yes    Location R dorsum of foot    Pain Rating currently 1/10   Pain at worse 1/10   Pain at best 1/10   Description of pain Dull, ache    Altered Sensation None    What makes it worse Standing, walking, heel raise    What makes it better Tylenol    Symptom progression Improving    Sleep Not disturbed              Objective:    STRENGTH    Left Right   Hip Flex 4/5 5/5   Ext     ABD 4+/5 4+/5   ADD     Knee Flex 5/5 5/5   Ext 5/5 5/5   Ankle DF 5/5 4/5*   PF 5/5 4/5   INV 5/5 4/5   EVER 5/5 4/5            ROM  ° A/P    Left Right   Hip Flex     Ext     ER     IR     ABD     ADD     Knee Flex     Ext     Ankle DF 0 Lacking 4 degrees    PF 45 45    INV 35 19*   EVER 14 12*          *indicates pain with movement     TESTS (+/-) Left Right Not Tested   Ant. Drawer   [x]   Post. Drawer   [x]   Lachmans   [x]   Valgus Stress   [x]   Varus Stress   [x]   Mikys   [x]   Apleys Comp.    [x]   Apleys Dist.   [x]   Hip Scouring   [x]   SABINAs   [x]   Piriformis   [x]   Mihirs   [x]   Talor Tilt [x]   Pat-Fem Grind   [x]       OBSERVATION No Deficit Deficit Not Tested Comments   Posture       Iliac Crest [x] [] []    PSIS [x] [] []    ASIS [x] [] []    Genu Valgus [] [x] [] bilateral   Genu Varus [x] [] []    Genu Recurvatum [x] [] []    Pronation [x] [] []    Supination [x] [] []    Leg Length Discrp [x] [] []    Slumped Sitting [] [] []    Palpation [] [x] [] Diffuse tenderness through dorsal base of 2-5th mets, tenderness and increased tone bilateral gastrocs globally    Sensation [] [] []    Edema [x] [] [] Figure 8: 52.5 cm  Figure 8: 52.5 cm   Neurological [x] [] []    Patellar Mobility [x] [] []    Patellar Orientation [x] [] []    Gait [] [x] [] Analysis: antalgic gait pattern, decreased R stance time, R medial heel whip with reduced heel-toe progression         FUNCTION Normal Difficult Unable   Sitting [x] [] []   Standing [] [x] []   Ambulation [] [x] []   Groom/Dress [x] [] []   Lift/Carry [x] [] []   Stairs [] [x] []   Bending [] [x] []   Squat [] [x] []   Kneel [] [x] []         BALANCE/PROPRIOCEPTION              [] Not tested   Single leg stance       R                     L                                PAIN   Eyes open                 5         Sec.        5          Sec                 . [x]    Eyes closed                          Sec. Sec                  . []      Tandem stance EO: 10\" increased ankle stability       FUNCTIONAL TESTS PAIN NO PAIN COMMENTS   Step Test 4 [] []    6 [x] [] Reciprocal stepping, excessive R ankle eversion, reduced DF    8 [] []    Squat [x] [] Dynamic genu valgus, limited ROM           Flexibility Normal Left tight Right tight Measurements   Hip flexor [] [] [] PROM hip extension:    quad [] [] [] Ely's:    HS [] [] [] 90/90:    piriformis [] [] []    ITB [] [] []    gastroc [] [] [] PROM ankle DF:     Soleus  [] [] []     [] [] []     [] [] []             Functional Test: FAAM Score: 13/84 or 15.5% functionally impaired Comments:  5xSTS: 11 seconds         Assessment:    80 y.o. female presents to physical therapy after sustaining a lisfranc fracture following a fall on 9/18/22. Pt has been managing fracture conservatively in CAM boot and has been weaning from CAM boot since 12/12/22. Pt arrives ambulating independently in tennis shoe with gait impairments described below. Pt also presents with reduced R ankle ROM, reduced R ankle strength, and reduced R ankle stability. These impairments are limiting patient's ability to perform prolonged walking, stair climbing, and exercise at the [facility]. Patient would benefit from skilled physical therapy services in order to: reduce ankle pain, restore ankle mobility, and improve ankle strength and stability to further improve tolerance and safety to all daily activities including gait and recreational exercise. Problems:    [x] ? Pain: 1/10 pain R foot  [x] ? ROM reduced R ankle ROM globally  [x] ? Strength impaired R ankle strength globally  [x] ? Function impaired function indicated by FAAM score of 15.5%   [x] Other antalgic gait pattern, decreased R stance time, R medial heel whip with reduced heel-toe progression, impaired balance    LTG: (to be met in 8 treatments)  ? Pain: Decrease R ankle pain levels to 0-1/10 with activity to demonstrate improved tolerance to therapeutic exercise progressions. ? ROM: Increase R ankle DF ROM to 5 degrees to improve heel-toe progression and minimize heel-whip during gait. ? Strength: Increase  R ankle strength to 5/5 globally to ease difficulty with stair climbing and heavier household chores. ? Function: Pt to demonstrate ability to ambulate at least 150' with appropriate gait mechanics and without an increase in R ankle pain. Pt to demonstrate ability to ascend and descend a full flight of stairs with reciprocal stepping mechanics and without pain to ease access to basement for laundry.    Pt to demonstrate ability to maintain tandem stance for at least 15\" with improved ankle stability and without pain. Pt to demonstrate improved functional mobility with FAAM score of 10% impaired or less. Pt to be independent and compliant with Home Exercise Programs  Demonstrate Knowledge of fall prevention MET                    Patient goals: improve function     Rehab Potential:  [x] Good  [] Fair  [] Poor   Suggested Professional Referral:  [x] No  [] Yes:  Barriers to Goal Achievement[de-identified]  [x] No  [] Yes:  Domestic Concerns:  [x] No  [] Yes:    Pt. Education:  [x] Plans/Goals, Risks/Benefits discussed  [x] Home exercise program    Method of Education: [x] Verbal  [x] Demo  [x] Written  Access Code: [facility]  URL: [email or web address]  Date: 01/04/2023  Prepared by: Charmaine Standing    Exercises  Seated Gastroc Stretch with Strap - 1 x daily - 7 x weekly - 3 sets - 30\" hold  Seated Heel Raise - 1 x daily - 7 x weekly - 2 sets - 10 reps  Seated Heel Toe Raises - 1 x daily - 7 x weekly - 2 sets - 10 reps  Seated Ankle Circles - 1 x daily - 7 x weekly - 2 sets - 10 reps  Supine Ankle Pumps - 1 x daily - 7 x weekly - 2 sets - 10 reps  Long Sitting Ankle Plantar Flexion with Resistance - 1 x daily - 7 x weekly - 2 sets - 10 reps  Long Sitting Ankle Dorsiflexion with Anchored Resistance - 1 x daily - 7 x weekly - 2 sets - 10 reps    Comprehension of Education:  [x] Verbalizes understanding. [x] Demonstrates understanding. [x] Needs Review. [] Demonstrates/verbalizes understanding of HEP/Ed previously given.     Treatment Plan:  [x] Therapeutic Exercise   27431  [] Iontophoresis: 4 mg/mL Dexamethasone Sodium Phosphate  mAmin  76835   [x] Therapeutic Activity  61729 [x] Vasopneumatic cold with compression  28478    [x] Gait Training   81174 [] Ultrasound   66136   [x] Neuromuscular Re-education  14817 [] Electrical Stimulation Unattended  04118   [x] Manual Therapy  30167 [] Electrical Stimulation Attended  28769   [x] Instruction in HEP  [] Lumbar/Cervical Traction  [id number]   [] Aquatic Therapy   [id number] [x] Cold/hotpack    [] Massage   [id number]      [] Dry Needling, 1 or 2 muscles  92298   [] Biofeedback, first 15 minutes   00228  [] Biofeedback, additional 15 minutes   58746 [] Dry Needling, 3 or more muscles  07769            []  Medication allergies reviewed for use of    Dexamethasone Sodium Phosphate 4mg/ml     with iontophoresis treatments. Pt is not allergic.     Frequency:  1 x/week for 8 visits    Todays Treatment:    Precautions: WBAT, wean out of CAM boot   Exercises:  Exercise     Reps/ Time Weight/ Level Comments         Seated DF stretch 3x30\" strap    Ankle circles 20x  Clockwise, counter clockwise   Ankle pumps 20x     Resisted ankle DF 20x orange    Resisted ankle PF 20x orange    Seated heel/toe raise 20x                                                     Other:    Specific Instructions for next treatment: restore ankle ROM, progress ankle strengthening and stability per pt tolerance, gait training, stairs, vaso prn    Evaluation Complexity:  History (Personal factors, comorbidities) [] 0 [] 1-2 [x] 3+   Exam (limitations, restrictions) [] 1-2 [] 3 [x] 4+   Clinical presentation (progression) [x] Stable [] Evolving  [] Unstable   Decision Making [x] Low [] Moderate [] High    [x] Low Complexity [] Moderate Complexity [] High Complexity       Treatment Charges: Mins Units   [x] Evaluation       [x]  Low       []  Moderate       []  High 35 1   []  Modalities     [x]  Ther Exercise 10 1   []  Manual Therapy     []  Ther Activities     []  Aquatics     []  Vasocompression     []  Other       TOTAL TREATMENT TIME: 45 minutes   Total estimated medicare costs as of 01/04/23 119.31    Time in:9:05 am    Time Out:9:50 am     Electronically signed by: Jere Tomas, PT        Physician Signature:________________________________Date:__________________  By signing above or cosigning this note, I have reviewed this plan of care and certify a need for medically necessary rehabilitation services.      *PLEASE SIGN ABOVE AND FAX BACK ALL PAGES*

## 2023-01-11 ENCOUNTER — HOSPITAL ENCOUNTER (OUTPATIENT)
Dept: PHYSICAL THERAPY | Facility: CLINIC | Age: 88
Setting detail: THERAPIES SERIES
End: 2023-01-11
Payer: MEDICARE

## 2023-01-11 DIAGNOSIS — I10 ESSENTIAL HYPERTENSION: ICD-10-CM

## 2023-01-18 ENCOUNTER — HOSPITAL ENCOUNTER (OUTPATIENT)
Dept: PHYSICAL THERAPY | Facility: CLINIC | Age: 88
Setting detail: THERAPIES SERIES
Discharge: HOME | End: 2023-01-18
Payer: MEDICARE

## 2023-01-18 PROCEDURE — 97140 MANUAL THERAPY 1/> REGIONS: CPT

## 2023-01-18 PROCEDURE — 97110 THERAPEUTIC EXERCISES: CPT

## 2023-01-18 PROCEDURE — 97112 NEUROMUSCULAR REEDUCATION: CPT

## 2023-01-18 NOTE — FLOWSHEET NOTE
[] [facility]) - [facility] &  Therapy  [address]  [phone]  F: [phone] [x] [address]  [place] [address]   [address]  P: [phone]  F: [phone] [] [address]  [address]  P: [phone]  F: [phone] [] [address]  P: [phone]  F: [phone] [] [address]  [facility]   [address]   [place]: [phone]  F: [phone]      Physical Therapy Daily Treatment Note    Date:  2023  Patient Name:  Epifanio Connors    :  1934  MRN: [id number]  Physician: Carmelina Strauss MD                                               Insurance: Ana Catleslie ([employer], $30 copay)  Medical Diagnosis:   J22.094L (ICD-10-CM) - Dislocation of tarsometatarsal joint of right foot, subsequent encounter   S92.301D (ICD-10-CM) - Multiple closed fractures of metatarsal bone of right foot with routine healing, subsequent encounter   Rehab Codes: M25.57, M25.671, M62.81, R26.89   Onset date: 22                           Next 's appt. : 23  Visit# / total visits: ; Progress note for Medicare patient due at visit 8     Cancels/No Shows: 0/0    Subjective:    Pain:  [x] Yes  [] No Location: R foot Pain Rating: (0-10 scale) 1/10  Pain altered Tx:  [x] No  [] Yes  Action:  Comments: Pt arrives with minimal pain to anterior R foot intermittently. Pt states she feels better than she did during the evaluation and has been completing HEP and exercising at the [facility] without difficulty.        Objective:  Modalities:   Precautions: WBAT, wean out of CAM boot   Exercises:  Exercise  R- closed displaced lisfranc fracture     Reps/ Time Weight/ Level Comments    Nustep  5' L2  warm up         Seated DF stretch 3x30\" strap Ankle circles 20x   Clockwise, counter clockwise   Ankle pumps 20x       Resisted ankle DF 20x lime     Resisted ankle PF 20x lime     Seated heel/toe raise 20x       Seated BAPS 20xea L2               Step ups 10XEA  4\" FWD, LAT   Step downs 10x  4\"     SB stretch 2x30\"                 NBOS EC 2x30\"       NBOS 2x30\" foam Second set eyes closed    NBOS+ ball press and rotation 10xea foam    Marching 2x20\" foam     Modified tandem 2x20\"  Second set EC    SLS 10\"           Gait + head rotation 2L     Other:     Specific Instructions for next treatment: restore ankle ROM, progress ankle strengthening and stability per pt tolerance, gait training, stairs, vaso prn       Treatment Charges: Mins Units   []  Modalities     [x]  Ther Exercise 22 2   []  Manual Therapy     []  Ther Activities     []  Aquatics     []  Vasocompression     [x]  Other neuro 20 1   Total Treatment time 42 3       Assessment: [x] Progressing toward goals. Initiated session with nustep warm up followed by review of HEP. Pt demonstrates good recall of HEP with ability to perform all exercises with minimal cueing for technique. Initiated gait, stair training and balance exercises this date. Pt able to complete all exercises with minimal difficulty and denies increase in pain throughout. Pt with intermittent standing rest breaks due to SOB but otherwise good tolerance. Gait belt donned with SBA during balance for safety however minimal instability present during balance activities this date. Pt will be placed on hold following this session per her request since she is doing well and will call to schedule next appointment as needed. [] No change. [] Other:  [x] Patient would continue to benefit from skilled physical therapy services in order to: reduce ankle pain, restore ankle mobility, and improve ankle strength and stability to further improve tolerance and safety to all daily activities including gait and recreational exercise. STG/LTG  Problems:    [x] ? Pain: 1/10 pain R foot  [x] ? ROM reduced R ankle ROM globally  [x] ? Strength impaired R ankle strength globally  [x] ? Function impaired function indicated by FAAM score of 15.5%   [x] Other antalgic gait pattern, decreased R stance time, R medial heel whip with reduced heel-toe progression, impaired balance     LTG: (to be met in 8 treatments)  ? Pain: Decrease R ankle pain levels to 0-1/10 with activity to demonstrate improved tolerance to therapeutic exercise progressions. ? ROM: Increase R ankle DF ROM to 5 degrees to improve heel-toe progression and minimize heel-whip during gait. ? Strength: Increase  R ankle strength to 5/5 globally to ease difficulty with stair climbing and heavier household chores. ? Function: Pt to demonstrate ability to ambulate at least 150' with appropriate gait mechanics and without an increase in R ankle pain. Pt to demonstrate ability to ascend and descend a full flight of stairs with reciprocal stepping mechanics and without pain to ease access to basement for laundry. Pt to demonstrate ability to maintain tandem stance for at least 15\" with improved ankle stability and without pain. Pt to demonstrate improved functional mobility with FAAM score of 10% impaired or less. Pt to be independent and compliant with Home Exercise Programs  Demonstrate Knowledge of fall prevention MET                     Patient goals: improve function     Pt. Education:  [x] Yes  [] No  [x] Reviewed Prior HEP/Ed  Method of Education: [x] Verbal  [x] Demo  [] Written  Access Code: [facility]  URL: [email or web address]  Date: 01/04/2023  Prepared by: Americo Vila     Exercises  Seated Gastroc Stretch with Strap - 1 x daily - 7 x weekly - 3 sets - 30\" hold  Seated Heel Raise - 1 x daily - 7 x weekly - 2 sets - 10 reps  Seated Heel Toe Raises - 1 x daily - 7 x weekly - 2 sets - 10 reps  Seated Ankle Circles - 1 x daily - 7 x weekly - 2 sets - 10 reps  Supine Ankle Pumps - 1 x daily - 7 x weekly - 2 sets - 10 reps  Long Sitting Ankle Plantar Flexion with Resistance - 1 x daily - 7 x weekly - 2 sets - 10 reps  Long Sitting Ankle Dorsiflexion with Anchored Resistance - 1 x daily - 7 x weekly - 2 sets - 10 reps     Comprehension of Education:  [x] Verbalizes understanding. [x] Demonstrates understanding. [] Needs review. [x] Demonstrates/verbalizes HEP/Ed previously given. Plan: [] Continue current frequency toward long and short term goals.     [x] Specific Instructions for subsequent treatments: pt requests to be on hold from therapy as she is feeling good, will call to schedule next appointment as needed or will be d/c       Time In: 9:00 am            Time Out: 9:47 am    Electronically signed by:  Cornelio Thakur PT

## 2023-02-02 ENCOUNTER — OFFICE VISIT (OUTPATIENT)
Dept: FAMILY MEDICINE CLINIC | Age: 88
End: 2023-02-02
Payer: MEDICARE

## 2023-02-02 ENCOUNTER — HOSPITAL ENCOUNTER (OUTPATIENT)
Age: 88
Setting detail: SPECIMEN
Discharge: HOME | End: 2023-02-02

## 2023-02-02 ENCOUNTER — TELEPHONE (OUTPATIENT)
Dept: FAMILY MEDICINE CLINIC | Age: 88
End: 2023-02-02

## 2023-02-02 VITALS
OXYGEN SATURATION: 98 % | SYSTOLIC BLOOD PRESSURE: 126 MMHG | HEART RATE: 77 BPM | DIASTOLIC BLOOD PRESSURE: 68 MMHG | WEIGHT: 192.6 LBS | HEIGHT: 62 IN | RESPIRATION RATE: 22 BRPM | BODY MASS INDEX: 35.44 KG/M2

## 2023-02-02 DIAGNOSIS — K21.9 GASTROESOPHAGEAL REFLUX DISEASE WITHOUT ESOPHAGITIS: ICD-10-CM

## 2023-02-02 DIAGNOSIS — R06.02 SHORTNESS OF BREATH: ICD-10-CM

## 2023-02-02 DIAGNOSIS — I10 ESSENTIAL HYPERTENSION: ICD-10-CM

## 2023-02-02 DIAGNOSIS — R06.02 SHORTNESS OF BREATH: Primary | ICD-10-CM

## 2023-02-02 DIAGNOSIS — R06.2 WHEEZING: ICD-10-CM

## 2023-02-02 LAB
ABSOLUTE EOS #: 0.17 K/UL (ref 0–0.44)
ABSOLUTE IMMATURE GRANULOCYTE: 0.04 K/UL (ref 0–0.3)
ABSOLUTE LYMPH #: 1.16 K/UL (ref 1.1–3.7)
ABSOLUTE MONO #: 0.66 K/UL (ref 0.1–1.2)
ANION GAP SERPL CALCULATED.3IONS-SCNC: 12 MMOL/L (ref 9–17)
BASOPHILS # BLD: 1 % (ref 0–2)
BASOPHILS ABSOLUTE: 0.05 K/UL (ref 0–0.2)
BNP SERPL-MCNC: 1589 PG/ML
BUN SERPL-MCNC: 18 MG/DL (ref 8–23)
CALCIUM SERPL-MCNC: 9.2 MG/DL (ref 8.6–10.4)
CHLORIDE SERPL-SCNC: 98 MMOL/L (ref 98–107)
CO2 SERPL-SCNC: 27 MMOL/L (ref 20–31)
CREAT SERPL-MCNC: 0.88 MG/DL (ref 0.5–0.9)
EOSINOPHILS RELATIVE PERCENT: 2 % (ref 1–4)
GFR SERPL CREATININE-BSD FRML MDRD: >60 ML/MIN/1.73M2
GLUCOSE SERPL-MCNC: 112 MG/DL (ref 70–99)
HCT VFR BLD AUTO: 37 % (ref 36.3–47.1)
HGB BLD-MCNC: 11.4 G/DL (ref 11.9–15.1)
IMMATURE GRANULOCYTES: 1 %
LYMPHOCYTES # BLD: 14 % (ref 24–43)
MCH RBC QN AUTO: 27.5 PG (ref 25.2–33.5)
MCHC RBC AUTO-ENTMCNC: 30.8 G/DL (ref 28.4–34.8)
MCV RBC AUTO: 89.2 FL (ref 82.6–102.9)
MONOCYTES # BLD: 8 % (ref 3–12)
NRBC AUTOMATED: 0 PER 100 WBC
PDW BLD-RTO: 14.6 % (ref 11.8–14.4)
PLATELET # BLD AUTO: 211 K/UL (ref 138–453)
PMV BLD AUTO: 10.9 FL (ref 8.1–13.5)
POTASSIUM SERPL-SCNC: 4.8 MMOL/L (ref 3.7–5.3)
RBC # BLD: 4.15 M/UL (ref 3.95–5.11)
RBC # BLD: ABNORMAL 10*6/UL
SEG NEUTROPHILS: 74 % (ref 36–65)
SEGMENTED NEUTROPHILS ABSOLUTE COUNT: 5.96 K/UL (ref 1.5–8.1)
SODIUM SERPL-SCNC: 137 MMOL/L (ref 135–144)
WBC # BLD AUTO: 8 K/UL (ref 3.5–11.3)

## 2023-02-02 PROCEDURE — 99214 OFFICE O/P EST MOD 30 MIN: CPT | Performed by: NURSE PRACTITIONER

## 2023-02-02 PROCEDURE — 1123F ACP DISCUSS/DSCN MKR DOCD: CPT | Performed by: NURSE PRACTITIONER

## 2023-02-02 RX ORDER — ALBUTEROL SULFATE 90 UG/1
2 AEROSOL, METERED RESPIRATORY (INHALATION) EVERY 6 HOURS PRN
Qty: 18 G | Refills: 3 | Status: SHIPPED | OUTPATIENT
Start: 2023-02-02

## 2023-02-02 SDOH — ECONOMIC STABILITY: FOOD INSECURITY: WITHIN THE PAST 12 MONTHS, THE FOOD YOU BOUGHT JUST DIDN'T LAST AND YOU DIDN'T HAVE MONEY TO GET MORE.: NEVER TRUE

## 2023-02-02 SDOH — ECONOMIC STABILITY: FOOD INSECURITY: WITHIN THE PAST 12 MONTHS, YOU WORRIED THAT YOUR FOOD WOULD RUN OUT BEFORE YOU GOT MONEY TO BUY MORE.: NEVER TRUE

## 2023-02-02 SDOH — ECONOMIC STABILITY: HOUSING INSECURITY
IN THE LAST 12 MONTHS, WAS THERE A TIME WHEN YOU DID NOT HAVE A STEADY PLACE TO SLEEP OR SLEPT IN A SHELTER (INCLUDING NOW)?: NO

## 2023-02-02 SDOH — ECONOMIC STABILITY: INCOME INSECURITY: HOW HARD IS IT FOR YOU TO PAY FOR THE VERY BASICS LIKE FOOD, HOUSING, MEDICAL CARE, AND HEATING?: NOT VERY HARD

## 2023-02-02 ASSESSMENT — ENCOUNTER SYMPTOMS
WHEEZING: 1
CHEST TIGHTNESS: 0
SHORTNESS OF BREATH: 1
COUGH: 1

## 2023-02-02 ASSESSMENT — PATIENT HEALTH QUESTIONNAIRE - PHQ9
SUM OF ALL RESPONSES TO PHQ QUESTIONS 1-9: 0
SUM OF ALL RESPONSES TO PHQ9 QUESTIONS 1 & 2: 0
1. LITTLE INTEREST OR PLEASURE IN DOING THINGS: 0
SUM OF ALL RESPONSES TO PHQ QUESTIONS 1-9: 0
2. FEELING DOWN, DEPRESSED OR HOPELESS: 0
SUM OF ALL RESPONSES TO PHQ QUESTIONS 1-9: 0
SUM OF ALL RESPONSES TO PHQ QUESTIONS 1-9: 0

## 2023-02-02 NOTE — TELEPHONE ENCOUNTER
----- Message from WEI Naidu NP sent at 2/2/2023  2:39 PM EST -----  Following up  Did she call cardiologist?   I reviewed their last OV note, last 2D echo shows severe pulmonary artery hypertention. May also explain her current symptoms.       Imperative they are aware of new shortness of breath with exertion, ankle swelling, weight gain

## 2023-02-02 NOTE — PROGRESS NOTES
Veronica Prather (:  1934) is a 80 y.o. female,Established patient, here for evaluation of the following chief complaint(s):  Shortness of Breath (For over a month. /States she was not sick when it started. Aline Reji not see cardiologist until April. Lay Dozier open heart surgery 2016. /), Health Maintenance (Depression screen completed. /), and Cough (cough with mucous sometimes. )         ASSESSMENT/PLAN:  1. Shortness of breath  -     Full PFT Study With Bronchodilator; Future  -     XR CHEST (2 VW); Future  -     Brain Natriuretic Peptide; Future  2. Essential hypertension  -     Basic Metabolic Panel; Future  -     CBC with Auto Differential; Future  3. Wheezing  -     albuterol sulfate HFA (PROVENTIL HFA) 108 (90 Base) MCG/ACT inhaler; Inhale 2 puffs into the lungs every 6 hours as needed for Wheezing, Disp-18 g, R-3Normal      Shortness of breath: Rx for chest xr, pft, labs. Imperative she contact cardiologist to review these on going symptoms in addition to weight gain and ankle swelling - verbalized understanding. HTN: Well controlled today. Established with cardiology. Wheezing: Rx for albuterol with instruction for use. Side effect profile reviewed. [facility] office will call to remind her to follow up with cardiology, ER if symptoms suddenly worsen     Return if symptoms worsen or fail to improve.                Subjective   SUBJECTIVE/OBJECTIVE:  71-year-old female with history of coronary artery disease status post CABG x1, prior mitral valve repair and tricuspid valve repair in 2016, hypertension, postop atrial fibrillation and hyperlipidemia presents with daughter for acute visit to discuss dyspnea     Shortness of breath with exertion x 1 month - progressively getting worse  Endorses wheezing with shortness of breath at times   Admits she coughs frequently, sometimes brings up sputum, other times it is a dry cough   Is only able to walk maybe 20-30 feet without stopping to try and catch her breath. If she is seated and not moving around she feels completely normal.   No personal history of smoking, however has been around significant exposure of second hand smoke   Some swelling around her ankles recently. Has noticed weight steadily increasing. Per last cardiology OV  -status post mitral valve ring and tricuspid valve repair 2016. Echo in March, 2021 showed preserved ejection fraction with mild-to-moderate regurgitation with mitral annular ring. Also tricuspid valve repair with moderate to severe pulmonary artery hypertension. She has no symptoms stable status      Review of Systems   Constitutional:  Positive for unexpected weight change. Respiratory:  Positive for cough, shortness of breath and wheezing. Negative for chest tightness. Cardiovascular:  Positive for leg swelling. Negative for chest pain and palpitations. Musculoskeletal:  Positive for gait problem. Objective   Physical Exam  Vitals and nursing note reviewed. Constitutional:       General: She is not in acute distress. Appearance: Normal appearance. She is not ill-appearing or toxic-appearing. HENT:      Head: Normocephalic. Right Ear: Hearing normal.      Left Ear: Hearing normal.      Nose: Nose normal.      Mouth/Throat:      Mouth: Mucous membranes are moist.      Pharynx: Oropharynx is clear. Eyes:      Extraocular Movements: Extraocular movements intact. Conjunctiva/sclera: Conjunctivae normal.      Pupils: Pupils are equal, round, and reactive to light. Cardiovascular:      Rate and Rhythm: Normal rate and regular rhythm. Pulses: Normal pulses. Heart sounds: Normal heart sounds, S1 normal and S2 normal.   Pulmonary:      Effort: Pulmonary effort is normal.      Breath sounds: Normal breath sounds and air entry. No decreased breath sounds, wheezing, rhonchi or rales.       Comments: Fine crackles to to lower left and right lower lung   Abdominal:      General: Abdomen is flat. Bowel sounds are normal.      Palpations: Abdomen is soft. Musculoskeletal:         General: Normal range of motion. Cervical back: Normal range of motion. Right lower le+ Edema present. Left lower le+ Edema present. Comments: Decreased gait speed due to shortness of breath. Skin:     General: Skin is warm and dry. Capillary Refill: Capillary refill takes less than 2 seconds. Neurological:      General: No focal deficit present. Mental Status: She is alert and oriented to person, place, and time. Mental status is at baseline. Psychiatric:         Mood and Affect: Mood normal.         Behavior: Behavior normal.         Thought Content: Thought content normal.         Judgment: Judgment normal.              Wt Readings from Last 3 Encounters:   23 192 lb 9.6 oz (87.4 kg)   22 189 lb (85.7 kg)   22 189 lb (85.7 kg)     Temp Readings from Last 3 Encounters:   22 97.3 °F (36.3 °C) (Temporal)   21 97 °F (36.1 °C) (Temporal)   21 97.2 °F (36.2 °C) (Temporal)     BP Readings from Last 3 Encounters:   23 126/68   22 124/70   22 138/80     Pulse Readings from Last 3 Encounters:   23 77   22 71   22 69           An electronic signature was used to authenticate this note.     --Shona Baker, APRN - NP

## 2023-02-03 ENCOUNTER — HOSPITAL ENCOUNTER (OUTPATIENT)
Dept: PULMONOLOGY | Age: 88
Discharge: HOME | End: 2023-02-03
Payer: MEDICARE

## 2023-02-03 ENCOUNTER — HOSPITAL ENCOUNTER (OUTPATIENT)
Facility: CLINIC | Age: 88
End: 2023-02-03
Payer: MEDICARE

## 2023-02-03 ENCOUNTER — HOSPITAL ENCOUNTER (OUTPATIENT)
Dept: GENERAL RADIOLOGY | Facility: CLINIC | Age: 88
End: 2023-02-03
Payer: MEDICARE

## 2023-02-03 DIAGNOSIS — R06.02 SHORTNESS OF BREATH: ICD-10-CM

## 2023-02-03 LAB
DLCO %PRED: NORMAL
DLCO PRED: NORMAL
DLCO/VA %PRED: NORMAL
DLCO/VA PRED: NORMAL
DLCO/VA: NORMAL
DLCO: NORMAL
EXPIRATORY TIME: NORMAL
FEF 25-75% %PRED-PRE: NORMAL
FEF 25-75% PRED: NORMAL
FEF 25-75%-PRE: NORMAL
FEV1 %PRED-PRE: NORMAL
FEV1 PRED: NORMAL
FEV1/FVC %PRED-PRE: NORMAL
FEV1/FVC PRED: NORMAL
FEV1/FVC: NORMAL
FEV1: NORMAL
FVC %PRED-PRE: NORMAL
FVC PRED: NORMAL
FVC: NORMAL
GAW %PRED: NORMAL
GAW PRED: NORMAL
GAW: NORMAL
IC %PRED: NORMAL
IC PRED: NORMAL
IC: NORMAL
MVV %PRED-PRE: NORMAL
MVV PRED: NORMAL
MVV-PRE: NORMAL
PEF %PRED-PRE: NORMAL
PEF PRED: NORMAL
PEF-PRE: NORMAL
RAW %PRED: NORMAL
RAW PRED: NORMAL
RAW: NORMAL
RV %PRED: NORMAL
RV PRED: NORMAL
RV: NORMAL
SVC %PRED: NORMAL
SVC PRED: NORMAL
SVC: NORMAL
TLC %PRED: NORMAL
TLC PRED: NORMAL
TLC: NORMAL
VA %PRED: NORMAL
VA PRED: NORMAL
VA: NORMAL
VTG %PRED: NORMAL
VTG PRED: NORMAL
VTG: NORMAL

## 2023-02-03 PROCEDURE — 94726 PLETHYSMOGRAPHY LUNG VOLUMES: CPT

## 2023-02-03 PROCEDURE — 94375 RESPIRATORY FLOW VOLUME LOOP: CPT

## 2023-02-03 PROCEDURE — 94664 DEMO&/EVAL PT USE INHALER: CPT

## 2023-02-03 PROCEDURE — 94729 DIFFUSING CAPACITY: CPT

## 2023-02-03 PROCEDURE — 6370000000 HC RX 637 (ALT 250 FOR IP): Performed by: NURSE PRACTITIONER

## 2023-02-03 PROCEDURE — 94060 EVALUATION OF WHEEZING: CPT

## 2023-02-03 PROCEDURE — 71046 X-RAY EXAM CHEST 2 VIEWS: CPT

## 2023-02-03 RX ORDER — OMEPRAZOLE 20 MG/1
CAPSULE, DELAYED RELEASE ORAL
Qty: 90 CAPSULE | Refills: 1 | Status: SHIPPED | OUTPATIENT
Start: 2023-02-03

## 2023-02-03 RX ORDER — ALBUTEROL SULFATE 90 UG/1
2 AEROSOL, METERED RESPIRATORY (INHALATION) ONCE
Status: COMPLETED | OUTPATIENT
Start: 2023-02-03 | End: 2023-02-03

## 2023-02-03 RX ADMIN — ALBUTEROL SULFATE 2 PUFF: 90 AEROSOL, METERED RESPIRATORY (INHALATION) at 16:01

## 2023-02-03 NOTE — PROCEDURES
PFT Interpretation:    Obstructive Ventilatory defect of MODERATE severity is noted. Restrictive ventilatory defect of MILD severity is noted. Evidence of air trapping / hyperinflation is NOT noted. Diffusion Capacity is reduced but is normal when corrected for alveolar volume. NO Significant improvement is noted lung mechanics after bronchodilators.       Tanya Moulton MD

## 2023-02-09 DIAGNOSIS — J44.9 CHRONIC OBSTRUCTIVE PULMONARY DISEASE, UNSPECIFIED COPD TYPE (HCC): Primary | ICD-10-CM

## 2023-02-10 DIAGNOSIS — S93.324D DISLOCATION OF TARSOMETATARSAL JOINT OF RIGHT FOOT, SUBSEQUENT ENCOUNTER: Primary | ICD-10-CM

## 2023-02-13 ENCOUNTER — OFFICE VISIT (OUTPATIENT)
Dept: ORTHOPEDIC SURGERY | Age: 88
End: 2023-02-13

## 2023-02-13 VITALS — HEIGHT: 62 IN | BODY MASS INDEX: 35.33 KG/M2 | WEIGHT: 192 LBS | RESPIRATION RATE: 12 BRPM

## 2023-02-13 DIAGNOSIS — S92.301D MULTIPLE CLOSED FRACTURES OF METATARSAL BONE OF RIGHT FOOT WITH ROUTINE HEALING, SUBSEQUENT ENCOUNTER: ICD-10-CM

## 2023-02-13 DIAGNOSIS — S93.324D DISLOCATION OF TARSOMETATARSAL JOINT OF RIGHT FOOT, SUBSEQUENT ENCOUNTER: Primary | ICD-10-CM

## 2023-02-13 NOTE — PROGRESS NOTES
[address]  [address] [address]  Dept: [phone]    Ambulatory Orthopedic Consult      CHIEF COMPLAINT:    Chief Complaint   Patient presents with    Foot Pain     right       HISTORY OF PRESENT ILLNESS:      The patient is a 80 y.o. female who is being seen for evaluation of the above, which began around 9/19/2022 secondary to a trip and fall from standing height  . At today's visit, she is using no brace/assistive device. History is obtained today from:   [x]  the patient     [x]  EMR     []  one family member/friend    []  multiple family members/friends    []  other:      INTERVAL HISTORY 10/17/2022:  She is seen again today in the office for follow up of imaging as below. Since being seen last, the patient is doing better. At today's visit, she is using a brace/boot. History is obtained today from:   [x]  the patient     [x]  EMR     []  one family member/friend    []  multiple family members/friends    []  other:      INTERVAL HISTORY 11/14/2022:  She is seen again today in the office for follow up of a previous issue (as above). Since being seen last, the patient is doing better. At today's visit, she is using a removable brace. History is obtained today from:   [x]  the patient     []  EMR     []  one family member/friend    []  multiple family members/friends    []  other:      INTERVAL HISTORY 12/12/2022:  She is seen again today in the office for follow up of a previous issue (as above). Since being seen last, the patient is doing better. At today's visit, she is using a removable brace. History is obtained today from:   [x]  the patient     []  EMR     []  one family member/friend    []  multiple family members/friends    []  other:      INTERVAL HISTORY 2/13/2023:  She is seen again today in the office for follow up of a previous issue (as above).  Since being seen last, the patient is doing better. At today's visit, she is not using a brace or assistive device. History is obtained today from:   [x]  the patient     []  EMR     []  one family member/friend    []  multiple family members/friends    []  other:      She reports no pain with ambulation. REVIEW OF SYSTEMS:  Musculoskeletal: See HPI for pertinent positives     Past Medical History:    She  has a past medical history of Acid reflux, Atrial fibrillation ([address] CAD (coronary artery disease), HTN (hypertension), and Hyperlipidemia. Past Surgical History:    She  has a past surgical history that includes Mitral valve surgery (03/14/2016); joint replacement (Bilateral); Nasal septum surgery; bladder suspension; Hysterectomy; and Appendectomy. Current Medications:     Current Outpatient Medications:     tiotropium-olodaterol (STIOLTO RESPIMAT) 2.5-2.5 MCG/ACT AERS, Inhale 2 puffs into the lungs daily, Disp: 1 each, Rfl: 5    omeprazole (PRILOSEC) 20 MG delayed release capsule, take 1 capsule by mouth once daily, Disp: 90 capsule, Rfl: 1    albuterol sulfate HFA (PROVENTIL HFA) 108 (90 Base) MCG/ACT inhaler, Inhale 2 puffs into the lungs every 6 hours as needed for Wheezing, Disp: 18 g, Rfl: 3    aspirin 325 MG EC tablet, Take 1 tablet by mouth daily, Disp: 42 tablet, Rfl: 0    losartan (COZAAR) 100 MG tablet, take 1 tablet by mouth once daily, Disp: , Rfl:     metoprolol tartrate (LOPRESSOR) 25 MG tablet, take 1 tablet by mouth twice a day, Disp: 180 tablet, Rfl: 1    atorvastatin (LIPITOR) 20 MG tablet, take 1 tablet by mouth nightly, Disp: 90 tablet, Rfl: 1    furosemide (LASIX) 20 MG tablet, Take 20 mg by mouth daily, Disp: , Rfl:     aspirin 81 MG EC tablet, Take 81 mg by mouth daily, Disp: , Rfl:     MULTIPLE VITAMIN PO, Take  by mouth daily. , Disp: , Rfl:     calcium carbonate (OSCAL) 500 MG TABS tablet, Take 500 mg by mouth daily. , Disp: , Rfl:      Allergies:    Diclofenac    Family History:  family history includes Other in her father; Stroke in her mother. Social History:   Social History     Occupational History    Not on file   Tobacco Use    Smoking status: Never    Smokeless tobacco: Never   Substance and Sexual Activity    Alcohol use: No    Drug use: No    Sexual activity: Never     Retired    OBJECTIVE:   Resp 12   Ht 5' 2\" (1.575 m)   Wt 192 lb (87.1 kg)   BMI 35.12 kg/m²    Psych: awake, alert  Cardio:  well perfused extremities, no cyanosis  Resp:  normal respiratory effort  Musculoskeletal:    Affected lower extremity:    Vascular: Limb well perfused, compartments soft/compressible. Skin: No erythema/ulcers. Intact. Neurovascular Status:  Grossly neurovascularly intact throughout  Motion:  Grossly able to fire major muscle groups with appropriate/expected AROM  Tenderness to Palpation: None (previously the dorsal midfoot)  -No swelling/ecchymosis      RADIOLOGY:   2/13/2023 FINDINGS:  Three weightbearing views (AP, Oblique, Lateral) of the right foot were obtained in the office today and reviewed, revealing fracture of the base of the second metatarsal.  Interval healing noted. Subtle widening of the Lisfranc joint with questionable interval progression, as well as questionable sag of the arch. IMPRESSION: Osseous injury as above. Electronically signed by Mario Epstein MD      MRI images and radiology report reviewed, as below:    1. Acute Lisfranc injury with fractures of the 2nd and 3rd metatarsal bases   and cuneiforms and associated injury of the Lisfranc ligament. There is mild   dorsal subluxation of the 2nd metatarsal base in relation to the intermediate   cuneiform. 2. Diffuse soft tissue edema.      -Independent interpretation: Fractures at the base of the fourth and first metatarsals as well as the second and third as above.         FINDINGS:  Three weightbearing views (AP, Mortise, and Lateral) of the right ankle and three weightbearing views (AP, Oblique, Lateral) of the right foot were obtained in the office today and reviewed, revealing fracture of the base of the second metatarsal.     IMPRESSION: Osseous injury as above. Electronically signed by Elizabeth Johnson MD      Relevant previous imaging reviewed, both imaging and report(s) as below:    XR FOOT RIGHT (MIN 3 VIEWS)  Result Date: 10/4/2022  Fracture at the proximal medial aspect 2nd metatarsal.  No additional fracture definitely identified. Consider CT follow-up for better characterization if indicated. ASSESSMENT AND PLAN:  Body mass index is 35.12 kg/m². She has a right Lisfranc injury (fractures at the base of the first through fourth metatarsals and cuneiforms, Lisfranc ligament injury, mild dorsal subluxation of the second TMT joint), sustained around 9/19/2022. She is continuing to do well overall, but does have subluxation of the second TMT joint, as well as questionable mild arch collapse. Notably, she has a somewhat complex past medical history. She has a history of GERD, and coronary artery disease (status post open heart surgery in 2016; denies any current anticoagulation). We had a discussion today about the likely diagnosis and its natural history, physical exam and imaging findings, as well as various treatment options in detail. Surgically, we have again discussed a possible right foot midfoot fusion, in the future, depending on her clinical course. Given that it does appear as though she does have some interval widening as well as some mild arch collapse, we did discuss that this may be beneficial depending on how she does radiographically and clinically. For her initial injury, we previously discussed both surgical and nonsurgical treatment options, however, given her advanced age and relatively low demand lifestyle, we did decide to proceed with conservative management.  We have discussed her multiple fractures, and again discussed her mild displacement/subluxation as well as the risks of future pain/arthritis. We discussed the risks of further displacement. At today's visit, we decided to continue with conservative management. Orders/referrals were placed as below at today's visit. She was referred to prosthetics and orthotics to obtain custom orthotic inserts to support her arch, to hopefully help decrease her risk of arch collapse. She may continue activity as tolerated, and avoid pain provoking activity. No immobilization/DVT prophylaxis needed. All questions were answered and the above plan was agreed upon. The patient will return to clinic in 3 months with right foot x-rays. At her next visit, we will monitor for displacement, and likely continue conservative management versus consider a midfoot fusion. At the patient's next visit, depending on how the patient is doing and/or new imaging/labs results, we may consider the following options:    []  Lace up ankle     []  CAM boot         []  removable wrist brace     []  PT:        []  Wean out immobilization         []  Adv activity      []  Footmind        []  Spenco       []  Custom Orthotic:               []  AZ brace                    []  Rocker Bottom      []  Night splint    []  Heel cups        []  Strap        []  Toe gizmos    []  Topl        []  NSAIDs         []  Anaid        []  Ref:         []  Stress Xray    []  CT        []  MRI  []  Inj:          []  Consider OR      []  Pick OR date    No follow-ups on file. No orders of the defined types were placed in this encounter. No orders of the defined types were placed in this encounter. Elder Campos MD  Orthopedic Surgery        Please excuse any typos/errors, as this note was created with the assistance of voice recognition software.  While intending to generate a document that actually reflects the content of the visit, the document can still have some errors including those of syntax and sound-a-like substitutions which may escape proof reading. In such instances, actual meaning can be extrapolated by context.

## 2023-02-14 ENCOUNTER — HOSPITAL ENCOUNTER (OUTPATIENT)
Age: 88
Setting detail: SPECIMEN
Discharge: HOME | End: 2023-02-14

## 2023-02-14 LAB
ANION GAP SERPL CALCULATED.3IONS-SCNC: 11 MMOL/L (ref 9–17)
BUN SERPL-MCNC: 21 MG/DL (ref 8–23)
CALCIUM SERPL-MCNC: 9.2 MG/DL (ref 8.6–10.4)
CHLORIDE SERPL-SCNC: 100 MMOL/L (ref 98–107)
CO2 SERPL-SCNC: 27 MMOL/L (ref 20–31)
CREAT SERPL-MCNC: 1.04 MG/DL (ref 0.5–0.9)
GFR SERPL CREATININE-BSD FRML MDRD: 52 ML/MIN/1.73M2
GLUCOSE SERPL-MCNC: 133 MG/DL (ref 70–99)
MAGNESIUM SERPL-MCNC: 2.1 MG/DL (ref 1.6–2.6)
POTASSIUM SERPL-SCNC: 5 MMOL/L (ref 3.7–5.3)
SODIUM SERPL-SCNC: 138 MMOL/L (ref 135–144)

## 2023-03-09 ENCOUNTER — TELEPHONE (OUTPATIENT)
Dept: FAMILY MEDICINE CLINIC | Age: 88
End: 2023-03-09

## 2023-03-09 DIAGNOSIS — M19.90 OSTEOARTHRITIS, UNSPECIFIED OSTEOARTHRITIS TYPE, UNSPECIFIED SITE: Primary | ICD-10-CM

## 2023-03-21 ENCOUNTER — HOSPITAL ENCOUNTER (OUTPATIENT)
Age: 88
Setting detail: SPECIMEN
Discharge: HOME | End: 2023-03-21

## 2023-03-21 LAB
ANION GAP SERPL CALCULATED.3IONS-SCNC: 12 MMOL/L (ref 9–17)
BUN SERPL-MCNC: 18 MG/DL (ref 8–23)
CALCIUM SERPL-MCNC: 9.5 MG/DL (ref 8.6–10.4)
CHLORIDE SERPL-SCNC: 100 MMOL/L (ref 98–107)
CO2 SERPL-SCNC: 27 MMOL/L (ref 20–31)
CREAT SERPL-MCNC: 1.03 MG/DL (ref 0.5–0.9)
GFR SERPL CREATININE-BSD FRML MDRD: 52 ML/MIN/1.73M2
GLUCOSE SERPL-MCNC: 115 MG/DL (ref 70–99)
MAGNESIUM SERPL-MCNC: 2 MG/DL (ref 1.6–2.6)
POTASSIUM SERPL-SCNC: 4.9 MMOL/L (ref 3.7–5.3)
SODIUM SERPL-SCNC: 139 MMOL/L (ref 135–144)

## 2023-03-28 ENCOUNTER — OFFICE VISIT (OUTPATIENT)
Dept: FAMILY MEDICINE CLINIC | Age: 88
End: 2023-03-28
Payer: MEDICARE

## 2023-03-28 VITALS
OXYGEN SATURATION: 96 % | DIASTOLIC BLOOD PRESSURE: 78 MMHG | BODY MASS INDEX: 34.93 KG/M2 | HEART RATE: 80 BPM | WEIGHT: 189.8 LBS | RESPIRATION RATE: 22 BRPM | SYSTOLIC BLOOD PRESSURE: 132 MMHG | HEIGHT: 62 IN

## 2023-03-28 DIAGNOSIS — E78.5 HYPERLIPIDEMIA, UNSPECIFIED HYPERLIPIDEMIA TYPE: ICD-10-CM

## 2023-03-28 DIAGNOSIS — I97.89 POSTOPERATIVE ATRIAL FIBRILLATION (HCC): ICD-10-CM

## 2023-03-28 DIAGNOSIS — I10 ESSENTIAL HYPERTENSION: ICD-10-CM

## 2023-03-28 DIAGNOSIS — I48.91 POSTOPERATIVE ATRIAL FIBRILLATION (HCC): ICD-10-CM

## 2023-03-28 DIAGNOSIS — D72.829 LEUKOCYTOSIS, UNSPECIFIED TYPE: Primary | ICD-10-CM

## 2023-03-28 PROCEDURE — 99214 OFFICE O/P EST MOD 30 MIN: CPT | Performed by: NURSE PRACTITIONER

## 2023-03-28 PROCEDURE — 1123F ACP DISCUSS/DSCN MKR DOCD: CPT | Performed by: NURSE PRACTITIONER

## 2023-03-28 RX ORDER — ATORVASTATIN CALCIUM 20 MG/1
20 TABLET, FILM COATED ORAL NIGHTLY
Qty: 90 TABLET | Refills: 1 | Status: SHIPPED | OUTPATIENT
Start: 2023-03-28

## 2023-03-28 ASSESSMENT — ENCOUNTER SYMPTOMS
COUGH: 0
SHORTNESS OF BREATH: 0

## 2023-03-28 NOTE — PROGRESS NOTES
Chyna Demarco (:  1934) is a 80 y.o. female,Established patient, here for evaluation of the following chief complaint(s):  Hypertension (6 month follow up)         ASSESSMENT/PLAN:  1. Leukocytosis, unspecified type  -     CBC; Future  2. Hyperlipidemia, unspecified hyperlipidemia type  -     atorvastatin (LIPITOR) 20 MG tablet; Take 1 tablet by mouth nightly, Disp-90 tablet, R-1Normal  3. Essential hypertension  -     metoprolol tartrate (LOPRESSOR) 25 MG tablet; take 1 tablet by mouth twice a day, Disp-180 tablet, R-1Normal  -     Basic Metabolic Panel; Future  4. Postoperative atrial fibrillation (HCC)  Nsr, follows with cardio     No follow-ups on file. Subjective   SUBJECTIVE/OBJECTIVE:  Hypertension  Pertinent negatives include no chest pain, palpitations or shortness of breath. Saw cardio after lab work. Pft did show some moderate obstructive defect. Was started on stiolto. Pt thinks the addition of furosemide helped more with her breathing than the stiolto did. She states this med is costly for her. Cardiology did increase her furosemide to 40 mg based on her bnp. Will try to stop stiolto, has columba at home if needed. Review of Systems   Constitutional:  Negative for chills and fever. Respiratory:  Negative for cough and shortness of breath. Cardiovascular:  Negative for chest pain, palpitations and leg swelling. Objective   Vitals:    23 0823   BP: 132/78   Pulse: 80   Resp: 22   SpO2: 96%     Wt Readings from Last 3 Encounters:   23 189 lb 12.8 oz (86.1 kg)   23 192 lb (87.1 kg)   23 192 lb 9.6 oz (87.4 kg)       Physical Exam  Constitutional:       Appearance: She is well-developed. HENT:      Right Ear: External ear normal.      Left Ear: External ear normal.      Nose: Nose normal.   Cardiovascular:      Rate and Rhythm: Normal rate and regular rhythm.       Heart sounds: Normal heart sounds, S1 normal and S2 normal.   Pulmonary:

## 2023-05-09 ENCOUNTER — HOSPITAL ENCOUNTER (OUTPATIENT)
Age: 88
Setting detail: SPECIMEN
Discharge: HOME | End: 2023-05-09

## 2023-05-09 DIAGNOSIS — D72.829 LEUKOCYTOSIS, UNSPECIFIED TYPE: ICD-10-CM

## 2023-05-09 DIAGNOSIS — I10 ESSENTIAL HYPERTENSION: ICD-10-CM

## 2023-05-09 LAB
ANION GAP SERPL CALCULATED.3IONS-SCNC: 13 MMOL/L (ref 9–17)
BUN SERPL-MCNC: 17 MG/DL (ref 8–23)
CALCIUM SERPL-MCNC: 9.6 MG/DL (ref 8.6–10.4)
CHLORIDE SERPL-SCNC: 98 MMOL/L (ref 98–107)
CO2 SERPL-SCNC: 25 MMOL/L (ref 20–31)
CREAT SERPL-MCNC: 1.12 MG/DL (ref 0.5–0.9)
GFR SERPL CREATININE-BSD FRML MDRD: 47 ML/MIN/1.73M2
GLUCOSE SERPL-MCNC: 110 MG/DL (ref 70–99)
HCT VFR BLD AUTO: 38.4 % (ref 36.3–47.1)
HGB BLD-MCNC: 12.1 G/DL (ref 11.9–15.1)
MCH RBC QN AUTO: 27.3 PG (ref 25.2–33.5)
MCHC RBC AUTO-ENTMCNC: 31.5 G/DL (ref 28.4–34.8)
MCV RBC AUTO: 86.5 FL (ref 82.6–102.9)
NRBC AUTOMATED: 0 PER 100 WBC
PDW BLD-RTO: 14.1 % (ref 11.8–14.4)
PLATELET # BLD AUTO: 205 K/UL (ref 138–453)
PMV BLD AUTO: 11.5 FL (ref 8.1–13.5)
POTASSIUM SERPL-SCNC: 4.4 MMOL/L (ref 3.7–5.3)
RBC # BLD: 4.44 M/UL (ref 3.95–5.11)
SODIUM SERPL-SCNC: 136 MMOL/L (ref 135–144)
WBC # BLD AUTO: 6.2 K/UL (ref 3.5–11.3)

## 2023-05-19 DIAGNOSIS — S93.324D DISLOCATION OF TARSOMETATARSAL JOINT OF RIGHT FOOT, SUBSEQUENT ENCOUNTER: Primary | ICD-10-CM

## 2023-05-22 ENCOUNTER — OFFICE VISIT (OUTPATIENT)
Dept: ORTHOPEDIC SURGERY | Age: 88
End: 2023-05-22
Payer: MEDICARE

## 2023-05-22 VITALS — OXYGEN SATURATION: 100 % | RESPIRATION RATE: 16 BRPM | HEIGHT: 62 IN | WEIGHT: 189 LBS | BODY MASS INDEX: 34.78 KG/M2

## 2023-05-22 DIAGNOSIS — S93.324D DISLOCATION OF TARSOMETATARSAL JOINT OF RIGHT FOOT, SUBSEQUENT ENCOUNTER: Primary | ICD-10-CM

## 2023-05-22 DIAGNOSIS — M19.079 ARTHRITIS OF MIDFOOT: ICD-10-CM

## 2023-05-22 DIAGNOSIS — S92.301D MULTIPLE CLOSED FRACTURES OF METATARSAL BONE OF RIGHT FOOT WITH ROUTINE HEALING, SUBSEQUENT ENCOUNTER: ICD-10-CM

## 2023-05-22 PROCEDURE — 99212 OFFICE O/P EST SF 10 MIN: CPT | Performed by: ORTHOPAEDIC SURGERY

## 2023-05-22 PROCEDURE — 1123F ACP DISCUSS/DSCN MKR DOCD: CPT | Performed by: ORTHOPAEDIC SURGERY

## 2023-05-22 NOTE — PROGRESS NOTES
[address]  [address] [address]  Dept: [phone]    Ambulatory Orthopedic Consult      CHIEF COMPLAINT:    Chief Complaint   Patient presents with    Foot Pain     Right       HISTORY OF PRESENT ILLNESS:      The patient is a 80 y.o. female who is being seen for evaluation of the above, which began around 9/19/2022 secondary to a trip and fall from standing height  . At today's visit, she is using no brace/assistive device. History is obtained today from:   [x]  the patient     [x]  EMR     []  one family member/friend    []  multiple family members/friends    []  other:      INTERVAL HISTORY 10/17/2022:  She is seen again today in the office for follow up of imaging as below. Since being seen last, the patient is doing better. At today's visit, she is using a brace/boot. History is obtained today from:   [x]  the patient     [x]  EMR     []  one family member/friend    []  multiple family members/friends    []  other:      INTERVAL HISTORY 11/14/2022:  She is seen again today in the office for follow up of a previous issue (as above). Since being seen last, the patient is doing better. At today's visit, she is using a removable brace. History is obtained today from:   [x]  the patient     []  EMR     []  one family member/friend    []  multiple family members/friends    []  other:      INTERVAL HISTORY 12/12/2022:  She is seen again today in the office for follow up of a previous issue (as above). Since being seen last, the patient is doing better. At today's visit, she is using a removable brace. History is obtained today from:   [x]  the patient     []  EMR     []  one family member/friend    []  multiple family members/friends    []  other:      INTERVAL HISTORY 2/13/2023:  She is seen again today in the office for follow up of a previous issue (as above).  Since being seen last, the patient is

## 2023-05-25 DIAGNOSIS — R79.89 ELEVATED SERUM CREATININE: Primary | ICD-10-CM

## 2023-07-12 ENCOUNTER — TELEPHONE (OUTPATIENT)
Dept: FAMILY MEDICINE CLINIC | Age: 88
End: 2023-07-12

## 2023-08-01 DIAGNOSIS — K21.9 GASTROESOPHAGEAL REFLUX DISEASE WITHOUT ESOPHAGITIS: ICD-10-CM

## 2023-08-01 NOTE — TELEPHONE ENCOUNTER
LAST VISIT:   3/28/2023     Future Appointments   Date Time Provider 4600  46[facility]   9/29/2023  8:30 AM WEI Bunn - CNP [facility]

## 2023-08-02 RX ORDER — OMEPRAZOLE 20 MG/1
20 CAPSULE, DELAYED RELEASE ORAL DAILY
Qty: 90 CAPSULE | Refills: 1 | Status: SHIPPED | OUTPATIENT
Start: 2023-08-02

## 2023-10-02 ENCOUNTER — OFFICE VISIT (OUTPATIENT)
Dept: FAMILY MEDICINE CLINIC | Age: 88
End: 2023-10-02
Payer: MEDICARE

## 2023-10-02 VITALS
WEIGHT: 181 LBS | BODY MASS INDEX: 33.11 KG/M2 | HEART RATE: 104 BPM | DIASTOLIC BLOOD PRESSURE: 60 MMHG | OXYGEN SATURATION: 96 % | SYSTOLIC BLOOD PRESSURE: 132 MMHG

## 2023-10-02 DIAGNOSIS — I10 ESSENTIAL HYPERTENSION: Primary | ICD-10-CM

## 2023-10-02 DIAGNOSIS — E78.5 HYPERLIPIDEMIA, UNSPECIFIED HYPERLIPIDEMIA TYPE: ICD-10-CM

## 2023-10-02 DIAGNOSIS — Z23 NEED FOR INFLUENZA VACCINATION: ICD-10-CM

## 2023-10-02 PROCEDURE — 1123F ACP DISCUSS/DSCN MKR DOCD: CPT | Performed by: NURSE PRACTITIONER

## 2023-10-02 PROCEDURE — G0008 ADMIN INFLUENZA VIRUS VAC: HCPCS | Performed by: NURSE PRACTITIONER

## 2023-10-02 PROCEDURE — 99214 OFFICE O/P EST MOD 30 MIN: CPT | Performed by: NURSE PRACTITIONER

## 2023-10-02 PROCEDURE — 90694 VACC AIIV4 NO PRSRV 0.5ML IM: CPT | Performed by: NURSE PRACTITIONER

## 2023-10-02 RX ORDER — ATORVASTATIN CALCIUM 20 MG/1
20 TABLET, FILM COATED ORAL NIGHTLY
Qty: 90 TABLET | Refills: 1 | Status: SHIPPED | OUTPATIENT
Start: 2023-10-02

## 2023-10-02 ASSESSMENT — ENCOUNTER SYMPTOMS
SHORTNESS OF BREATH: 0
COUGH: 0

## 2023-10-02 NOTE — PROGRESS NOTES
Harika Sloan (:  1934) is a 80 y.o. female,Established patient, here for evaluation of the following chief complaint(s):  Hypertension and 6 Month Follow-Up         ASSESSMENT/PLAN:  1. Essential hypertension  Stable on beta blocker continue medication    -     metoprolol tartrate (LOPRESSOR) 25 MG tablet; take 1 tablet by mouth twice a day, Disp-180 tablet, R-1Normal  2. Hyperlipidemia, unspecified hyperlipidemia type  Stable on lipitor continue medication    -     atorvastatin (LIPITOR) 20 MG tablet; Take 1 tablet by mouth nightly, Disp-90 tablet, R-1Normal  3. Need for influenza vaccination  -     Influenza, FLUAD, (age 72 y+), IM, Preservative Free, 0.5 mL      No follow-ups on file. Subjective   SUBJECTIVE/OBJECTIVE:  Hypertension  Pertinent negatives include no chest pain, palpitations or shortness of breath. Review of Systems   Constitutional:  Negative for chills and fever. Respiratory:  Negative for cough and shortness of breath. Cardiovascular:  Negative for chest pain, palpitations and leg swelling. Objective   Vitals:    10/02/23 1019   BP: 132/60   Pulse: (!) 104   SpO2: 96%     Physical Exam  Constitutional:       Appearance: She is well-developed. HENT:      Right Ear: External ear normal.      Left Ear: External ear normal.      Nose: Nose normal.   Cardiovascular:      Rate and Rhythm: Normal rate and regular rhythm. Heart sounds: Normal heart sounds, S1 normal and S2 normal.   Pulmonary:      Effort: Pulmonary effort is normal. No respiratory distress. Breath sounds: Normal breath sounds. Musculoskeletal:         General: No deformity. Normal range of motion. Cervical back: Full passive range of motion without pain and normal range of motion. Skin:     General: Skin is warm and dry. Neurological:      Mental Status: She is alert and oriented to person, place, and time.             An electronic signature was used to authenticate this

## 2023-10-02 NOTE — PROGRESS NOTES
Jon Thomas (:  1934) is a 80 y.o. female,Established patient, here for evaluation of the following chief complaint(s):  Hypertension and 6 Month Follow-Up         ASSESSMENT/PLAN:  1. Essential hypertension  The following orders have not been finalized:  -     metoprolol tartrate (LOPRESSOR) 25 MG tablet  2. Hyperlipidemia, unspecified hyperlipidemia type  The following orders have not been finalized:  -     atorvastatin (LIPITOR) 20 MG tablet  3. Need for influenza vaccination      No follow-ups on file. Subjective   SUBJECTIVE/OBJECTIVE:  Hypertension  Pertinent negatives include no chest pain, palpitations or shortness of breath. Review of Systems   Constitutional:  Negative for chills and fever. Respiratory:  Negative for cough and shortness of breath. Cardiovascular:  Negative for chest pain, palpitations and leg swelling. Objective   Vitals:    10/02/23 1019   BP: 132/60   Pulse: (!) 104   SpO2: 96%     Wt Readings from Last 3 Encounters:   10/02/23 181 lb (82.1 kg)   23 189 lb (85.7 kg)   23 189 lb 12.8 oz (86.1 kg)     Physical Exam  Constitutional:       Appearance: She is well-developed. HENT:      Right Ear: External ear normal.      Left Ear: External ear normal.      Nose: Nose normal.   Cardiovascular:      Rate and Rhythm: Normal rate and regular rhythm. Heart sounds: Normal heart sounds, S1 normal and S2 normal.   Pulmonary:      Effort: Pulmonary effort is normal. No respiratory distress. Breath sounds: Normal breath sounds. Musculoskeletal:         General: No deformity. Normal range of motion. Cervical back: Full passive range of motion without pain and normal range of motion. Skin:     General: Skin is warm and dry. Neurological:      Mental Status: She is alert and oriented to person, place, and time. An electronic signature was used to authenticate this note.     --Kaylene Song, WEI - CNP

## 2024-01-17 DIAGNOSIS — K21.9 GASTROESOPHAGEAL REFLUX DISEASE WITHOUT ESOPHAGITIS: ICD-10-CM

## 2024-01-17 NOTE — TELEPHONE ENCOUNTER
Kathryn Gipson is calling to request a refill on the following medication(s):    Last Visit Date (If Applicable):  10/2/2023    Next Visit Date:    4/1/2024    Medication Request:  Requested Prescriptions     Pending Prescriptions Disp Refills    omeprazole (PRILOSEC) 20 MG delayed release capsule [Pharmacy Med Name: OMEPRAZOLE DR 20 MG CAPSULE] 90 capsule 1     Sig: take 1 capsule by mouth once daily

## 2024-01-21 RX ORDER — OMEPRAZOLE 20 MG/1
20 CAPSULE, DELAYED RELEASE ORAL DAILY
Qty: 90 CAPSULE | Refills: 1 | Status: SHIPPED | OUTPATIENT
Start: 2024-01-21

## 2024-01-31 DIAGNOSIS — K21.9 GASTROESOPHAGEAL REFLUX DISEASE WITHOUT ESOPHAGITIS: ICD-10-CM

## 2024-01-31 NOTE — TELEPHONE ENCOUNTER
CHANGED PHARMACY  Last visit:  10/02/23  Last Med refill: 01/24/24  Does patient have enough medication for 72 hours: No: COMPLETELY OUT OF MEDICATION    Next Visit Date:  Future Appointments   Date Time Provider Department Center   4/1/2024  9:45 AM Rosio Eason, APRN - CNP [facility]       Health Maintenance   Topic Date Due    DTaP/Tdap/Td vaccine (1 - Tdap) Never done    Respiratory Syncytial Virus (RSV) Pregnant or age 60 yrs+ (1 - 1-dose 60+ series) Never done    Lipids  06/10/2023    COVID-19 Vaccine (5 - 2023-24 season) 09/01/2023    Annual Wellness Visit (Medicare Advantage)  01/01/2024    Depression Screen  02/02/2024    Flu vaccine  Completed    Shingles vaccine  Completed    Pneumococcal 65+ years Vaccine  Completed    Hepatitis A vaccine  Aged Out    Hepatitis B vaccine  Aged Out    Hib vaccine  Aged Out    Polio vaccine  Aged Out    Meningococcal (ACWY) vaccine  Aged Out       No results found for: \"LABA1C\"          ( goal A1C is < 7)   No components found for: \"LABMICR\"  LDL Cholesterol (mg/dL)   Date Value   06/10/2022 73   05/05/2021 65     LDL Calculated (mg/dL)   Date Value   08/01/2018 64   06/28/2017 68       (goal LDL is <100)   AST (U/L)   Date Value   06/10/2022 39 (H)     ALT (U/L)   Date Value   06/10/2022 20     BUN (mg/dL)   Date Value   05/09/2023 17     BP Readings from Last 3 Encounters:   10/02/23 132/60   03/28/23 132/78   02/02/23 126/68          (goal 120/80)    All Future Testing planned in [facility]  Lab Frequency Next Occurrence   Basic Metabolic Panel Once 08/25/2023               Patient Active Problem List:     OA (osteoarthritis)     HTN (hypertension)     GERD (gastroesophageal reflux disease)     Postoperative atrial fibrillation (HCC)

## 2024-02-01 RX ORDER — OMEPRAZOLE 20 MG/1
20 CAPSULE, DELAYED RELEASE ORAL DAILY
Qty: 90 CAPSULE | Refills: 1 | Status: SHIPPED | OUTPATIENT
Start: 2024-02-01

## 2024-04-01 ENCOUNTER — HOSPITAL ENCOUNTER (OUTPATIENT)
Age: 89
Setting detail: SPECIMEN
Discharge: HOME | End: 2024-04-01

## 2024-04-01 ENCOUNTER — OFFICE VISIT (OUTPATIENT)
Dept: FAMILY MEDICINE CLINIC | Age: 89
End: 2024-04-01
Payer: MEDICARE

## 2024-04-01 VITALS
DIASTOLIC BLOOD PRESSURE: 82 MMHG | SYSTOLIC BLOOD PRESSURE: 130 MMHG | BODY MASS INDEX: 34.55 KG/M2 | HEART RATE: 66 BPM | WEIGHT: 183 LBS | OXYGEN SATURATION: 98 % | HEIGHT: 61 IN

## 2024-04-01 DIAGNOSIS — Z13.29 SCREENING FOR THYROID DISORDER: ICD-10-CM

## 2024-04-01 DIAGNOSIS — Z13.220 SCREENING FOR HYPERLIPIDEMIA: ICD-10-CM

## 2024-04-01 DIAGNOSIS — M54.50 LUMBAR PAIN: ICD-10-CM

## 2024-04-01 DIAGNOSIS — Z13.220 SCREENING FOR HYPERLIPIDEMIA: Primary | ICD-10-CM

## 2024-04-01 DIAGNOSIS — Z00.00 MEDICARE ANNUAL WELLNESS VISIT, SUBSEQUENT: ICD-10-CM

## 2024-04-01 DIAGNOSIS — R79.89 ELEVATED SERUM CREATININE: ICD-10-CM

## 2024-04-01 LAB
ANION GAP SERPL CALCULATED.3IONS-SCNC: 10 MMOL/L (ref 9–16)
BUN SERPL-MCNC: 21 MG/DL (ref 8–23)
CALCIUM SERPL-MCNC: 9.5 MG/DL (ref 8.6–10.4)
CHLORIDE SERPL-SCNC: 102 MMOL/L (ref 98–107)
CHOLEST SERPL-MCNC: 129 MG/DL (ref 0–199)
CHOLESTEROL/HDL RATIO: 3
CO2 SERPL-SCNC: 29 MMOL/L (ref 20–31)
CREAT SERPL-MCNC: 1 MG/DL (ref 0.5–0.9)
GFR SERPL CREATININE-BSD FRML MDRD: 55 ML/MIN/1.73M2
GLUCOSE SERPL-MCNC: 97 MG/DL (ref 74–99)
HDLC SERPL-MCNC: 49 MG/DL
LDLC SERPL CALC-MCNC: 67 MG/DL (ref 0–100)
POTASSIUM SERPL-SCNC: 5.2 MMOL/L (ref 3.7–5.3)
SODIUM SERPL-SCNC: 141 MMOL/L (ref 136–145)
TRIGL SERPL-MCNC: 63 MG/DL
TSH SERPL DL<=0.05 MIU/L-ACNC: 1.93 UIU/ML (ref 0.27–4.2)
VLDLC SERPL CALC-MCNC: 13 MG/DL

## 2024-04-01 PROCEDURE — G0439 PPPS, SUBSEQ VISIT: HCPCS | Performed by: NURSE PRACTITIONER

## 2024-04-01 PROCEDURE — 1123F ACP DISCUSS/DSCN MKR DOCD: CPT | Performed by: NURSE PRACTITIONER

## 2024-04-01 PROCEDURE — 99213 OFFICE O/P EST LOW 20 MIN: CPT | Performed by: NURSE PRACTITIONER

## 2024-04-01 SDOH — ECONOMIC STABILITY: FOOD INSECURITY: WITHIN THE PAST 12 MONTHS, YOU WORRIED THAT YOUR FOOD WOULD RUN OUT BEFORE YOU GOT MONEY TO BUY MORE.: NEVER TRUE

## 2024-04-01 SDOH — ECONOMIC STABILITY: INCOME INSECURITY: HOW HARD IS IT FOR YOU TO PAY FOR THE VERY BASICS LIKE FOOD, HOUSING, MEDICAL CARE, AND HEATING?: NOT HARD AT ALL

## 2024-04-01 SDOH — ECONOMIC STABILITY: FOOD INSECURITY: WITHIN THE PAST 12 MONTHS, THE FOOD YOU BOUGHT JUST DIDN'T LAST AND YOU DIDN'T HAVE MONEY TO GET MORE.: NEVER TRUE

## 2024-04-01 ASSESSMENT — PATIENT HEALTH QUESTIONNAIRE - PHQ9
SUM OF ALL RESPONSES TO PHQ QUESTIONS 1-9: 0
2. FEELING DOWN, DEPRESSED OR HOPELESS: NOT AT ALL
SUM OF ALL RESPONSES TO PHQ QUESTIONS 1-9: 0
1. LITTLE INTEREST OR PLEASURE IN DOING THINGS: NOT AT ALL
SUM OF ALL RESPONSES TO PHQ9 QUESTIONS 1 & 2: 0

## 2024-04-01 ASSESSMENT — LIFESTYLE VARIABLES
HOW MANY STANDARD DRINKS CONTAINING ALCOHOL DO YOU HAVE ON A TYPICAL DAY: PATIENT DOES NOT DRINK
HOW OFTEN DO YOU HAVE A DRINK CONTAINING ALCOHOL: NEVER

## 2024-04-01 NOTE — PATIENT INSTRUCTIONS

## 2024-04-01 NOTE — PROGRESS NOTES
Medicare Annual Wellness Visit    Kathryn Gipson is here for Medicare AW    Assessment & Plan   Screening for hyperlipidemia  -     Lipid Panel; Future  Lumbar pain  -     XR LUMBAR SPINE (MIN 4 VIEWS); Future  Screening for thyroid disorder  -     TSH; Future  Medicare annual wellness visit, subsequent    Recommendations for Preventive Services Due: see orders and patient instructions/AVS.  Recommended screening schedule for the next 5-10 years is provided to the patient in written form: see Patient Instructions/AVS.     back pain.   Going on for a year.  Stretching helps a little. No radiation.  Hasn't taken any meds for this. Walking makes it worse. Pushing on it makes it feel better.      No follow-ups on file.     Subjective       Patient's complete Health Risk Assessment and screening values have been reviewed and are found in Flowsheets. The following problems were reviewed today and where indicated follow up appointments were made and/or referrals ordered.    Positive Risk Factor Screenings with Interventions:                Activity, Diet, and Weight:          Do you eat balanced/healthy meals regularly?: (!) No    Body mass index is 34.58 kg/m². (!) Abnormal    Do you eat balanced/healthy meals regularly Interventions:  Patient declines any further evaluation or treatment  Obesity Interventions:  Pt states [facility] [employer]          Dentist Screen:  Have you seen the dentist within the past year?: (!) No    Intervention:  Advised to schedule with their dentist     Vision Screen:  Do you have difficulty driving, watching TV, or doing any of your daily activities because of your eyesight?: No  Have you had an eye exam within the past year?: (!) No  No results found.    Interventions:   Patient encouraged to make appointment with their eye specialist                    Objective   Vitals:    04/01/24 0959   BP: 130/82   Site: Left Upper Arm   Position: Sitting   Cuff Size: Large

## 2024-04-09 ENCOUNTER — HOSPITAL ENCOUNTER (OUTPATIENT)
Facility: CLINIC | Age: 89
Discharge: HOME | End: 2024-04-11
Payer: MEDICARE

## 2024-04-09 ENCOUNTER — HOSPITAL ENCOUNTER (OUTPATIENT)
Dept: GENERAL RADIOLOGY | Facility: CLINIC | Age: 89
Discharge: HOME | End: 2024-04-11
Payer: MEDICARE

## 2024-04-09 DIAGNOSIS — M54.50 LUMBAR PAIN: ICD-10-CM

## 2024-04-09 PROCEDURE — 72100 X-RAY EXAM L-S SPINE 2/3 VWS: CPT

## 2024-04-10 NOTE — RESULT ENCOUNTER NOTE
Lumber compression fracture, not sure how old this is.  Can give referral to ortho to see if there is anything they can do about this.  She also has some abnormal alignment of her lower spine.

## 2024-04-12 RX ORDER — LOSARTAN POTASSIUM 100 MG/1
100 TABLET ORAL DAILY
Qty: 90 TABLET | Refills: 1 | Status: SHIPPED | OUTPATIENT
Start: 2024-04-12

## 2024-04-30 ENCOUNTER — OFFICE VISIT (OUTPATIENT)
Dept: ORTHOPEDIC SURGERY | Age: 89
End: 2024-04-30
Payer: MEDICARE

## 2024-04-30 VITALS — RESPIRATION RATE: 14 BRPM | HEIGHT: 61 IN | BODY MASS INDEX: 34.55 KG/M2 | WEIGHT: 183 LBS

## 2024-04-30 DIAGNOSIS — M41.50 DEGENERATIVE SCOLIOSIS IN ADULT PATIENT: ICD-10-CM

## 2024-04-30 DIAGNOSIS — M48.062 LUMBAR STENOSIS WITH NEUROGENIC CLAUDICATION: Primary | ICD-10-CM

## 2024-04-30 PROCEDURE — 1123F ACP DISCUSS/DSCN MKR DOCD: CPT | Performed by: ORTHOPAEDIC SURGERY

## 2024-04-30 PROCEDURE — 99213 OFFICE O/P EST LOW 20 MIN: CPT | Performed by: ORTHOPAEDIC SURGERY

## 2024-04-30 NOTE — PROGRESS NOTES
Patient ID: Kathryn Gipson is a 89 y.o. female.    Chief Complaint   Patient presents with    Lower Back Pain        HPI    Patient with slowly progressive chronic low back pain predominantly axial midline with diffuse radiation into the buttocks.  Slowly progressive over the last decade.    Patient is active with a home/[employer] exercise and stretching program does chair yoga.    Patient reports significant progression over the last year.    Patient with symptoms of can only stand for short period of time before she has to sit gets relief with sitting difficulty walking longer distances definitely tries to push a grocery cart    Past Medical History:   Diagnosis Date    Acid reflux     Atrial fibrillation (HCC)     CAD (coronary artery disease)     HTN (hypertension)     Hyperlipidemia      Past Surgical History:   Procedure Laterality Date    APPENDECTOMY      BLADDER SUSPENSION      HYSTERECTOMY (CERVIX STATUS UNKNOWN)      JOINT REPLACEMENT Bilateral     MITRAL VALVE SURGERY  03/14/2016    TT/Dr. Clarke    NASAL SEPTUM SURGERY       Family History   Problem Relation Age of Onset    Stroke Mother     Other Father         cerebral hemorrhage     Social History     Occupational History    Not on file   Tobacco Use    Smoking status: Never    Smokeless tobacco: Never   Substance and Sexual Activity    Alcohol use: No    Drug use: No    Sexual activity: Never        Review of Systems         Physical Exam  Vitals and nursing note reviewed.   Constitutional:       Appearance: She is well-developed.   HENT:      Head: Normocephalic and atraumatic.      Nose: Nose normal.   Eyes:      Conjunctiva/sclera: Conjunctivae normal.   Pulmonary:      Effort: Pulmonary effort is normal. No respiratory distress.   Musculoskeletal:      Cervical back: Normal range of motion and neck supple.      Comments: Normal gait     Skin:     General: Skin is warm and dry.   Neurological:      Mental Status: She is alert and oriented

## 2024-05-15 ENCOUNTER — HOSPITAL ENCOUNTER (OUTPATIENT)
Dept: MRI IMAGING | Age: 89
Discharge: HOME | End: 2024-05-17
Attending: ORTHOPAEDIC SURGERY
Payer: MEDICARE

## 2024-05-15 DIAGNOSIS — M48.062 LUMBAR STENOSIS WITH NEUROGENIC CLAUDICATION: ICD-10-CM

## 2024-05-15 DIAGNOSIS — M41.50 DEGENERATIVE SCOLIOSIS IN ADULT PATIENT: ICD-10-CM

## 2024-05-15 PROCEDURE — 72148 MRI LUMBAR SPINE W/O DYE: CPT

## 2024-05-28 ENCOUNTER — OFFICE VISIT (OUTPATIENT)
Dept: ORTHOPEDIC SURGERY | Age: 89
End: 2024-05-28
Payer: MEDICARE

## 2024-05-28 VITALS — HEIGHT: 61 IN | BODY MASS INDEX: 33.99 KG/M2 | WEIGHT: 180 LBS | RESPIRATION RATE: 14 BRPM

## 2024-05-28 DIAGNOSIS — M41.50 DEGENERATIVE SCOLIOSIS IN ADULT PATIENT: Primary | ICD-10-CM

## 2024-05-28 DIAGNOSIS — M48.062 LUMBAR STENOSIS WITH NEUROGENIC CLAUDICATION: ICD-10-CM

## 2024-05-28 PROCEDURE — 1123F ACP DISCUSS/DSCN MKR DOCD: CPT | Performed by: ORTHOPAEDIC SURGERY

## 2024-05-28 PROCEDURE — 99213 OFFICE O/P EST LOW 20 MIN: CPT | Performed by: ORTHOPAEDIC SURGERY

## 2024-05-28 NOTE — PROGRESS NOTES
Diclofenac Shortness Of Breath     Social History     Socioeconomic History    Marital status:      Spouse name: Not on file    Number of children: Not on file    Years of education: Not on file    Highest education level: Not on file   Occupational History    Not on file   Tobacco Use    Smoking status: Never    Smokeless tobacco: Never   Substance and Sexual Activity    Alcohol use: No    Drug use: No    Sexual activity: Never   Other Topics Concern    Not on file   Social History Narrative    Not on file     Social Determinants of Health     Financial Resource Strain: Low Risk  (4/1/2024)    Overall Financial Resource Strain (CARDIA)     Difficulty of Paying Living Expenses: Not hard at all   Food Insecurity: No Food Insecurity (4/1/2024)    Hunger Vital Sign     Worried About Running Out of Food in the Last Year: Never true     Ran Out of Food in the Last Year: Never true   Transportation Needs: Unknown (4/1/2024)    PRAPARE - Transportation     Lack of Transportation (Medical): Not on file     Lack of Transportation (Non-Medical): No   Physical Activity: Sufficiently Active (6/8/2022)    Exercise Vital Sign     Days of Exercise per Week: 4 days     Minutes of Exercise per Session: 60 min   Stress: Not on file   Social Connections: Not on file   Intimate Partner Violence: Not on file   Housing Stability: Unknown (4/1/2024)    Housing Stability Vital Sign     Unable to Pay for Housing in the Last Year: Not on file     Number of Places Lived in the Last Year: Not on file     Unstable Housing in the Last Year: No     Past Medical History:   Diagnosis Date    Acid reflux     Atrial fibrillation (HCC)     CAD (coronary artery disease)     HTN (hypertension)     Hyperlipidemia      Past Surgical History:   Procedure Laterality Date    APPENDECTOMY      BLADDER SUSPENSION      HYSTERECTOMY (CERVIX STATUS UNKNOWN)      JOINT REPLACEMENT Bilateral     MITRAL VALVE SURGERY  03/14/2016    TT/Dr. Clarke    NASAL

## 2024-05-30 DIAGNOSIS — J44.9 CHRONIC OBSTRUCTIVE PULMONARY DISEASE, UNSPECIFIED COPD TYPE (HCC): ICD-10-CM

## 2024-05-30 NOTE — TELEPHONE ENCOUNTER
Last visit: 04/01/24  Last Med refill:   Does patient have enough medication for 72 hours: No: COMPLETELY OUT  She states the albuterol is not really working.     Next Visit Date:  Future Appointments   Date Time Provider Department Center   6/13/2024 11:00 AM Moy Kessler MD [facility]   8/6/2024  2:10 PM Eliot Turner MD [facility]   4/4/2025  9:45 AM Rosio Eason, APRN - CNP [facility]       Health Maintenance   Topic Date Due    DTaP/Tdap/Td vaccine (1 - Tdap) Never done    Respiratory Syncytial Virus (RSV) Pregnant or age 60 yrs+ (1 - 1-dose 60+ series) Never done    COVID-19 Vaccine (5 - 2023-24 season) 09/01/2023    Lipids  04/01/2025    Depression Screen  04/01/2025    Annual Wellness Visit (Medicare Advantage)  Completed    Flu vaccine  Completed    Shingles vaccine  Completed    Pneumococcal 65+ years Vaccine  Completed    Hepatitis A vaccine  Aged Out    Hepatitis B vaccine  Aged Out    Hib vaccine  Aged Out    Polio vaccine  Aged Out    Meningococcal (ACWY) vaccine  Aged Out       No results found for: \"LABA1C\"          ( goal A1C is < 7)   No components found for: \"LABMICR\"  No components found for: \"LDLCHOLESTEROL\", \"LDLCALC\"    (goal LDL is <100)   AST (U/L)   Date Value   06/10/2022 39 (H)     ALT (U/L)   Date Value   06/10/2022 20     BUN (mg/dL)   Date Value   04/01/2024 21     BP Readings from Last 3 Encounters:   04/01/24 130/82   10/02/23 132/60   03/28/23 132/78          (goal 120/80)    All Future Testing planned in [facility]  Lab Frequency Next Occurrence               Patient Active Problem List:     OA (osteoarthritis)     HTN (hypertension)     GERD (gastroesophageal reflux disease)     Postoperative atrial fibrillation (HCC)

## 2024-06-02 ENCOUNTER — APPOINTMENT (OUTPATIENT)
Dept: CT IMAGING | Age: 89
End: 2024-06-02
Payer: MEDICARE

## 2024-06-02 ENCOUNTER — HOSPITAL ENCOUNTER (INPATIENT)
Age: 89
LOS: 2 days | Discharge: HOME | DRG: 164 | End: 2024-06-04
Attending: EMERGENCY MEDICINE | Admitting: SURGERY
Payer: MEDICARE

## 2024-06-02 ENCOUNTER — HOSPITAL ENCOUNTER (EMERGENCY)
Age: 89
Discharge: ANOTHER HOSPITAL | End: 2024-06-02
Attending: EMERGENCY MEDICINE
Payer: MEDICARE

## 2024-06-02 ENCOUNTER — APPOINTMENT (OUTPATIENT)
Dept: GENERAL RADIOLOGY | Age: 89
End: 2024-06-02
Payer: MEDICARE

## 2024-06-02 VITALS
SYSTOLIC BLOOD PRESSURE: 155 MMHG | OXYGEN SATURATION: 96 % | WEIGHT: 180 LBS | HEIGHT: 62 IN | DIASTOLIC BLOOD PRESSURE: 89 MMHG | BODY MASS INDEX: 33.13 KG/M2 | TEMPERATURE: 98.6 F | RESPIRATION RATE: 22 BRPM | HEART RATE: 95 BPM

## 2024-06-02 DIAGNOSIS — I26.94 MULTIPLE SUBSEGMENTAL PULMONARY EMBOLI WITHOUT ACUTE COR PULMONALE (HCC): Primary | ICD-10-CM

## 2024-06-02 DIAGNOSIS — I26.99 PULMONARY EMBOLISM, BILATERAL (HCC): ICD-10-CM

## 2024-06-02 DIAGNOSIS — I48.91 ATRIAL FIBRILLATION, UNSPECIFIED TYPE (HCC): ICD-10-CM

## 2024-06-02 DIAGNOSIS — I26.09 ACUTE PULMONARY EMBOLISM WITH ACUTE COR PULMONALE, UNSPECIFIED PULMONARY EMBOLISM TYPE (HCC): Primary | ICD-10-CM

## 2024-06-02 DIAGNOSIS — I26.99 PULMONARY EMBOLUS (HCC): ICD-10-CM

## 2024-06-02 LAB
ALBUMIN SERPL-MCNC: 3.9 G/DL (ref 3.5–5.2)
ALP SERPL-CCNC: 82 U/L (ref 35–104)
ALT SERPL-CCNC: 15 U/L (ref 5–33)
ANION GAP SERPL CALCULATED.3IONS-SCNC: 15 MMOL/L (ref 9–17)
ANTI-XA UNFRAC HEPARIN: 1.66 IU/L
ANTI-XA UNFRAC HEPARIN: <0.1 IU/L (ref 0.3–0.7)
AST SERPL-CCNC: 26 U/L
BASOPHILS # BLD: 0.03 K/UL (ref 0–0.2)
BASOPHILS NFR BLD: 0 % (ref 0–2)
BILIRUB SERPL-MCNC: 1 MG/DL (ref 0.3–1.2)
BNP SERPL-MCNC: ABNORMAL PG/ML
BUN SERPL-MCNC: 22 MG/DL (ref 8–23)
BUN/CREAT SERPL: 18 (ref 9–20)
CALCIUM SERPL-MCNC: 9.2 MG/DL (ref 8.6–10.4)
CHLORIDE SERPL-SCNC: 96 MMOL/L (ref 98–107)
CO2 SERPL-SCNC: 22 MMOL/L (ref 20–31)
CREAT SERPL-MCNC: 1.2 MG/DL (ref 0.5–0.9)
EOSINOPHIL # BLD: <0.03 K/UL (ref 0–0.44)
EOSINOPHILS RELATIVE PERCENT: 0 % (ref 1–4)
ERYTHROCYTE [DISTWIDTH] IN BLOOD BY AUTOMATED COUNT: 14.6 % (ref 11.8–14.4)
FLUAV RNA RESP QL NAA+PROBE: NOT DETECTED
FLUBV RNA RESP QL NAA+PROBE: NOT DETECTED
GFR, ESTIMATED: 43 ML/MIN/1.73M2
GLUCOSE SERPL-MCNC: 160 MG/DL (ref 70–99)
HCT VFR BLD AUTO: 38.3 % (ref 36.3–47.1)
HGB BLD-MCNC: 12.5 G/DL (ref 11.9–15.1)
IMM GRANULOCYTES # BLD AUTO: 0.09 K/UL (ref 0–0.3)
IMM GRANULOCYTES NFR BLD: 1 %
INR PPP: 1.2
INR PPP: 1.5
LACTATE BLDV-SCNC: 2 MMOL/L (ref 0.5–2.2)
LYMPHOCYTES NFR BLD: 1.47 K/UL (ref 1.1–3.7)
LYMPHOCYTES RELATIVE PERCENT: 10 % (ref 24–43)
MAGNESIUM SERPL-MCNC: 2 MG/DL (ref 1.6–2.6)
MCH RBC QN AUTO: 27.5 PG (ref 25.2–33.5)
MCHC RBC AUTO-ENTMCNC: 32.6 G/DL (ref 28.4–34.8)
MCV RBC AUTO: 84.2 FL (ref 82.6–102.9)
MONOCYTES NFR BLD: 1.2 K/UL (ref 0.1–1.2)
MONOCYTES NFR BLD: 8 % (ref 3–12)
NEUTROPHILS NFR BLD: 81 % (ref 36–65)
NEUTS SEG NFR BLD: 12.31 K/UL (ref 1.5–8.1)
NRBC BLD-RTO: 0 PER 100 WBC
PARTIAL THROMBOPLASTIN TIME: 34.3 SEC (ref 23.9–33.8)
PARTIAL THROMBOPLASTIN TIME: >180 SEC (ref 23–36.5)
PLATELET # BLD AUTO: 191 K/UL (ref 138–453)
PMV BLD AUTO: 10.3 FL (ref 8.1–13.5)
POTASSIUM SERPL-SCNC: 4.3 MMOL/L (ref 3.7–5.3)
PROT SERPL-MCNC: 7.2 G/DL (ref 6.4–8.3)
PROTHROMBIN TIME: 15.5 SEC (ref 11.5–14.2)
PROTHROMBIN TIME: 17.8 SEC (ref 11.7–14.9)
RBC # BLD AUTO: 4.55 M/UL (ref 3.95–5.11)
RBC # BLD: ABNORMAL 10*6/UL
SARS-COV-2 RNA RESP QL NAA+PROBE: NOT DETECTED
SODIUM SERPL-SCNC: 133 MMOL/L (ref 135–144)
SOURCE: NORMAL
SPECIMEN DESCRIPTION: NORMAL
T4 FREE SERPL-MCNC: 1.4 NG/DL (ref 0.9–1.7)
TROPONIN I SERPL HS-MCNC: 88 NG/L (ref 0–14)
TROPONIN I SERPL HS-MCNC: 90 NG/L (ref 0–14)
TSH SERPL DL<=0.05 MIU/L-ACNC: 2.84 UIU/ML (ref 0.3–5)
WBC OTHER # BLD: 15.1 K/UL (ref 3.5–11.3)

## 2024-06-02 PROCEDURE — 80053 COMPREHEN METABOLIC PANEL: CPT

## 2024-06-02 PROCEDURE — 85610 PROTHROMBIN TIME: CPT

## 2024-06-02 PROCEDURE — 6360000002 HC RX W HCPCS: Performed by: NURSE PRACTITIONER

## 2024-06-02 PROCEDURE — 96375 TX/PRO/DX INJ NEW DRUG ADDON: CPT

## 2024-06-02 PROCEDURE — 85025 COMPLETE CBC W/AUTO DIFF WBC: CPT

## 2024-06-02 PROCEDURE — 99285 EMERGENCY DEPT VISIT HI MDM: CPT

## 2024-06-02 PROCEDURE — 94761 N-INVAS EAR/PLS OXIMETRY MLT: CPT

## 2024-06-02 PROCEDURE — 96374 THER/PROPH/DIAG INJ IV PUSH: CPT

## 2024-06-02 PROCEDURE — 71045 X-RAY EXAM CHEST 1 VIEW: CPT

## 2024-06-02 PROCEDURE — 84484 ASSAY OF TROPONIN QUANT: CPT

## 2024-06-02 PROCEDURE — 84439 ASSAY OF FREE THYROXINE: CPT

## 2024-06-02 PROCEDURE — 83605 ASSAY OF LACTIC ACID: CPT

## 2024-06-02 PROCEDURE — 87636 SARSCOV2 & INF A&B AMP PRB: CPT

## 2024-06-02 PROCEDURE — 85730 THROMBOPLASTIN TIME PARTIAL: CPT

## 2024-06-02 PROCEDURE — 2580000003 HC RX 258: Performed by: NURSE PRACTITIONER

## 2024-06-02 PROCEDURE — 96365 THER/PROPH/DIAG IV INF INIT: CPT

## 2024-06-02 PROCEDURE — 85520 HEPARIN ASSAY: CPT

## 2024-06-02 PROCEDURE — 6360000004 HC RX CONTRAST MEDICATION: Performed by: NURSE PRACTITIONER

## 2024-06-02 PROCEDURE — 83735 ASSAY OF MAGNESIUM: CPT

## 2024-06-02 PROCEDURE — 2700000000 HC OXYGEN THERAPY PER DAY

## 2024-06-02 PROCEDURE — 93005 ELECTROCARDIOGRAM TRACING: CPT | Performed by: SURGERY

## 2024-06-02 PROCEDURE — 94640 AIRWAY INHALATION TREATMENT: CPT

## 2024-06-02 PROCEDURE — 96366 THER/PROPH/DIAG IV INF ADDON: CPT

## 2024-06-02 PROCEDURE — 6360000002 HC RX W HCPCS: Performed by: STUDENT IN AN ORGANIZED HEALTH CARE EDUCATION/TRAINING PROGRAM

## 2024-06-02 PROCEDURE — 71260 CT THORAX DX C+: CPT

## 2024-06-02 PROCEDURE — 2000000000 HC ICU R&B

## 2024-06-02 PROCEDURE — 71250 CT THORAX DX C-: CPT

## 2024-06-02 PROCEDURE — 93005 ELECTROCARDIOGRAM TRACING: CPT | Performed by: NURSE PRACTITIONER

## 2024-06-02 PROCEDURE — 84443 ASSAY THYROID STIM HORMONE: CPT

## 2024-06-02 PROCEDURE — 83880 ASSAY OF NATRIURETIC PEPTIDE: CPT

## 2024-06-02 PROCEDURE — 96376 TX/PRO/DX INJ SAME DRUG ADON: CPT

## 2024-06-02 RX ORDER — HEPARIN SODIUM 1000 [USP'U]/ML
80 INJECTION, SOLUTION INTRAVENOUS; SUBCUTANEOUS PRN
Status: DISCONTINUED | OUTPATIENT
Start: 2024-06-02 | End: 2024-06-02 | Stop reason: HOSPADM

## 2024-06-02 RX ORDER — HEPARIN SODIUM 1000 [USP'U]/ML
40 INJECTION, SOLUTION INTRAVENOUS; SUBCUTANEOUS PRN
Status: DISCONTINUED | OUTPATIENT
Start: 2024-06-02 | End: 2024-06-04 | Stop reason: HOSPADM

## 2024-06-02 RX ORDER — HEPARIN SODIUM 1000 [USP'U]/ML
40 INJECTION, SOLUTION INTRAVENOUS; SUBCUTANEOUS PRN
Status: DISCONTINUED | OUTPATIENT
Start: 2024-06-02 | End: 2024-06-02 | Stop reason: HOSPADM

## 2024-06-02 RX ORDER — 0.9 % SODIUM CHLORIDE 0.9 %
80 INTRAVENOUS SOLUTION INTRAVENOUS ONCE
Status: COMPLETED | OUTPATIENT
Start: 2024-06-02 | End: 2024-06-02

## 2024-06-02 RX ORDER — ALBUTEROL SULFATE 2.5 MG/3ML
2.5 SOLUTION RESPIRATORY (INHALATION) ONCE
Status: COMPLETED | OUTPATIENT
Start: 2024-06-02 | End: 2024-06-02

## 2024-06-02 RX ORDER — ASPIRIN 81 MG/1
324 TABLET, CHEWABLE ORAL ONCE
Status: DISCONTINUED | OUTPATIENT
Start: 2024-06-02 | End: 2024-06-02

## 2024-06-02 RX ORDER — SODIUM CHLORIDE 0.9 % (FLUSH) 0.9 %
10 SYRINGE (ML) INJECTION ONCE
Status: COMPLETED | OUTPATIENT
Start: 2024-06-02 | End: 2024-06-02

## 2024-06-02 RX ORDER — HEPARIN SODIUM 1000 [USP'U]/ML
80 INJECTION, SOLUTION INTRAVENOUS; SUBCUTANEOUS PRN
Status: DISCONTINUED | OUTPATIENT
Start: 2024-06-02 | End: 2024-06-04 | Stop reason: HOSPADM

## 2024-06-02 RX ORDER — HEPARIN SODIUM 10000 [USP'U]/100ML
5-30 INJECTION, SOLUTION INTRAVENOUS CONTINUOUS
Status: DISCONTINUED | OUTPATIENT
Start: 2024-06-02 | End: 2024-06-02 | Stop reason: HOSPADM

## 2024-06-02 RX ORDER — HEPARIN SODIUM 1000 [USP'U]/ML
80 INJECTION, SOLUTION INTRAVENOUS; SUBCUTANEOUS ONCE
Status: COMPLETED | OUTPATIENT
Start: 2024-06-02 | End: 2024-06-02

## 2024-06-02 RX ORDER — HEPARIN SODIUM 10000 [USP'U]/100ML
5-30 INJECTION, SOLUTION INTRAVENOUS CONTINUOUS
Status: DISCONTINUED | OUTPATIENT
Start: 2024-06-02 | End: 2024-06-03

## 2024-06-02 RX ADMIN — HEPARIN SODIUM AND DEXTROSE 18 UNITS/KG/HR: 10000; 5 INJECTION INTRAVENOUS at 19:08

## 2024-06-02 RX ADMIN — SODIUM CHLORIDE, PRESERVATIVE FREE 10 ML: 5 INJECTION INTRAVENOUS at 17:52

## 2024-06-02 RX ADMIN — ALBUTEROL SULFATE 2.5 MG: 2.5 SOLUTION RESPIRATORY (INHALATION) at 14:54

## 2024-06-02 RX ADMIN — IOPAMIDOL 75 ML: 755 INJECTION, SOLUTION INTRAVENOUS at 17:52

## 2024-06-02 RX ADMIN — SODIUM CHLORIDE 80 ML: 9 INJECTION, SOLUTION INTRAVENOUS at 17:52

## 2024-06-02 RX ADMIN — WATER 125 MG: 1 INJECTION INTRAMUSCULAR; INTRAVENOUS; SUBCUTANEOUS at 15:04

## 2024-06-02 RX ADMIN — HEPARIN SODIUM 18 UNITS/KG/HR: 10000 INJECTION, SOLUTION INTRAVENOUS at 23:01

## 2024-06-02 RX ADMIN — HEPARIN SODIUM 6500 UNITS: 1000 INJECTION INTRAVENOUS; SUBCUTANEOUS at 19:06

## 2024-06-02 ASSESSMENT — PAIN DESCRIPTION - ORIENTATION: ORIENTATION: RIGHT;MID

## 2024-06-02 ASSESSMENT — PAIN SCALES - GENERAL
PAINLEVEL_OUTOF10: 5
PAINLEVEL_OUTOF10: 0

## 2024-06-02 ASSESSMENT — PAIN DESCRIPTION - DESCRIPTORS: DESCRIPTORS: ACHING

## 2024-06-02 ASSESSMENT — PAIN DESCRIPTION - LOCATION: LOCATION: BACK

## 2024-06-02 ASSESSMENT — PAIN - FUNCTIONAL ASSESSMENT: PAIN_FUNCTIONAL_ASSESSMENT: 0-10

## 2024-06-02 NOTE — ED PROVIDER NOTES
[facility] ED  eMERGENCY dEPARTMENT eNCOUnter      Pt Name: Kathryn Gipson  MRN: [id number]  Birthdate 6/11/1934  Date of evaluation: 6/2/2024  Provider: WEI Wiggins CNP    CHIEF COMPLAINT       Chief Complaint   Patient presents with    Shortness of Breath     On going, worse since Friday, inhaler not working at home         HISTORY OF PRESENT ILLNESS  (Location/Symptom, Timing/Onset, Context/Setting, Quality, Duration, Modifying Factors, Severity.)   Kathryn Gipson is a 89 y.o. female who presents to the emergency department for evaluation of worsening shortness of breath for the past 2 days.  Dyspnea worse with exertion.  Patient states she has had a dry cough for the past week.  She was recently placed on inhaler by her PCP for recent wheezing.  Patient does not have a history of asthma, COPD or tobacco use.  She does have a history of CAD, CABG, hypertension, CHF, mitral valve replacement.  She denies chest pain.  No abdominal pain nausea or vomiting.  Patient also complains of pain in her right lower back area.  Denies fall or injury.  No loss of bowel or bladder control.  No abdominal pain or dysuria.      Nursing Notes were reviewed.    ALLERGIES     Diclofenac    CURRENT MEDICATIONS       Previous Medications    ALBUTEROL SULFATE HFA (PROVENTIL HFA) 108 (90 BASE) MCG/ACT INHALER    Inhale 2 puffs into the lungs every 6 hours as needed for Wheezing    ASPIRIN 81 MG EC TABLET    Take 1 tablet by mouth daily    ATORVASTATIN (LIPITOR) 20 MG TABLET    Take 1 tablet by mouth nightly    CALCIUM CARBONATE (OSCAL) 500 MG TABS TABLET    Take 1 tablet by mouth daily    FUROSEMIDE (LASIX) 20 MG TABLET    Take 2 tablets by mouth daily    HANDICAP PLACARD MISC    by Does not apply route Exp: 3/9/2028    LOSARTAN (COZAAR) 100 MG TABLET    Take 1 tablet by mouth daily    METOPROLOL TARTRATE (LOPRESSOR) 25 MG TABLET    take 1 tablet by mouth twice a day    MULTIPLE VITAMIN PO    Take  by mouth 
Goldberger, MD  Attending Emergency Physician         Goldberger, Erica B, MD  06/02/24 [id number]

## 2024-06-02 NOTE — ED NOTES
ED TO INPATIENT SBAR HANDOFF    Patient Name: Kathryn Gipson   :  1934  89 y.o.   MRN:  [id number]  Preferred Name  Kathryn  ED Room #:  STA18/18  Family/Caregiver Present yes   Restraints no   Sitter no   Sepsis Risk Score Sepsis Risk Score: 2.52    Situation  Code Status: No Order No additional code details.    Allergies: Diclofenac  Weight: Patient Vitals for the past 96 hrs (Last 3 readings):   Weight   24 1407 81.6 kg (180 lb)     Arrived from: home  Chief Complaint:   Chief Complaint   Patient presents with    Shortness of Breath     On going, worse since Friday, inhaler not working at home     Hospital Problem/Diagnosis:  Active Problems:    * No active hospital problems. *  Resolved Problems:    * No resolved hospital problems. *    Imaging:   CT CHEST PULMONARY EMBOLISM W CONTRAST   Final Result   Extensive bilateral pulmonary embolism with findings suggestive of right   heart strain.         CT CHEST ABDOMEN PELVIS WO CONTRAST Additional Contrast? None   Final Result   1. There are ground-glass and patchy opacities along the periphery of the   right lower lobe which could represent an infectious/inflammatory process or   atelectasis.  Given concerns for right flank pain and peripheral location of   the opacities, findings could also be related to pulmonary embolism and a CT   PE study is recommended.   2. No acute findings in the abdomen or pelvis within limits of noncontrast   examination.  Specifically, no nephrolithiasis, hydronephrosis, or   obstructive uropathy.   3. Sigmoid diverticulosis without findings of acute diverticulitis.   4. Diffuse hepatic steatosis.   5. 0.5 cm left lower lobe pulmonary nodule.  No further follow-up is   recommended per Fleischner criteria.         XR CHEST PORTABLE   Final Result   No acute process.           Abnormal labs:   Abnormal Labs Reviewed   CBC WITH AUTO DIFFERENTIAL - Abnormal; Notable for the following components:       Result Value    WBC 15.1

## 2024-06-03 ENCOUNTER — ANESTHESIA EVENT (OUTPATIENT)
Dept: OPERATING ROOM | Age: 89
DRG: 164 | End: 2024-06-03
Payer: MEDICARE

## 2024-06-03 ENCOUNTER — OFFICE VISIT (OUTPATIENT)
Dept: OPERATING ROOM | Age: 88
End: 2024-06-03
Attending: SURGERY

## 2024-06-03 ENCOUNTER — ANESTHESIA (OUTPATIENT)
Dept: OPERATING ROOM | Age: 89
DRG: 164 | End: 2024-06-03
Payer: MEDICARE

## 2024-06-03 PROBLEM — I26.99 PULMONARY EMBOLUS (HCC): Status: ACTIVE | Noted: 2024-06-03

## 2024-06-03 LAB
ABO + RH BLD: NORMAL
ANION GAP SERPL CALCULATED.3IONS-SCNC: 13 MMOL/L (ref 9–16)
ANTI-XA UNFRAC HEPARIN: 0.49 IU/L
ANTI-XA UNFRAC HEPARIN: 1.32 IU/L
ANTI-XA UNFRAC HEPARIN: 1.69 IU/L
ANTI-XA UNFRAC HEPARIN: 1.82 IU/L
ARM BAND NUMBER: NORMAL
BASOPHILS # BLD: <0.03 K/UL (ref 0–0.2)
BASOPHILS NFR BLD: 0 % (ref 0–2)
BLOOD BANK SAMPLE EXPIRATION: NORMAL
BLOOD GROUP ANTIBODIES SERPL: NEGATIVE
BUN SERPL-MCNC: 24 MG/DL (ref 8–23)
CA-I BLD-SCNC: 1.12 MMOL/L (ref 1.13–1.33)
CALCIUM SERPL-MCNC: 9 MG/DL (ref 8.6–10.4)
CHLORIDE SERPL-SCNC: 99 MMOL/L (ref 98–107)
CO2 SERPL-SCNC: 21 MMOL/L (ref 20–31)
CREAT SERPL-MCNC: 1.1 MG/DL (ref 0.5–0.9)
ECHO BSA: 1.91 M2
EKG ATRIAL RATE: 79 BPM
EKG Q-T INTERVAL: 406 MS
EKG QRS DURATION: 82 MS
EKG QTC CALCULATION (BAZETT): 471 MS
EKG R AXIS: 129 DEGREES
EKG T AXIS: -96 DEGREES
EKG VENTRICULAR RATE: 81 BPM
EOSINOPHIL # BLD: <0.03 K/UL (ref 0–0.44)
EOSINOPHILS RELATIVE PERCENT: 0 % (ref 1–4)
ERYTHROCYTE [DISTWIDTH] IN BLOOD BY AUTOMATED COUNT: 14.6 % (ref 11.8–14.4)
GFR, ESTIMATED: 50 ML/MIN/1.73M2
GLUCOSE SERPL-MCNC: 189 MG/DL (ref 74–99)
HCT VFR BLD AUTO: 35.9 % (ref 36.3–47.1)
HGB BLD-MCNC: 12.1 G/DL (ref 11.9–15.1)
IMM GRANULOCYTES # BLD AUTO: 0.12 K/UL (ref 0–0.3)
IMM GRANULOCYTES NFR BLD: 1 %
LYMPHOCYTES NFR BLD: 1.28 K/UL (ref 1.1–3.7)
LYMPHOCYTES RELATIVE PERCENT: 9 % (ref 24–43)
MAGNESIUM SERPL-MCNC: 2.1 MG/DL (ref 1.6–2.4)
MCH RBC QN AUTO: 27.4 PG (ref 25.2–33.5)
MCHC RBC AUTO-ENTMCNC: 33.7 G/DL (ref 28.4–34.8)
MCV RBC AUTO: 81.2 FL (ref 82.6–102.9)
MONOCYTES NFR BLD: 0.61 K/UL (ref 0.1–1.2)
MONOCYTES NFR BLD: 4 % (ref 3–12)
NEUTROPHILS NFR BLD: 86 % (ref 36–65)
NEUTS SEG NFR BLD: 12.53 K/UL (ref 1.5–8.1)
NRBC BLD-RTO: 0 PER 100 WBC
PLATELET # BLD AUTO: 188 K/UL (ref 138–453)
PMV BLD AUTO: 10.6 FL (ref 8.1–13.5)
POTASSIUM SERPL-SCNC: 4 MMOL/L (ref 3.7–5.3)
RBC # BLD AUTO: 4.42 M/UL (ref 3.95–5.11)
RBC # BLD: ABNORMAL 10*6/UL
SODIUM SERPL-SCNC: 133 MMOL/L (ref 136–145)
WBC OTHER # BLD: 14.6 K/UL (ref 3.5–11.3)

## 2024-06-03 PROCEDURE — 86850 RBC ANTIBODY SCREEN: CPT

## 2024-06-03 PROCEDURE — 86901 BLOOD TYPING SEROLOGIC RH(D): CPT

## 2024-06-03 PROCEDURE — 2580000003 HC RX 258: Performed by: SURGERY

## 2024-06-03 PROCEDURE — 3600000007 HC SURGERY HYBRID BASE: Performed by: SURGERY

## 2024-06-03 PROCEDURE — 3700000001 HC ADD 15 MINUTES (ANESTHESIA): Performed by: SURGERY

## 2024-06-03 PROCEDURE — 2500000003 HC RX 250 WO HCPCS: Performed by: SURGERY

## 2024-06-03 PROCEDURE — A4217 STERILE WATER/SALINE, 500 ML: HCPCS | Performed by: SURGERY

## 2024-06-03 PROCEDURE — 2060000000 HC ICU INTERMEDIATE R&B

## 2024-06-03 PROCEDURE — 6360000002 HC RX W HCPCS

## 2024-06-03 PROCEDURE — 6370000000 HC RX 637 (ALT 250 FOR IP): Performed by: STUDENT IN AN ORGANIZED HEALTH CARE EDUCATION/TRAINING PROGRAM

## 2024-06-03 PROCEDURE — 2580000003 HC RX 258

## 2024-06-03 PROCEDURE — 85025 COMPLETE CBC W/AUTO DIFF WBC: CPT

## 2024-06-03 PROCEDURE — 6360000002 HC RX W HCPCS: Performed by: SURGERY

## 2024-06-03 PROCEDURE — 02CQ3ZZ EXTIRPATION OF MATTER FROM RIGHT PULMONARY ARTERY, PERCUTANEOUS APPROACH: ICD-10-PCS | Performed by: SURGERY

## 2024-06-03 PROCEDURE — 36415 COLL VENOUS BLD VENIPUNCTURE: CPT

## 2024-06-03 PROCEDURE — C1760 CLOSURE DEV, VASC: HCPCS | Performed by: SURGERY

## 2024-06-03 PROCEDURE — 2709999900 HC NON-CHARGEABLE SUPPLY: Performed by: SURGERY

## 2024-06-03 PROCEDURE — C1757 CATH, THROMBECTOMY/EMBOLECT: HCPCS | Performed by: SURGERY

## 2024-06-03 PROCEDURE — 85520 HEPARIN ASSAY: CPT

## 2024-06-03 PROCEDURE — C1892 INTRO/SHEATH,FIXED,PEEL-AWAY: HCPCS | Performed by: SURGERY

## 2024-06-03 PROCEDURE — 2580000003 HC RX 258: Performed by: ANESTHESIOLOGY

## 2024-06-03 PROCEDURE — 3600000017 HC SURGERY HYBRID ADDL 15MIN: Performed by: SURGERY

## 2024-06-03 PROCEDURE — 02CR3ZZ EXTIRPATION OF MATTER FROM LEFT PULMONARY ARTERY, PERCUTANEOUS APPROACH: ICD-10-PCS | Performed by: SURGERY

## 2024-06-03 PROCEDURE — B31TYZZ FLUOROSCOPY OF LEFT PULMONARY ARTERY USING OTHER CONTRAST: ICD-10-PCS | Performed by: SURGERY

## 2024-06-03 PROCEDURE — 6370000000 HC RX 637 (ALT 250 FOR IP)

## 2024-06-03 PROCEDURE — C1769 GUIDE WIRE: HCPCS | Performed by: SURGERY

## 2024-06-03 PROCEDURE — 83735 ASSAY OF MAGNESIUM: CPT

## 2024-06-03 PROCEDURE — B31SYZZ FLUOROSCOPY OF RIGHT PULMONARY ARTERY USING OTHER CONTRAST: ICD-10-PCS | Performed by: SURGERY

## 2024-06-03 PROCEDURE — 6360000004 HC RX CONTRAST MEDICATION: Performed by: SURGERY

## 2024-06-03 PROCEDURE — 2720000010 HC SURG SUPPLY STERILE: Performed by: SURGERY

## 2024-06-03 PROCEDURE — C1894 INTRO/SHEATH, NON-LASER: HCPCS | Performed by: SURGERY

## 2024-06-03 PROCEDURE — 82330 ASSAY OF CALCIUM: CPT

## 2024-06-03 PROCEDURE — 80048 BASIC METABOLIC PNL TOTAL CA: CPT

## 2024-06-03 PROCEDURE — 86900 BLOOD TYPING SEROLOGIC ABO: CPT

## 2024-06-03 PROCEDURE — 3700000000 HC ANESTHESIA ATTENDED CARE: Performed by: SURGERY

## 2024-06-03 RX ORDER — SODIUM CHLORIDE 0.9 % (FLUSH) 0.9 %
5-40 SYRINGE (ML) INJECTION EVERY 12 HOURS SCHEDULED
Status: DISCONTINUED | OUTPATIENT
Start: 2024-06-03 | End: 2024-06-04 | Stop reason: HOSPADM

## 2024-06-03 RX ORDER — FENTANYL CITRATE 50 UG/ML
INJECTION, SOLUTION INTRAMUSCULAR; INTRAVENOUS PRN
Status: DISCONTINUED | OUTPATIENT
Start: 2024-06-03 | End: 2024-06-03 | Stop reason: SDUPTHER

## 2024-06-03 RX ORDER — ONDANSETRON 2 MG/ML
4 INJECTION INTRAMUSCULAR; INTRAVENOUS EVERY 6 HOURS PRN
Status: DISCONTINUED | OUTPATIENT
Start: 2024-06-03 | End: 2024-06-04 | Stop reason: HOSPADM

## 2024-06-03 RX ORDER — ONDANSETRON 2 MG/ML
4 INJECTION INTRAMUSCULAR; INTRAVENOUS
Status: ACTIVE | OUTPATIENT
Start: 2024-06-03 | End: 2024-06-04

## 2024-06-03 RX ORDER — ONDANSETRON 4 MG/1
4 TABLET, ORALLY DISINTEGRATING ORAL EVERY 8 HOURS PRN
Status: DISCONTINUED | OUTPATIENT
Start: 2024-06-03 | End: 2024-06-04 | Stop reason: HOSPADM

## 2024-06-03 RX ORDER — FENTANYL CITRATE 50 UG/ML
50 INJECTION, SOLUTION INTRAMUSCULAR; INTRAVENOUS EVERY 5 MIN PRN
Status: DISCONTINUED | OUTPATIENT
Start: 2024-06-03 | End: 2024-06-04 | Stop reason: HOSPADM

## 2024-06-03 RX ORDER — SODIUM CHLORIDE 0.9 % (FLUSH) 0.9 %
5-40 SYRINGE (ML) INJECTION PRN
Status: DISCONTINUED | OUTPATIENT
Start: 2024-06-03 | End: 2024-06-04 | Stop reason: HOSPADM

## 2024-06-03 RX ORDER — ASPIRIN 81 MG/1
81 TABLET ORAL DAILY
Status: DISCONTINUED | OUTPATIENT
Start: 2024-06-03 | End: 2024-06-04 | Stop reason: HOSPADM

## 2024-06-03 RX ORDER — FENTANYL CITRATE 50 UG/ML
25 INJECTION, SOLUTION INTRAMUSCULAR; INTRAVENOUS EVERY 5 MIN PRN
Status: DISCONTINUED | OUTPATIENT
Start: 2024-06-03 | End: 2024-06-04 | Stop reason: HOSPADM

## 2024-06-03 RX ORDER — LABETALOL HYDROCHLORIDE 5 MG/ML
10 INJECTION, SOLUTION INTRAVENOUS EVERY 4 HOURS PRN
Status: DISCONTINUED | OUTPATIENT
Start: 2024-06-03 | End: 2024-06-04 | Stop reason: HOSPADM

## 2024-06-03 RX ORDER — CALCIUM CARBONATE 500 MG/1
500 TABLET, CHEWABLE ORAL DAILY
Status: DISCONTINUED | OUTPATIENT
Start: 2024-06-03 | End: 2024-06-04 | Stop reason: HOSPADM

## 2024-06-03 RX ORDER — PANTOPRAZOLE SODIUM 40 MG/1
40 TABLET, DELAYED RELEASE ORAL
Status: DISCONTINUED | OUTPATIENT
Start: 2024-06-03 | End: 2024-06-04 | Stop reason: HOSPADM

## 2024-06-03 RX ORDER — ETOMIDATE 2 MG/ML
INJECTION INTRAVENOUS
Status: DISPENSED
Start: 2024-06-03 | End: 2024-06-03

## 2024-06-03 RX ORDER — ATORVASTATIN CALCIUM 40 MG/1
20 TABLET, FILM COATED ORAL NIGHTLY
Status: DISCONTINUED | OUTPATIENT
Start: 2024-06-03 | End: 2024-06-04 | Stop reason: HOSPADM

## 2024-06-03 RX ORDER — HEPARIN SODIUM 10000 [USP'U]/100ML
5-30 INJECTION, SOLUTION INTRAVENOUS CONTINUOUS
Status: ACTIVE | OUTPATIENT
Start: 2024-06-03 | End: 2024-06-03

## 2024-06-03 RX ORDER — IODIXANOL 320 MG/ML
INJECTION, SOLUTION INTRAVASCULAR
Status: DISPENSED
Start: 2024-06-03 | End: 2024-06-03

## 2024-06-03 RX ORDER — FUROSEMIDE 40 MG/1
40 TABLET ORAL DAILY
Status: DISCONTINUED | OUTPATIENT
Start: 2024-06-03 | End: 2024-06-04 | Stop reason: HOSPADM

## 2024-06-03 RX ORDER — OXYCODONE HYDROCHLORIDE 5 MG/1
2.5 TABLET ORAL EVERY 6 HOURS PRN
Status: DISCONTINUED | OUTPATIENT
Start: 2024-06-03 | End: 2024-06-04 | Stop reason: HOSPADM

## 2024-06-03 RX ORDER — NALOXONE HYDROCHLORIDE 0.4 MG/ML
INJECTION, SOLUTION INTRAMUSCULAR; INTRAVENOUS; SUBCUTANEOUS PRN
Status: DISCONTINUED | OUTPATIENT
Start: 2024-06-03 | End: 2024-06-04 | Stop reason: HOSPADM

## 2024-06-03 RX ORDER — IODIXANOL 320 MG/ML
INJECTION, SOLUTION INTRAVASCULAR PRN
Status: DISCONTINUED | OUTPATIENT
Start: 2024-06-03 | End: 2024-06-03 | Stop reason: ALTCHOICE

## 2024-06-03 RX ORDER — SODIUM CHLORIDE, SODIUM LACTATE, POTASSIUM CHLORIDE, CALCIUM CHLORIDE 600; 310; 30; 20 MG/100ML; MG/100ML; MG/100ML; MG/100ML
INJECTION, SOLUTION INTRAVENOUS CONTINUOUS PRN
Status: DISCONTINUED | OUTPATIENT
Start: 2024-06-03 | End: 2024-06-03 | Stop reason: SDUPTHER

## 2024-06-03 RX ORDER — LIDOCAINE HYDROCHLORIDE 10 MG/ML
INJECTION, SOLUTION INFILTRATION; PERINEURAL
Status: DISPENSED
Start: 2024-06-03 | End: 2024-06-03

## 2024-06-03 RX ORDER — HEPARIN SODIUM 1000 [USP'U]/ML
INJECTION, SOLUTION INTRAVENOUS; SUBCUTANEOUS PRN
Status: DISCONTINUED | OUTPATIENT
Start: 2024-06-03 | End: 2024-06-03 | Stop reason: SDUPTHER

## 2024-06-03 RX ORDER — MIDAZOLAM HYDROCHLORIDE 1 MG/ML
INJECTION INTRAMUSCULAR; INTRAVENOUS PRN
Status: DISCONTINUED | OUTPATIENT
Start: 2024-06-03 | End: 2024-06-03 | Stop reason: SDUPTHER

## 2024-06-03 RX ORDER — LIDOCAINE HYDROCHLORIDE 10 MG/ML
INJECTION, SOLUTION INFILTRATION; PERINEURAL PRN
Status: DISCONTINUED | OUTPATIENT
Start: 2024-06-03 | End: 2024-06-03 | Stop reason: ALTCHOICE

## 2024-06-03 RX ORDER — SODIUM CHLORIDE 9 MG/ML
INJECTION, SOLUTION INTRAVENOUS PRN
Status: DISCONTINUED | OUTPATIENT
Start: 2024-06-03 | End: 2024-06-04 | Stop reason: HOSPADM

## 2024-06-03 RX ORDER — ALBUTEROL SULFATE 90 UG/1
2 AEROSOL, METERED RESPIRATORY (INHALATION) EVERY 6 HOURS PRN
Status: DISCONTINUED | OUTPATIENT
Start: 2024-06-03 | End: 2024-06-04 | Stop reason: HOSPADM

## 2024-06-03 RX ORDER — HYDRALAZINE HYDROCHLORIDE 20 MG/ML
10 INJECTION INTRAMUSCULAR; INTRAVENOUS EVERY 4 HOURS PRN
Status: DISCONTINUED | OUTPATIENT
Start: 2024-06-03 | End: 2024-06-04 | Stop reason: HOSPADM

## 2024-06-03 RX ORDER — SODIUM CHLORIDE, SODIUM LACTATE, POTASSIUM CHLORIDE, CALCIUM CHLORIDE 600; 310; 30; 20 MG/100ML; MG/100ML; MG/100ML; MG/100ML
INJECTION, SOLUTION INTRAVENOUS CONTINUOUS
Status: DISCONTINUED | OUTPATIENT
Start: 2024-06-03 | End: 2024-06-04 | Stop reason: HOSPADM

## 2024-06-03 RX ORDER — METHOCARBAMOL 500 MG/1
500 TABLET, FILM COATED ORAL EVERY 6 HOURS
Status: DISCONTINUED | OUTPATIENT
Start: 2024-06-03 | End: 2024-06-04 | Stop reason: HOSPADM

## 2024-06-03 RX ORDER — ACETAMINOPHEN 500 MG
1000 TABLET ORAL EVERY 8 HOURS
Status: DISCONTINUED | OUTPATIENT
Start: 2024-06-03 | End: 2024-06-04 | Stop reason: HOSPADM

## 2024-06-03 RX ADMIN — SODIUM CHLORIDE, PRESERVATIVE FREE 10 ML: 5 INJECTION INTRAVENOUS at 20:38

## 2024-06-03 RX ADMIN — MIDAZOLAM 0.5 MG: 1 INJECTION INTRAMUSCULAR; INTRAVENOUS at 08:20

## 2024-06-03 RX ADMIN — METOPROLOL TARTRATE 25 MG: 25 TABLET, FILM COATED ORAL at 20:37

## 2024-06-03 RX ADMIN — APIXABAN 5 MG: 5 TABLET, FILM COATED ORAL at 20:37

## 2024-06-03 RX ADMIN — FENTANYL CITRATE 25 MCG: 50 INJECTION, SOLUTION INTRAMUSCULAR; INTRAVENOUS at 08:24

## 2024-06-03 RX ADMIN — ATORVASTATIN CALCIUM 20 MG: 40 TABLET, FILM COATED ORAL at 20:38

## 2024-06-03 RX ADMIN — SODIUM CHLORIDE, POTASSIUM CHLORIDE, SODIUM LACTATE AND CALCIUM CHLORIDE: 600; 310; 30; 20 INJECTION, SOLUTION INTRAVENOUS at 02:54

## 2024-06-03 RX ADMIN — ACETAMINOPHEN 1000 MG: 500 TABLET ORAL at 20:37

## 2024-06-03 RX ADMIN — HEPARIN SODIUM 5000 UNITS: 1000 INJECTION INTRAVENOUS; SUBCUTANEOUS at 08:35

## 2024-06-03 RX ADMIN — SODIUM CHLORIDE, POTASSIUM CHLORIDE, SODIUM LACTATE AND CALCIUM CHLORIDE: 600; 310; 30; 20 INJECTION, SOLUTION INTRAVENOUS at 08:07

## 2024-06-03 ASSESSMENT — PAIN SCALES - GENERAL: PAINLEVEL_OUTOF10: 3

## 2024-06-03 NOTE — CARE COORDINATION
Case Management Assessment  Initial Evaluation    Date/Time of Evaluation: 6/3/2024 11:07 AM  Assessment Completed by: YAMINI ELLIS RN    If patient is discharged prior to next notation, then this note serves as note for discharge by case management.    Patient Name: Kathryn Gipson                   YOB: 1934  Diagnosis: Pulmonary embolism, bilateral (HCC) [I26.99]  Multiple subsegmental pulmonary emboli without acute cor pulmonale (HCC) [I26.94]                   Date / Time: 6/2/2024 10:24 PM    Patient Admission Status: Inpatient   Readmission Risk (Low < 19, Mod (19-27), High > 27): Readmission Risk Score: 14.2    Current PCP: Rosio Eason APRN - CNP  PCP verified by CM? (P) Yes    Chart Reviewed: Yes      History Provided by: (P) Patient  Patient Orientation: (P) Alert and Oriented, Person, Place, Situation    Patient Cognition: (P) Alert    Hospitalization in the last 30 days (Readmission):  No    If yes, Readmission Assessment in  Navigator will be completed.    Advance Directives:      Code Status: Full Code   Patient's Primary Decision Maker is: (P) Legal Next of Kin    Primary Decision Maker: Annie Cuellar - Child - [phone]    Discharge Planning:    Patient lives with: (P) Spouse/Significant Other Type of Home: (P) Apartment  Primary Care Giver: (P) Self  Patient Support Systems include: (P) Spouse/Significant Other   Current Financial resources: (P) Medicare  Current community resources:    Current services prior to admission: None            Current DME:              Type of Home Care services:  (P) None    ADLS  Prior functional level: (P) Independent in ADLs/IADLs  Current functional level: (P) Independent in ADLs/IADLs    PT AM-PAC:   /24  OT AM-PAC:   /24    Family can provide assistance at DC: (P) Yes  Would you like Case Management to discuss the discharge plan with any other family members/significant others, and if so, who? (P) No  Plans to Return

## 2024-06-03 NOTE — PLAN OF CARE
Problem: Discharge Planning  Goal: Discharge to home or other facility with appropriate resources  Outcome: Progressing  Flowsheets (Taken 6/3/2024 0300)  Discharge to home or other facility with appropriate resources:   Identify barriers to discharge with patient and caregiver   Arrange for needed discharge resources and transportation as appropriate     Problem: ABCDS Injury Assessment  Goal: Absence of physical injury  Outcome: Progressing     Problem: Skin/Tissue Integrity  Goal: Absence of new skin breakdown  Description: 1.  Monitor for areas of redness and/or skin breakdown  2.  Assess vascular access sites hourly  3.  Every 4-6 hours minimum:  Change oxygen saturation probe site  4.  Every 4-6 hours:  If on nasal continuous positive airway pressure, respiratory therapy assess nares and determine need for appliance change or resting period.  Outcome: Progressing

## 2024-06-03 NOTE — ANESTHESIA PRE PROCEDURE
Department of Anesthesiology  Preprocedure Note       Name:  Kathryn Gipson   Age:  89 y.o.  :  1934                                          MRN:  [id number]         Date:  6/3/2024      Surgeon: Surgeon(s):  Yeny Taragno MD    Procedure: Procedure(s):  THROMBECTOMY MECHANICAL PERCUTANEOUS - LUNGS    Medications prior to admission:   Prior to Admission medications    Medication Sig Start Date End Date Taking? Authorizing Provider   tiotropium-olodaterol (STIOLTO RESPIMAT) 2.5-2.5 MCG/ACT AERS Inhale 2 puffs into the lungs daily 24   Rosio Eason APRN - CNP   losartan (COZAAR) 100 MG tablet Take 1 tablet by mouth daily 24   Rosio Eason APRN - CNP   omeprazole (PRILOSEC) 20 MG delayed release capsule Take 1 capsule by mouth daily 24   Rosio Eason APRN - CNP   atorvastatin (LIPITOR) 20 MG tablet Take 1 tablet by mouth nightly 10/2/23   Rosio Eason APRN - CNP   metoprolol tartrate (LOPRESSOR) 25 MG tablet take 1 tablet by mouth twice a day 10/2/23   Rosio Eason APRN - CNP   Handicap Placard MISC by Does not apply route Exp: 3/9/2028 3/10/23   Rosio Eason APRN - CNP   albuterol sulfate HFA (PROVENTIL HFA) 108 (90 Base) MCG/ACT inhaler Inhale 2 puffs into the lungs every 6 hours as needed for Wheezing 23   Lubna Valencia APRN - NP   furosemide (LASIX) 20 MG tablet Take 2 tablets by mouth daily 21   ProviderAnny MD   aspirin 81 MG EC tablet Take 1 tablet by mouth daily    Anny Cortez MD   MULTIPLE VITAMIN PO Take  by mouth daily.    Anny Cortez MD   calcium carbonate (OSCAL) 500 MG TABS tablet Take 1 tablet by mouth daily    Anny Cortez MD       Current medications:    Current Facility-Administered Medications   Medication Dose Route Frequency Provider Last Rate Last Admin    albuterol sulfate HFA (PROVENTIL;VENTOLIN;PROAIR) 108 (90 Base) MCG/ACT inhaler 2 puff  2 puff

## 2024-06-03 NOTE — ED NOTES
Pt resting in bed. Pt alert and oriented x4. Family at bedside.   RR non labored and even. Call light within reach. No distress noted.

## 2024-06-03 NOTE — ED NOTES
89-year-old female last name Leonela, bilateral PEs, started on heparin, vascular surgery aware    Accepted by Dr. Carbajal

## 2024-06-03 NOTE — ED PROVIDER NOTES
[facility] ED  Emergency Department Encounter  Emergency Medicine Resident     Pt Name:Kathryn Gipson  MRN: [id number]  Birthdate 6/11/1934  Date of evaluation: 6/2/24  PCP:  Rosio Eason APRN - CNP  Note Started: 10:46 PM EDT      CHIEF COMPLAINT       No chief complaint on file.      HISTORY OF PRESENT ILLNESS  (Location/Symptom, Timing/Onset, Context/Setting, Quality, Duration, Modifying Factors, Severity.)      Kathryn Gipson is a 89 y.o. female who presents as a transfer from [facility].  Patient presented there with shortness of breath as well as generalized fatigue.  CT imaging there showing bilateral pulmonary embolism with right heart strain.  Patient has a history of A-fib is not on any anticoagulation.  Denies any complaints at this time    PAST MEDICAL / SURGICAL / SOCIAL / FAMILY HISTORY      has a past medical history of Acid reflux, Atrial fibrillation (HCC), CAD (coronary artery disease), HTN (hypertension), and Hyperlipidemia.       has a past surgical history that includes Mitral valve surgery (03/14/2016); joint replacement (Bilateral); Nasal septum surgery; bladder suspension; Hysterectomy; and Appendectomy.      Social History     Socioeconomic History    Marital status:      Spouse name: Not on file    Number of children: Not on file    Years of education: Not on file    Highest education level: Not on file   Occupational History    Not on file   Tobacco Use    Smoking status: Never    Smokeless tobacco: Never   Substance and Sexual Activity    Alcohol use: No    Drug use: No    Sexual activity: Never   Other Topics Concern    Not on file   Social History Narrative    Not on file     Social Determinants of Health     Financial Resource Strain: Low Risk  (4/1/2024)    Overall Financial Resource Strain (CARDIA)     Difficulty of Paying Living Expenses: Not hard at all   Food Insecurity: No Food Insecurity (4/1/2024)    Hunger Vital Sign     Worried  As tolerated

## 2024-06-03 NOTE — OP NOTE
Operative Note      Patient: Kathryn Gipson  YOB: 1934  MRN: [id number]    Date of Procedure: 6/3/2024    Preoperative Diagnosis:  Bilateral pulmonary artery embolism with right heart strain    Post-Op Diagnosis: Same       Procedure:  1) US guided vascular access and percutaneous closure of right common femoral vein  2) Selective catheterization of left upper and lower pulmonary arteries  3) Percutaneous mechanical thrombectomy and extirpation of thrombus from left upper and lower pulmonary arteries  4) Selective catheterization of right upper, middle and lower pulmonary arteries  5) Percutaneous mechanical thrombectomy and extirpation of thrombus from right upper, middle and lower pulmonary arteries  6) Bilateral pulmonary artery angiogram    Surgeon(s):  Yeny Tarango MD    Assistant:     Anesthesia: General    Estimated Blood Loss (mL): 25 ml from device, 15 ml from field, total of 40 ml    Complications: None    Specimens: Bilateral pulmonary artery embolism      Implants: none      Drains: none    Findings:  Infection Present At Time Of Surgery (PATOS) (choose all levels that have infection present):  No infection present  Other Findings: Extensive thrombus removed from bilateral pulmonary arteries.  Significant drop in PA pressures 74/24(49) to 66/15(39) after thrombectomy    Plan:  Monitor in ICU for a few hours, if no issues she can be transferred to step down and started on oral anticoagulation this evening.    Detailed Description of Procedure:     Kathryn Gipson was brought to the hybrid catheterization suite for mechanical thrombectomy of bilateral pulmonary embolus with right heart strain.  After appropriate timeout performed, bilateral groin sites prepped and draped in standard sterile fashion.  I began by evaluating the right common femoral vein under ultrasound, it was patent and compressible.  Local anesthesia delivered and under ultrasound guidance using a

## 2024-06-03 NOTE — ED NOTES
Pt presents to Ed by EMS, transferred from [facility], pt  had SOB , CT scan showed bilateral PE's, pt has AFIB, Hx of asthma,  hx of open heart surgery in 2016. pt not on blood thinners currently. Pt was given breathing treatment prior to arrival, heparin given 6500 unit bolus prior to arrival and started on heparin drip. Pt denies any pain. Pt put on cardiac monitor, labs, EKG done. Pt alert and oriented x4.

## 2024-06-03 NOTE — ED PROVIDER NOTES
[facility]     Emergency Department     Faculty Attestation    I performed a history and physical examination of the patient and discussed management with the resident. I reviewed the resident´s note and agree with the documented findings and plan of care. Any areas of disagreement are noted on the chart. I was personally present for the key portions of any procedures. I have documented in the chart those procedures where I was not present during the key portions. I have reviewed the emergency nurses triage note. I agree with the chief complaint, past medical history, past surgical history, allergies, medications, social and family history as documented unless otherwise noted below. For Physician Assistant/ Nurse Practitioner cases/documentation I have personally evaluated this patient and have completed at least one if not all key elements of the E/M (history, physical exam, and MDM). Additional findings are as noted.      Transfer for bilateral PEs, vascular surgery aware, patient started on heparin before arrival, patient awake in no distress, no lower extremity pain or swelling on examination.     Westley Carbajal MD  06/02/24 0718       EKG Interpretation    Interpreted by emergency department physician    Rhythm: Atrial fibrillation  Rate: normal/86  Axis: normal 35  Ectopy: none  Conduction: Atrial fibrillation  ST Segments: no acute change  T Waves: Anterior lateral inversion  Q Waves: III, V1    Clinical Impression: Abnormal EKG    Westley Carbajal, Westley Matos MD  06/02/24 [id number]

## 2024-06-03 NOTE — ANESTHESIA POSTPROCEDURE EVALUATION
Department of Anesthesiology  Postprocedure Note    Patient: Kathryn Gipson  MRN: [id number]  YOB: 1934  Date of evaluation: 6/3/2024    Procedure Summary       Date: 06/03/24 Room / Location: [place] [address] / [facility]    Anesthesia Start: 0808 Anesthesia Stop: 0928    Procedure: BILATERAL PULMONARY THROMBECTOMY MECHANICAL PERCUTANEOUS / INARI (Bilateral: Groin) Diagnosis:       Bilateral pulmonary embolism (HCC)      (Bilateral pulmonary embolism (HCC) [I26.99])    Surgeons: Yeny Tarango MD Responsible Provider: Luis Velasquez MD    Anesthesia Type: MAC ASA Status: 4 - Emergent            Anesthesia Type: No value filed.    Wiliam Phase I:      Wiliam Phase II:    POST-OP ANESTHESIA NOTE       /68   Pulse 80   Temp 97.8 °F (36.6 °C) (Oral)   Resp 13   Ht 1.575 m (5' 2\")   Wt 83.6 kg (184 lb 4.9 oz)   SpO2 94%   BMI 33.71 kg/m²    Pain Assessment: None - Denies Pain  Pain Level: 0       Anesthesia Post Evaluation    Patient location during evaluation: ICU  Patient participation: complete - patient participated  Level of consciousness: awake  Pain score: 0  Airway patency: patent  Nausea & Vomiting: no nausea and no vomiting  Cardiovascular status: hemodynamically stable  Respiratory status: acceptable  Hydration status: stable  Pain management: adequate        No notable events documented.

## 2024-06-03 NOTE — H&P
Division of Vascular Surgery          Vascular history and physical      Name: Kathryn Gipson  MRN: [id number]     6/3/2024  2:16 AM    Physician Requesting Consult: Dr. Ortiz    Reason for Consult:   Bilateral acute PE    Chief Complaint:     Shortness of breath on exertion    History of Present Illness:      Kathryn Gipson is a 89 y.o.  female with medical history of atrial fibrillation, CAD, HTN, HLD who presents with complaint of shortness of breath for several weeks which has increased in severity and is currently present on minimal exertion.  No complaint of any chest pain, cough, fever, chills.  No complaint of any swelling in the bilateral lower extremity.  Patient has history of A-fib but does not take any anticoagulation for it.  Patient does take aspirin 81 mg daily.  On metoprolol 25 mg twice daily.    Has history of CHF and takes Lasix for it.    Past Medical History:     Past Medical History:   Diagnosis Date    Acid reflux     Atrial fibrillation (HCC)     CAD (coronary artery disease)     HTN (hypertension)     Hyperlipidemia         Past Surgical History:     Past Surgical History:   Procedure Laterality Date    APPENDECTOMY      BLADDER SUSPENSION      HYSTERECTOMY (CERVIX STATUS UNKNOWN)      JOINT REPLACEMENT Bilateral     MITRAL VALVE SURGERY  03/14/2016    TT/Dr. Clarke    NASAL SEPTUM SURGERY          Medications Prior to Admission:       Prior to Admission medications    Medication Sig Start Date End Date Taking? Authorizing Provider   tiotropium-olodaterol (STIOLTO RESPIMAT) 2.5-2.5 MCG/ACT AERS Inhale 2 puffs into the lungs daily 5/30/24   Rosio Eason APRN - CNP   losartan (COZAAR) 100 MG tablet Take 1 tablet by mouth daily 4/12/24   Rosio Eason APRN - CNP   omeprazole (PRILOSEC) 20 MG delayed release capsule Take 1 capsule by mouth daily 2/1/24   Rosio Eason APRN - CNP   atorvastatin (LIPITOR) 20 MG tablet Take 1 tablet by mouth nightly 10/2/23

## 2024-06-03 NOTE — ED PROVIDER NOTES
Faculty Sign-Out Attestation  Handoff taken on the following patient from prior Attending Physician: Solomon  Note Started: 11:16 PM EDT    I was available and discussed any additional care issues that arose and coordinated the management plans with the resident(s) caring for the patient during my duty period. Any areas of disagreement with resident’s documentation of care or procedures are noted on the chart. I was personally present for the key portions of any/all procedures during my duty period. I have documented in the chart those procedures where I was not present during the key portions.    Bilateral PE with R heart strain, on heparin gtt,   Vascular consulted,   Dr Tarango at bedside & wishes to admit     Josh Weaver,   06/02/24 [id number]

## 2024-06-03 NOTE — ED NOTES
Pt resting in bed. RR non labored and even. Pt alert and oriented x4. Family at bedside. No distress noted.   Family at bedside.

## 2024-06-03 NOTE — ED NOTES
Pt resting in bed. Pt alert and oriented x4. RR non labored and even. Family at bedside.   Call light within reach. No distress noted.

## 2024-06-04 ENCOUNTER — APPOINTMENT (OUTPATIENT)
Dept: VASCULAR LAB | Age: 89
DRG: 164 | End: 2024-06-04
Payer: MEDICARE

## 2024-06-04 VITALS
BODY MASS INDEX: 34.08 KG/M2 | TEMPERATURE: 97.9 F | RESPIRATION RATE: 16 BRPM | WEIGHT: 185.19 LBS | SYSTOLIC BLOOD PRESSURE: 136 MMHG | OXYGEN SATURATION: 94 % | HEIGHT: 62 IN | HEART RATE: 71 BPM | DIASTOLIC BLOOD PRESSURE: 50 MMHG

## 2024-06-04 LAB
ANION GAP SERPL CALCULATED.3IONS-SCNC: 10 MMOL/L (ref 9–16)
BASOPHILS # BLD: <0.03 K/UL (ref 0–0.2)
BASOPHILS NFR BLD: 0 % (ref 0–2)
BUN SERPL-MCNC: 32 MG/DL (ref 8–23)
CA-I BLD-SCNC: 1.16 MMOL/L (ref 1.13–1.33)
CALCIUM SERPL-MCNC: 8.9 MG/DL (ref 8.6–10.4)
CHLORIDE SERPL-SCNC: 101 MMOL/L (ref 98–107)
CO2 SERPL-SCNC: 24 MMOL/L (ref 20–31)
CREAT SERPL-MCNC: 1 MG/DL (ref 0.5–0.9)
EKG ATRIAL RATE: 60 BPM
EKG Q-T INTERVAL: 450 MS
EKG QRS DURATION: 88 MS
EKG QTC CALCULATION (BAZETT): 538 MS
EKG R AXIS: 35 DEGREES
EKG T AXIS: -100 DEGREES
EKG VENTRICULAR RATE: 86 BPM
EOSINOPHIL # BLD: <0.03 K/UL (ref 0–0.44)
EOSINOPHILS RELATIVE PERCENT: 0 % (ref 1–4)
ERYTHROCYTE [DISTWIDTH] IN BLOOD BY AUTOMATED COUNT: 14.6 % (ref 11.8–14.4)
GFR, ESTIMATED: 53 ML/MIN/1.73M2
GLUCOSE SERPL-MCNC: 114 MG/DL (ref 74–99)
HCT VFR BLD AUTO: 35.4 % (ref 36.3–47.1)
HGB BLD-MCNC: 11.2 G/DL (ref 11.9–15.1)
IMM GRANULOCYTES # BLD AUTO: 0.1 K/UL (ref 0–0.3)
IMM GRANULOCYTES NFR BLD: 1 %
LYMPHOCYTES NFR BLD: 1.61 K/UL (ref 1.1–3.7)
LYMPHOCYTES RELATIVE PERCENT: 12 % (ref 24–43)
MAGNESIUM SERPL-MCNC: 2.2 MG/DL (ref 1.6–2.4)
MCH RBC QN AUTO: 27.5 PG (ref 25.2–33.5)
MCHC RBC AUTO-ENTMCNC: 31.6 G/DL (ref 28.4–34.8)
MCV RBC AUTO: 86.8 FL (ref 82.6–102.9)
MONOCYTES NFR BLD: 1.08 K/UL (ref 0.1–1.2)
MONOCYTES NFR BLD: 8 % (ref 3–12)
NEUTROPHILS NFR BLD: 80 % (ref 36–65)
NEUTS SEG NFR BLD: 11.22 K/UL (ref 1.5–8.1)
NRBC BLD-RTO: 0 PER 100 WBC
PLATELET # BLD AUTO: 198 K/UL (ref 138–453)
PMV BLD AUTO: 10.6 FL (ref 8.1–13.5)
POTASSIUM SERPL-SCNC: 4.8 MMOL/L (ref 3.7–5.3)
RBC # BLD AUTO: 4.08 M/UL (ref 3.95–5.11)
RBC # BLD: ABNORMAL 10*6/UL
SODIUM SERPL-SCNC: 135 MMOL/L (ref 136–145)
WBC OTHER # BLD: 14 K/UL (ref 3.5–11.3)

## 2024-06-04 PROCEDURE — 36415 COLL VENOUS BLD VENIPUNCTURE: CPT

## 2024-06-04 PROCEDURE — 83735 ASSAY OF MAGNESIUM: CPT

## 2024-06-04 PROCEDURE — 97530 THERAPEUTIC ACTIVITIES: CPT

## 2024-06-04 PROCEDURE — 93970 EXTREMITY STUDY: CPT

## 2024-06-04 PROCEDURE — 6370000000 HC RX 637 (ALT 250 FOR IP)

## 2024-06-04 PROCEDURE — 6370000000 HC RX 637 (ALT 250 FOR IP): Performed by: STUDENT IN AN ORGANIZED HEALTH CARE EDUCATION/TRAINING PROGRAM

## 2024-06-04 PROCEDURE — 97162 PT EVAL MOD COMPLEX 30 MIN: CPT

## 2024-06-04 PROCEDURE — 85025 COMPLETE CBC W/AUTO DIFF WBC: CPT

## 2024-06-04 PROCEDURE — 82330 ASSAY OF CALCIUM: CPT

## 2024-06-04 PROCEDURE — 80048 BASIC METABOLIC PNL TOTAL CA: CPT

## 2024-06-04 RX ADMIN — METOPROLOL TARTRATE 25 MG: 25 TABLET, FILM COATED ORAL at 09:58

## 2024-06-04 RX ADMIN — FUROSEMIDE 40 MG: 40 TABLET ORAL at 09:58

## 2024-06-04 RX ADMIN — PANTOPRAZOLE SODIUM 40 MG: 40 TABLET, DELAYED RELEASE ORAL at 06:02

## 2024-06-04 RX ADMIN — ANTACID TABLETS 500 MG: 500 TABLET, CHEWABLE ORAL at 09:57

## 2024-06-04 RX ADMIN — APIXABAN 5 MG: 5 TABLET, FILM COATED ORAL at 09:57

## 2024-06-04 RX ADMIN — ASPIRIN 81 MG: 81 TABLET, COATED ORAL at 09:57

## 2024-06-04 NOTE — DISCHARGE INSTRUCTIONS
Patient Discharge Instructions  Discharge Date:  6/4/2024    HYGEINE: Ok to shower after surgery, no soaking in a tub/pool until seen at your follow up appointment. Clean incision daily with gentle soap and water, do not use alcohol/peroxide or any harsh cleansers. There is surgical glue over your incision(s), do not pick or peel off the glue - this will naturally fall off in 7-14 days.     DRIVING: No driving while taking narcotic medications or while in pain    ACTIVITY: No lifting greater than 10lbs for 4-6 weeks or any strenuous activity.     DIET: Resume your normal diet as advised by your PCP.    MEDICATIONS: Take all medications as prescribed. Take medications as prescribed by physician. For adequate pain control, take tylenol and motrin in alternating cycles (e.g. take 500mg tylenol at 8am, take 400mg motrin at 12pm, take 500mg tylenol at 4pm, take 400mg motrin at 8pm, etc.). If prescribed narcotic medications (Norco, Percocet, or Roxicodone), use for breakthrough pain and attempt to wean off medications as soon as possible. Do not take more than 4000mg tylenol in 24 hours.     Need to take Eliquis 5 mg twice daily for at least 6 months to a year, follow-up with Dr. Tarango as outpatient    SPECIAL INSTRUCTIONS:     Follow up with Dr. Tarango as outpatient, call the clinic for appointment. Call sooner if fever above 101.4 degrees Celsius, increase in swelling or redness, thick purulent discharge or pain not controlled with medications.

## 2024-06-04 NOTE — DISCHARGE SUMMARY
6/2/2024 10:33 PM EKG 12 Lead Preliminary             Patient Instructions:   Current Discharge Medication List        START taking these medications    Details   apixaban (ELIQUIS) 5 MG TABS tablet Take 1 tablet by mouth 2 times daily  Qty: 180 tablet, Refills: 1           CONTINUE these medications which have NOT CHANGED    Details   tiotropium-olodaterol (STIOLTO RESPIMAT) 2.5-2.5 MCG/ACT AERS Inhale 2 puffs into the lungs daily  Qty: 1 each, Refills: 5    Associated Diagnoses: Chronic obstructive pulmonary disease, unspecified COPD type (HCC)      losartan (COZAAR) 100 MG tablet Take 1 tablet by mouth daily  Qty: 90 tablet, Refills: 1      omeprazole (PRILOSEC) 20 MG delayed release capsule Take 1 capsule by mouth daily  Qty: 90 capsule, Refills: 1    Associated Diagnoses: Gastroesophageal reflux disease without esophagitis      atorvastatin (LIPITOR) 20 MG tablet Take 1 tablet by mouth nightly  Qty: 90 tablet, Refills: 1    Associated Diagnoses: Hyperlipidemia, unspecified hyperlipidemia type      metoprolol tartrate (LOPRESSOR) 25 MG tablet take 1 tablet by mouth twice a day  Qty: 180 tablet, Refills: 1    Associated Diagnoses: Essential hypertension      Handicap Placard MISC by Does not apply route Exp: 3/9/2028  Qty: 1 each, Refills: 0    Associated Diagnoses: Osteoarthritis, unspecified osteoarthritis type, unspecified site      albuterol sulfate HFA (PROVENTIL HFA) 108 (90 Base) MCG/ACT inhaler Inhale 2 puffs into the lungs every 6 hours as needed for Wheezing  Qty: 18 g, Refills: 3    Associated Diagnoses: Wheezing      furosemide (LASIX) 20 MG tablet Take 2 tablets by mouth daily      aspirin 81 MG EC tablet Take 1 tablet by mouth daily      MULTIPLE VITAMIN PO Take  by mouth daily.      calcium carbonate (OSCAL) 500 MG TABS tablet Take 1 tablet by mouth daily           Activity: activity as tolerated  Diet: regular diet  Wound Care: keep wound clean and dry  Continues to take Eliquis 5 mg twice daily

## 2024-06-04 NOTE — PROGRESS NOTES
Division of Vascular Surgery             Progress Note      Name: Kathryn Gipson  MRN: [id number]         Overnight Events:     No acute event overnight      Subjective:     Patient seen examined this morning, no acute event overnight.  She reports of having increased motor function, and improved respiratory status.  She walked with PT OT yesterday.  At room air, no respiratory distress or chest pain.    Physical Exam:     Vitals:  BP (!) 136/112   Pulse 62   Temp 97.5 °F (36.4 °C) (Axillary)   Resp 22   Ht 1.575 m (5' 2\")   Wt 84 kg (185 lb 3 oz)   SpO2 94%   BMI 33.87 kg/m²       General appearance - alert, well appearing and in no acute distress  Mental status - oriented to person, place and time with normal affect  Head - normocephalic and atraumatic  Neck - supple, no carotid bruits, thyroid not palpable, no JVD  Chest - No oxygen. clear to auscultation, normal effort  Heart - normal rate, regular rhythm, no murmurs  Abdomen - soft, non-tender, non-distended, bowel sounds present all four quadrants, no masses  Neurological - normal speech, no focal findings or movement disorder noted, cranial nerves II through XII grossly intact  Extremities - peripheral pulses palpable, no pedal edema or calf pain with palpation  Skin - no gross lesions, rashes, or induration noted      Data:  CBC:   Recent Labs     06/02/24  1500 06/03/24  0535 06/04/24  0622   WBC 15.1* 14.6* 14.0*   HGB 12.5 12.1 11.2*    188 198     Chemistry:   Recent Labs     06/02/24  1500 06/02/24  1714 06/03/24  0535 06/04/24  0622   *  --  133* 135*   K 4.3  --  4.0 4.8   CL 96*  --  99 101   CO2 22  --  21 24   GLUCOSE 160*  --  189* 114*   BUN 22  --  24* 32*   CREATININE 1.2*  --  1.1* 1.0*   MG 2.0  --  2.1 2.2   ANIONGAP 15  --  13 10   LABGLOM 43*  --  50* 53*   CALCIUM 9.2  --  9.0 8.9   CAION  --   --  1.12* 1.16   PROBNP 10,163*  --   --   --    TROPHS 90* 88*  --   --      Hepatic:   Recent Labs     06/02/24  1500 
     Division of Vascular Surgery        Bilateral pulmonary embolism with significant clot burden, right heart strain elevated BNP into +10,000.  On supplemental oxygen.  Will plan for percutaneous thrombectomy, continue heparin drip, admit to medical team in ICU.                    Electronically signed by Yeny Tarango MD on 6/2/24 at 10:54 PM EDT      [facility] [place]  O: [phone]  C: [phone]  Email: [email or web address]   
  Pharmacy Medication Counseling Note    Apixaban counseling provided to Kathryn Gipson   Handouts provided to patient include: apixaban patient information    Counseling points included:  1. Indication for apixaban: PE   2. Explanation of apixaban (blood thinner)  3. Dose and directions, including missed doses and timing of medication  4. Side effects: bleeding/bruising  5. Potential drug interactions   A. Cytochrome P450 3A4 inhibitors or inducers, [place]'s Wort, grapefuit juice  6. Signs and symptoms of VTE and stroke reviewed  7. Contact prescriber when:   A. Changes made to other medications   B. Signs or symptoms of VTE or stroke   C. Uncontrolled bleeding or unusual bruising    Answered all medication-related questions and patient verbalized understanding.     Subha Berkowitz, Pharmacy Intern   
CLINICAL PHARMACY NOTE: MEDS TO BEDS    Total # of Prescriptions Filled: 1   The following medications were delivered to the patient:  eliquis    Additional Documentation:    
[facility]  PROGRESS NOTE    Shift date: 6/02/2024  Shift day: Sunday   Shift # 3    Room # 1021/1021-01   Name: Kathryn Gipson                Oriental orthodox: [place]anda[place]   Place of Moravian: Unknown    Referral: Routine Visit    Admit Date & Time: 6/2/2024 10:24 PM    Assessment:  Kathryn Gipson is an 89 y.o. female in the hospital because of \"Bilateral Pulmonary Embolism.\" Patient was resting in hospital bed, when  visited, with family present in room.    Intervention:   visited patient per initial rounding visits in the ED.  introduced herself to patient and learned about her symptoms. Patient shared that she will be undergoing a \"procedure in the cath lab\" to \"remove the clots in her lungs.\" Patient and family were coping well and were in good spirits at time of visit.  provided support and hospitality to patient and family. Per scanned Advance Directive from March 2023, patient's daughter, Michelle, is her primary decision maker. Patient's son, Luis, is her secondary decision maker.     Outcome:  Patient and family thanked  for visit and care.     Plan:  Chaplains will remain available to offer spiritual and emotional support as needed.     06/03/24 0136   Encounter Summary   Encounter Overview/Reason Initial Encounter   Service Provided For Patient and family together   Referral/Consult From Rounding  (ED Rounding)   Support System Children;Family members   Last Encounter  06/02/24   Complexity of Encounter Low   Begin Time 0136   End Time  0153   Total Time Calculated 17 min   Spiritual/Emotional needs   Type Spiritual Support   Assessment/Intervention/Outcome   Assessment Calm;Coping   Intervention Sustaining Presence/Ministry of presence   Outcome Receptive   Plan and Referrals   Plan/Referrals Continue to visit, (comment)  (as needed)     Electronically signed by Chaplain Pari, on 6/3/2024 at 5:21 AM.  [facility] 
Physical Therapy  Facility/Department: [facility] CAR 2- STEPDOWN  Physical Therapy Initial Assessment    Name: Kathryn Gipson  : 1934  MRN: [id number]  Date of Service: 2024    Chief Complaint   Patient presents with    Shortness of Breath       Discharge Recommendations:    Further therapy recommended at discharge.    PT Equipment Recommendations  Equipment Needed: No      Patient Diagnosis(es): The primary encounter diagnosis was Multiple subsegmental pulmonary emboli without acute cor pulmonale (HCC). Diagnoses of Pulmonary embolism, bilateral (HCC) and Pulmonary embolus (HCC) were also pertinent to this visit.  Past Medical History:  has a past medical history of Acid reflux, Atrial fibrillation (HCC), CAD (coronary artery disease), HTN (hypertension), and Hyperlipidemia.  Past Surgical History:  has a past surgical history that includes Mitral valve surgery (2016); joint replacement (Bilateral); Nasal septum surgery; bladder suspension; Hysterectomy; and Appendectomy.    Assessment   Body Structures, Functions, Activity Limitations Requiring Skilled Therapeutic Intervention: Decreased functional mobility ;Decreased balance  Assessment: Pt grossly CGA, amb 165' RW CGA  Therapy Prognosis: Good  Decision Making: Medium Complexity  Requires PT Follow-Up: Yes  Activity Tolerance  Activity Tolerance: Patient tolerated treatment well     Plan   Physical Therapy Plan  General Plan: 3-5 times per week  Current Treatment Recommendations: Strengthening, Balance training, Gait training, Functional mobility training, Stair training, Transfer training, Endurance training, Home exercise program, Safety education & training, Patient/Caregiver education & training, Equipment evaluation, education, & procurement, Therapeutic activities  Safety Devices  Type of Devices: Call light within reach, Nurse notified, Gait belt, Patient at risk for falls, All fall risk precautions in place, Left in 
Was able to locate living will and DNRCCA form in media from 2023. Patient stated she wished to remain a full code at this time, family was also present at this time of discussion and agreed to revisit changing code status if need. Vascular resident aware  
pulmonary embolism with findings suggestive of right heart strain.           Assessment:     89 year old female with extensive bilateral pulmonary emboli and right heart strain       Plan:     Continue heparin drip   NPO  Will plan for percutaneous mechanical thrombectomy of bilateral pulmonary arteries today. The risks, benefits, and alternatives to surgery were discussed with the patient and all questions were answered. Written consent was obtained and witnessed.   Post operative orders to follow         [facility] [place]

## 2024-06-05 ENCOUNTER — CARE COORDINATION (OUTPATIENT)
Dept: CARE COORDINATION | Age: 89
End: 2024-06-05

## 2024-06-05 LAB — ECHO BSA: 1.92 M2

## 2024-06-05 NOTE — CARE COORDINATION
Does patient have an Advance Directive: reviewed and current.    Medication Reconciliation:  Medication reconciliation was performed with patient,1111F entered: yes.     Remote Patient Monitoring:  Offered patient enrollment in the Remote Patient Monitoring (RPM) program for in-home monitoring: Yes, but did not enroll at this time: declined to enroll in the program because  .    Assessments:  Care Transitions 24 Hour Call    Schedule Follow Up Appointment with PCP: Completed  Do you have a copy of your discharge instructions?: Yes  Do you have all of your prescriptions and are they filled?: Yes  Have you been contacted by a [facility] Pharmacist?: No  Have you scheduled your follow up appointment?: Yes  How are you going to get to your appointment?: Car - family or friend to transport  Do you feel like you have everything you need to keep you well at home?: Yes  Care Transitions Interventions     Other Services: Declined     Specialty Service Referral: Declined              Follow Up Appointment:   Discussed follow up appointments. Patient has hospital follow up appointment scheduled within 7 days of discharge.   Future Appointments         Provider Specialty Dept Phone    6/11/2024 2:30 PM Rosio Eason APRN - CNP Family Medicine [phone]    6/13/2024 11:00 AM Moy Kessler MD Pain Management [phone]    7/25/2024 3:30 PM Yeny Tarango MD Vascular Surgery [phone]    8/6/2024 2:10 PM Eliot Turner MD Orthopedic Surgery [phone]    4/4/2025 9:45 AM Rosio Eason APRN - CNP Family Medicine [phone]            Care Transition Nurse provided contact information.  Plan for follow-up call in 6-10 days based on severity of symptoms and risk factors.  Plan for next call: symptom management-follow up on sob, chest pain, s/s of infections  follow-up appointment-f/u on HFU appts      Payton Teran RN

## 2024-06-06 DIAGNOSIS — R06.2 WHEEZING: ICD-10-CM

## 2024-06-06 DIAGNOSIS — E78.5 HYPERLIPIDEMIA, UNSPECIFIED HYPERLIPIDEMIA TYPE: ICD-10-CM

## 2024-06-06 NOTE — TELEPHONE ENCOUNTER
Last visit: 4/1/24  Last Med refill: 2/2/23  Does patient have enough medication for 72 hours: No:     Next Visit Date:  Future Appointments   Date Time Provider Department Center   6/11/2024  2:30 PM [facility]   6/27/2024 11:20 AM Moy Kessler MD [facility] [place]   7/25/2024  3:30 PM Yeny Tarango MD heartvasc [facility]   8/6/2024  2:10 PM Eliot Turner MD [facility]   4/4/2025  9:45 AM [facility]       Health Maintenance   Topic Date Due    DTaP/Tdap/Td vaccine (1 - Tdap) Never done    Respiratory Syncytial Virus (RSV) Pregnant or age 60 yrs+ (1 - 1-dose 60+ series) Never done    COVID-19 Vaccine (5 - 2023-24 season) 09/01/2023    Lipids  04/01/2025    Depression Screen  04/01/2025    Annual Wellness Visit (Medicare Advantage)  Completed    Flu vaccine  Completed    Shingles vaccine  Completed    Pneumococcal 65+ years Vaccine  Completed    Hepatitis A vaccine  Aged Out    Hepatitis B vaccine  Aged Out    Hib vaccine  Aged Out    Polio vaccine  Aged Out    Meningococcal (ACWY) vaccine  Aged Out       No results found for: \"LABA1C\"          ( goal A1C is < 7)   No components found for: \"LABMICR\"  No components found for: \"LDLCHOLESTEROL\", \"LDLCALC\"    (goal LDL is <100)   AST (U/L)   Date Value   06/02/2024 26     ALT (U/L)   Date Value   06/02/2024 15     BUN (mg/dL)   Date Value   06/04/2024 32 (H)     BP Readings from Last 3 Encounters:   06/04/24 (!) 136/50   06/02/24 (!) 155/89   04/01/24 130/82          (goal 120/80)    All Future Testing planned in [facility]  Lab Frequency Next Occurrence               Patient Active Problem List:     OA (osteoarthritis)     HTN (hypertension)     GERD (gastroesophageal reflux disease)     Postoperative atrial fibrillation (HCC)     Pulmonary embolism, bilateral (HCC)     Pulmonary embolus (HCC)

## 2024-06-07 RX ORDER — ATORVASTATIN CALCIUM 20 MG/1
20 TABLET, FILM COATED ORAL NIGHTLY
Qty: 90 TABLET | Refills: 1 | Status: SHIPPED | OUTPATIENT
Start: 2024-06-07

## 2024-06-07 RX ORDER — ALBUTEROL SULFATE 90 UG/1
AEROSOL, METERED RESPIRATORY (INHALATION)
Qty: 8.5 G | Refills: 5 | Status: SHIPPED | OUTPATIENT
Start: 2024-06-07

## 2024-06-10 ENCOUNTER — OFFICE VISIT (OUTPATIENT)
Dept: FAMILY MEDICINE CLINIC | Age: 89
End: 2024-06-10

## 2024-06-10 VITALS
DIASTOLIC BLOOD PRESSURE: 74 MMHG | SYSTOLIC BLOOD PRESSURE: 126 MMHG | OXYGEN SATURATION: 94 % | WEIGHT: 181 LBS | HEART RATE: 71 BPM | BODY MASS INDEX: 33.11 KG/M2

## 2024-06-10 DIAGNOSIS — I10 ESSENTIAL HYPERTENSION: ICD-10-CM

## 2024-06-10 DIAGNOSIS — Z09 HOSPITAL DISCHARGE FOLLOW-UP: Primary | ICD-10-CM

## 2024-06-10 DIAGNOSIS — K21.9 GASTROESOPHAGEAL REFLUX DISEASE WITHOUT ESOPHAGITIS: ICD-10-CM

## 2024-06-10 DIAGNOSIS — J44.9 CHRONIC OBSTRUCTIVE PULMONARY DISEASE, UNSPECIFIED COPD TYPE (HCC): ICD-10-CM

## 2024-06-10 RX ORDER — OMEPRAZOLE 20 MG/1
20 CAPSULE, DELAYED RELEASE ORAL DAILY
Qty: 90 CAPSULE | Refills: 1 | Status: SHIPPED | OUTPATIENT
Start: 2024-06-10

## 2024-06-10 NOTE — PROGRESS NOTES
Patient is present for f/u from [facility]  Patient was in the hospital 6/2-6/4 due to Pulmonary embolism, bilateral  
1 each 0    furosemide (LASIX) 20 MG tablet Take 2 tablets by mouth daily      aspirin 81 MG EC tablet Take 1 tablet by mouth daily      MULTIPLE VITAMIN PO Take  by mouth daily.      calcium carbonate (OSCAL) 500 MG TABS tablet Take 1 tablet by mouth daily          Medications patient taking as of now reconciled against medications ordered at time of hospital discharge: Yes    A comprehensive review of systems was negative except for what was noted in the HPI.    Objective:    /74 (Site: Left Upper Arm, Position: Sitting, Cuff Size: Large Adult)   Pulse 71   Wt 82.1 kg (181 lb)   SpO2 94%   BMI 33.11 kg/m²   General Appearance: alert and oriented to person, place and time, well developed and well- nourished, in no acute distress  Skin: warm and dry, no rash or erythema  Head: normocephalic and atraumatic  Eyes: pupils equal, round, and reactive to light, extraocular eye movements intact, conjunctivae normal  ENT: tympanic membrane, external ear and ear canal normal bilaterally, nose without deformity, nasal mucosa and turbinates normal without polyps  Neck: supple and non-tender without mass, no thyromegaly or thyroid nodules, no cervical lymphadenopathy  Pulmonary/Chest: clear to auscultation bilaterally- no wheezes, rales or rhonchi, normal air movement, no respiratory distress  Cardiovascular: normal rate, regular rhythm, normal S1 and S2, no murmurs, rubs, clicks, or gallops, distal pulses intact, no carotid bruits  Abdomen: soft, non-tender, non-distended, normal bowel sounds, no masses or organomegaly  Extremities: no cyanosis, clubbing or edema  Musculoskeletal: normal range of motion, no joint swelling, deformity or tenderness  Neurologic: reflexes normal and symmetric, no cranial nerve deficit, gait, coordination and speech normal      An electronic signature was used to authenticate this note.  --AURORA BRUMFIELD, WEI - CNP

## 2024-06-13 ENCOUNTER — CARE COORDINATION (OUTPATIENT)
Dept: CARE COORDINATION | Age: 89
End: 2024-06-13

## 2024-06-13 NOTE — CARE COORDINATION
follow-up call in 6-10 days based on severity of symptoms and risk factors.  Plan for next call: symptom management-follow up on pain, breathing sob, s/s of infection      Payton Teran RN

## 2024-06-20 ENCOUNTER — CARE COORDINATION (OUTPATIENT)
Dept: CARE COORDINATION | Age: 89
End: 2024-06-20

## 2024-06-20 NOTE — CARE COORDINATION
Care Transitions Note    Follow Up Call     Patient Current Location:  Home: [facility] [place] [id number]    [facility] Care Coordinator contacted the patient by telephone. Verified name and  as identifiers.    Additional needs identified to be addressed with provider   No needs identified                 Method of communication with provider: none.    Care Summary Note: Writer spoke to Kathryn today she returned writer's call. She denies any cardiac symptoms sob N/V/D fever or chills s.s of infection.  She stated she is doing fine today. She voiced no needs or concerns. Reminded of   Pain management appointment on 24. Thanked writer for calling.  Plan of care updates since last contact:  Review of patient management of conditions/medications: cardiac   Reminded of upcoming appointments        Advance Care Planning:   Does patient have an Advance Directive: reviewed and current.    Medication Review:  Full medication reconciliation completed during previous call.    Remote Patient Monitoring:  Offered patient enrollment in the Remote Patient Monitoring (RPM) program for in-home monitoring: Yes, but did not enroll at this time: did not discuss today .    Assessments:  Care Transitions ED Follow Up    Care Transitions Interventions     Other Services: Declined     Specialty Service Referral: Declined      Do you have any ongoing symptoms?: No   Do you have all of your prescriptions and are they filled?: Yes   Were you discharged with any Home Care or Post Acute Services or do you currently have any active services?: Yes   Post Acute Services: Home Health         Do you have any needs or concerns that I can assist you with?: No   Identified Barriers: None             Follow Up Appointment:   Reviewed upcoming appointment(s).  Future Appointments         Provider Specialty Dept Phone    2024 11:20 AM Moy Kessler MD Pain Management [phone]    2024 3:30 PM Yeny Tarango MD Vascular

## 2024-06-20 NOTE — CARE COORDINATION
Care Transitions Note    Follow Up Call     Attempted to reach patient for transitions of care follow up.  Unable to reach patient.      Outreach Attempts: # 1  Unable to leave message.     Care Summary Note: Attempt to reach pt for LUCIANA call VM not set up yet. Will try outreach another day.     Follow Up Appointment:   Future Appointments         Provider Specialty Dept Phone    6/27/2024 11:20 AM Moy Kessler MD Pain Management [phone]    7/25/2024 3:30 PM Yeny Tarango MD Vascular Surgery [phone]    7/26/2024 12:15 PM Rosio Eason APRN - CNP Family Medicine [phone]    8/6/2024 2:10 PM Eliot Turner MD Orthopedic Surgery [phone]    4/4/2025 9:45 AM Rosio Eason APRN - CNP Family Medicine [phone]            Plan for follow-up on next business day.  based on severity of symptoms and risk factors. Plan for next call:  pain symptom management s/s infection    Roseann Dupont LPN

## 2024-06-25 DIAGNOSIS — J44.9 CHRONIC OBSTRUCTIVE PULMONARY DISEASE, UNSPECIFIED COPD TYPE (HCC): ICD-10-CM

## 2024-06-27 ENCOUNTER — HOSPITAL ENCOUNTER (OUTPATIENT)
Dept: PAIN MANAGEMENT | Age: 89
Discharge: HOME | End: 2024-06-27
Payer: MEDICARE

## 2024-06-27 VITALS — BODY MASS INDEX: 33.31 KG/M2 | WEIGHT: 181 LBS | HEIGHT: 62 IN

## 2024-06-27 DIAGNOSIS — M51.36 DDD (DEGENERATIVE DISC DISEASE), LUMBAR: ICD-10-CM

## 2024-06-27 DIAGNOSIS — M47.817 LUMBOSACRAL SPONDYLOSIS WITHOUT MYELOPATHY: Primary | ICD-10-CM

## 2024-06-27 DIAGNOSIS — Z79.01 CHRONIC ANTICOAGULATION: ICD-10-CM

## 2024-06-27 PROBLEM — M51.369 DDD (DEGENERATIVE DISC DISEASE), LUMBAR: Status: ACTIVE | Noted: 2024-06-27

## 2024-06-27 PROCEDURE — 99203 OFFICE O/P NEW LOW 30 MIN: CPT

## 2024-06-27 PROCEDURE — 99204 OFFICE O/P NEW MOD 45 MIN: CPT | Performed by: ANESTHESIOLOGY

## 2024-06-27 ASSESSMENT — PAIN DESCRIPTION - DESCRIPTORS: DESCRIPTORS: ACHING

## 2024-06-27 ASSESSMENT — ENCOUNTER SYMPTOMS
VOMITING: 0
DIARRHEA: 0
ALLERGIC/IMMUNOLOGIC NEGATIVE: 1
EYES NEGATIVE: 1
SHORTNESS OF BREATH: 0
BACK PAIN: 1
EYE REDNESS: 0
CHEST TIGHTNESS: 0
NAUSEA: 0
SORE THROAT: 0
GASTROINTESTINAL NEGATIVE: 1
RESPIRATORY NEGATIVE: 1
CONSTIPATION: 0

## 2024-06-27 ASSESSMENT — PAIN SCALES - GENERAL: PAINLEVEL_OUTOF10: 0

## 2024-06-27 ASSESSMENT — PAIN DESCRIPTION - LOCATION: LOCATION: BACK

## 2024-06-27 ASSESSMENT — PAIN DESCRIPTION - PAIN TYPE: TYPE: CHRONIC PAIN

## 2024-06-27 ASSESSMENT — PAIN DESCRIPTION - FREQUENCY: FREQUENCY: INTERMITTENT

## 2024-06-27 ASSESSMENT — PAIN DESCRIPTION - ORIENTATION: ORIENTATION: LOWER

## 2024-06-27 NOTE — H&P (VIEW-ONLY)
breath.    Cardiovascular: Negative.  Negative for chest pain and palpitations.   Gastrointestinal: Negative.  Negative for constipation, diarrhea, nausea and vomiting.   Endocrine: Negative.  Negative for heat intolerance.   Genitourinary: Negative.  Negative for urgency.   Musculoskeletal:  Positive for back pain and gait problem. Negative for joint swelling.   Skin: Negative.  Negative for wound.   Allergic/Immunologic: Negative.  Negative for immunocompromised state.   Neurological:  Positive for numbness. Negative for weakness.   Hematological: Negative.  Does not bruise/bleed easily.   Psychiatric/Behavioral: Negative.  Negative for confusion.          Objective:  General Appearance:  Uncomfortable, in pain, well-appearing and in no acute distress.    Vital signs: (most recent): Height 1.575 m (5' 2\"), weight 82.1 kg (181 lb).  Vital signs are normal.  No fever.    Output: Producing urine and producing stool.    HEENT: Normal HEENT exam.    Lungs:  Normal effort and normal respiratory rate.  She is not in respiratory distress.    Heart: Normal rate.    Extremities: Normal range of motion.  There is no deformity.    Neurological: Patient is alert and oriented to person, place and time.  Normal strength.  Patient has normal coordination.    Pupils:  Pupils are equal, round, and reactive to light.  Pupils are equal.   Skin:  Warm and dry.  No rash or cyanosis.   Lumbar spine examination  No apparent deformity  Range of motion limited  Tenderness to palpation lumbar area  Facet loading positive  Gait is stable  Motor strength 5/5 in both lower extremities  Assessment & Plan  This is a very pleasant 89-year-old female accompanied today by her daughter  Complaining of chronic predominantly axial lower back pain flared up for last several months located in the lumbar area midline and paramidline region  No significant dermatomal radiation of pain  No associated numbness or paresthesia  No changes in bladder or bowel

## 2024-06-27 NOTE — PROGRESS NOTES
The patient is a 89 y.o.Non- / non  female.    Chief Complaint   Patient presents with    Back Pain    New Patient        Back Pain  Associated symptoms include numbness. Pertinent negatives include no chest pain, fever or weakness.       Pain History  Pain score today: 0 while sitting, has pain while standing walking   1. Location: Lumbar Back Pain     2. Radiation: across lower back   3. Character: aching    5. Duration: comes and goes, only comes on during activity   6. Onset: couple years   7. Did an injury cause pain: no, has fallen a few times over the years, scoliosis   8. Aggravating factors: walking, moving   9. Alleviating factors: Sitting   10. Associated symptoms (numbness / tingling / weakness): Numbness   -Where at: lumbar spine   -Down into finger tips or toes (specify which finger or toes): n/a   -constant or intermitting:  n/a   11. Red Flags: (weight loss / chills / loss of bladder or bowel control): n/a      Previous management history  1. Previous diagnostic workup: (Imaging/EMG)   CT, MRI, or Xray: MRI, XR  What part of the body: Lumbar Spine   What facility did they have it at: [facility]   What year or specific date: 04/01/2024  EMG:  no      2. Previous non interventional treatments tried:  chiropractor or physical therapy: PT   What part of the body:  Ankle   What facility was it done at: [facility]   How long ago was it last tried: 01/04/2023  Did it work: yes   Did they complete it: yes      3. Previous Medications tried  NSAID's: No   Neurontin: no   Lyrica: no   Trycyclic antidepressant (Ellavil / Pamelor ): no   Cymbalta: no   Opioids (Ultram / Vicodin / Percocet / Morphine / Dilaudid / Oramorph/ Fentanyl etc.): no  Last Pain medication taken (name of med and date): nothing      4. Previous Interventional pain procedures tried:  What kind of injection: no   Who did the injection: n/a  did the injection help:  n/a   Last time injection was done: n/a      5. Previous surgeries for

## 2024-06-28 ENCOUNTER — CARE COORDINATION (OUTPATIENT)
Dept: CARE COORDINATION | Age: 89
End: 2024-06-28

## 2024-06-28 RX ORDER — TRAMADOL HYDROCHLORIDE 50 MG/1
50 TABLET ORAL EVERY 6 HOURS PRN
Qty: 14 TABLET | Refills: 0 | Status: SHIPPED | OUTPATIENT
Start: 2024-06-28 | End: 2024-07-05

## 2024-06-28 NOTE — CARE COORDINATION
Care Transitions Note    Follow Up Call     Patient Current Location:  Home: [address] [place] [id number]    Care Transition Nurse contacted the patient by telephone. Verified name and  as identifiers.    Additional needs identified to be addressed with provider   No needs identified                 Method of communication with provider: none.    Care Summary Note: Writer spoke to patient, she is doing ok, went to pain management yesterday, doctor prescribed her tramadol for the pain, going to see if that helps, denied any c/o fever, chills, n/v/d, sob or chest pain, will continue to follow//JU    Plan of care updates since last contact:  Review of patient management of conditions/medications:         Advance Care Planning:   Does patient have an Advance Directive: reviewed during previous call, see note. .    Medication Review:  Medications changed since last call, reviewed today.     Remote Patient Monitoring:  Offered patient enrollment in the Remote Patient Monitoring (RPM) program for in-home monitoring: Yes, but did not enroll at this time:   .    Assessments:   Goals Addressed    None          Follow Up Appointment:   Reviewed upcoming appointment(s).  Future Appointments         Provider Specialty Dept Phone    2024 3:30 PM Yeny Tarango MD Vascular Surgery [phone]    2024 12:15 PM Rosio Eason, WEI - CNP Family Medicine [phone]    2024 2:10 PM Eliot Turner MD Orthopedic Surgery [phone]    2024 11:30 AM (Arrive by 10:30 AM) Moy Kessler MD Pain Management [phone]    2025 9:45 AM Rosio Eason APRN - CNP Family Medicine [phone]            Care Transition Nurse provided contact information.  Plan for follow-up call in 6-10 days based on severity of symptoms and risk factors.  Plan for next call: symptom management-follow up on pain, new medications      Payton Teran RN

## 2024-07-09 ENCOUNTER — CARE COORDINATION (OUTPATIENT)
Dept: CARE COORDINATION | Age: 89
End: 2024-07-09

## 2024-07-09 NOTE — CARE COORDINATION
Care Transitions Note    Final Call     Patient Current Location:  Home: [address] [place], [place] [id number]    [facility] Care Coordinator contacted the patient by telephone. Verified name and  as identifiers.    Patient graduated from the Care Transitions program on 2024.  Patient/family has the ability to self manage at this time..      Advance Care Planning:   Does patient have an Advance Directive: reviewed and current.    Handoff:   Patient was not referred to the ACM team due to no additional needs identified.       Care Summary Note: Writer spoke with Kathryn for a follow up care transitions call. She states she is doing good. She feels she is back to her baseline breathing. She denies having any SOB,cough,chest pain or palpitations. She continues with chronic back pain and has her pain management follow up on 2024. She states she only took a couple of the Tramadol and didn't like the way it made her feel so she stopped it. She will discuss other options for managing her pain at her upcoming appointment. No further needs identified at this time-will end care transitions.     Assessments:  Care Transitions Subsequent and Final Call    Subsequent and Final Calls  Do you have any ongoing symptoms?: No  Have your medications changed?: No  Do you have any questions related to your medications?: No  Do you currently have any active services?: No  Are you currently active with any services?: Home Health  Do you have any needs or concerns that I can assist you with?: No  Identified Barriers: None  Care Transitions Interventions     Other Services: Declined     Specialty Service Referral: Declined    Other Interventions:              Upcoming Appointments:    Future Appointments         Provider Specialty Dept Phone    2024 10:30 AM (Arrive by 9:30 AM) Moy Kessler MD Pain Management [phone]    2024 3:30 PM Yeny Tarango MD Vascular Surgery [phone]    2024 12:15 PM

## 2024-07-19 ENCOUNTER — HOSPITAL ENCOUNTER (OUTPATIENT)
Dept: PAIN MANAGEMENT | Facility: CLINIC | Age: 89
Discharge: HOME | End: 2024-07-19
Payer: MEDICARE

## 2024-07-19 VITALS
RESPIRATION RATE: 11 BRPM | TEMPERATURE: 97 F | DIASTOLIC BLOOD PRESSURE: 78 MMHG | HEIGHT: 62 IN | SYSTOLIC BLOOD PRESSURE: 178 MMHG | WEIGHT: 181 LBS | OXYGEN SATURATION: 97 % | BODY MASS INDEX: 33.31 KG/M2 | HEART RATE: 61 BPM

## 2024-07-19 DIAGNOSIS — R52 PAIN MANAGEMENT: ICD-10-CM

## 2024-07-19 DIAGNOSIS — M47.817 LUMBOSACRAL SPONDYLOSIS WITHOUT MYELOPATHY: Primary | ICD-10-CM

## 2024-07-19 PROCEDURE — 6360000002 HC RX W HCPCS: Performed by: ANESTHESIOLOGY

## 2024-07-19 PROCEDURE — 64493 INJ PARAVERT F JNT L/S 1 LEV: CPT

## 2024-07-19 PROCEDURE — 6360000004 HC RX CONTRAST MEDICATION: Performed by: ANESTHESIOLOGY

## 2024-07-19 PROCEDURE — 2500000003 HC RX 250 WO HCPCS: Performed by: ANESTHESIOLOGY

## 2024-07-19 PROCEDURE — 64494 INJ PARAVERT F JNT L/S 2 LEV: CPT

## 2024-07-19 RX ORDER — BUPIVACAINE HYDROCHLORIDE 5 MG/ML
INJECTION, SOLUTION EPIDURAL; INTRACAUDAL
Status: COMPLETED | OUTPATIENT
Start: 2024-07-19 | End: 2024-07-19

## 2024-07-19 RX ORDER — FENTANYL CITRATE 50 UG/ML
INJECTION, SOLUTION INTRAMUSCULAR; INTRAVENOUS
Status: COMPLETED | OUTPATIENT
Start: 2024-07-19 | End: 2024-07-19

## 2024-07-19 RX ORDER — LIDOCAINE HYDROCHLORIDE 10 MG/ML
INJECTION, SOLUTION EPIDURAL; INFILTRATION; INTRACAUDAL; PERINEURAL
Status: COMPLETED | OUTPATIENT
Start: 2024-07-19 | End: 2024-07-19

## 2024-07-19 RX ADMIN — FENTANYL CITRATE 25 MCG: 50 INJECTION, SOLUTION INTRAMUSCULAR; INTRAVENOUS at 11:18

## 2024-07-19 RX ADMIN — IOHEXOL 3 ML: 180 INJECTION INTRAVENOUS at 11:21

## 2024-07-19 RX ADMIN — BUPIVACAINE HYDROCHLORIDE 6 ML: 5 INJECTION, SOLUTION EPIDURAL; INTRACAUDAL; PERINEURAL at 11:21

## 2024-07-19 RX ADMIN — LIDOCAINE HYDROCHLORIDE 5 ML: 10 INJECTION, SOLUTION EPIDURAL; INFILTRATION; INTRACAUDAL at 11:18

## 2024-07-19 ASSESSMENT — PAIN - FUNCTIONAL ASSESSMENT
PAIN_FUNCTIONAL_ASSESSMENT: 0-10
PAIN_FUNCTIONAL_ASSESSMENT: PREVENTS OR INTERFERES SOME ACTIVE ACTIVITIES AND ADLS

## 2024-07-19 ASSESSMENT — PAIN DESCRIPTION - DESCRIPTORS: DESCRIPTORS: ACHING

## 2024-07-19 NOTE — OP NOTE
Patient Name: Kathryn Gipson   YOB: 1934  Room/Bed: Room/bed info not found  Medical Record Number: [id number]  Date: 7/19/2024       Sedation/ Anesthesia Plan:   intravenous sedation   as needed.    Medications Planned:   midazolam (Versed) / Fentanyl  Intravenously  as needed.    Preoperative Diagnosis: Lumbar spondylosis w/o myelopathy or radiculopathy  Postoperative Diagnosis: Lumbar spondylosis w/o myelopathy or radiculopathy  Blood Loss: none    Procedure Performed:  Bilateral Lumbar Medial Branch nerve Blocks at the transverse processes of  L4, L5 and sacral  ala under fluoroscopy guidance    Procedure:      The Patient was seen in the preop area, chart was reviewed, informed consent was obtained. Patient was taken to procedure room and was placed in prone position. Vital signs were monitored through out the  Procedure. A time out was completed.  The skin over the back was prepped and draped in sterile manner.     The target point was marked at the junction of Transverse process and superior articular process at the target levels.  Skin and deep tissues were anesthetized with 1 % lidocaine. A 25-gauge needlele was advanced to the target spots under fluoroscopy guidance in AP / Lateral and Oblique views.     Then after negative aspiration contrast dye was injected with live fluoroscopy in AP views that showed  spread of the contrast with no epidural space and no vascular runoff or intrathecal spread.    Finally 0.5 ml of treatment solution 0.5% bupivacaine  was injected at each level.  The needle was removed and a Band-Aid was placed over the needle  insertion site.  The patient's vital signs remained stable and the patient tolerated the procedure well.      Electronically signed by Moy Kessler MD on 7/19/2024 at 11:26 AM    SEDATION NOTE:    ASA CLASSIFICATION  2  MP   CLASSIFICATION  2    Moderate intravenous conscious sedation was supervised by Dr. Kessler  The patient was independently

## 2024-07-19 NOTE — DISCHARGE INSTRUCTIONS
Discharge Instructions following Sedation or Anesthesia:  You have  received  a sedative/anesthetic therefore, you should not consume any alcoholic beverages for minimum of 12 hours.  Do not drive or operate machinery for 24 hours.  Do not sign legal documents for 24 hours.  Dizziness, drowsiness, and unsteadiness may occur.  Rest when need to.  Increase diet as tolerated.  Keep up on fluids if diet allows.      General Instructions:  Do not take a tub bath for 72 hours after procedure (this includes hot tubs and swimming pools).  You may shower, but avoid hot water to injection site.   Avoid strenuous activity TODAY especially if you experience dizziness.   Remove band-aid the next day.  Wash off any residual iodine   Do not use heat, heating pad, or any other heating device over the injection site for 3 days after the procedure.  If you experience pain after your procedure, you may continue with your current pain medication as prescribed.  (DO NOT INCREASE YOUR PAIN MEDICATION WITHOUT TALKING TO DOCTOR)  Soreness and pain at injection site is common, may use ice to reduce soreness.    Please complete pain diary as instructed.     Call [facility] at [phone] if you experience:   Fever, chills or temperature over 100    Vomiting, Headache, persistent stiff neck, nausea, blurred vision   Difficulty in urinating or unable to urinate with 8 hours   Increase in weakness, numbness or loss of function   Increased redness, swelling or drainage at the injection site

## 2024-07-19 NOTE — INTERVAL H&P NOTE
Update History & Physical    The patient's History and Physical of June 27, 2024 was reviewed with the patient and I examined the patient. There was no change. The surgical site was confirmed by the patient and me.     Plan: The risks, benefits, expected outcome, and alternative to the recommended procedure have been discussed with the patient. Patient understands and wants to proceed with the procedure.   ASA 2  MP 2      Electronically signed by Moy Kessler MD on 7/19/2024 at 11:05 AM

## 2024-07-23 ENCOUNTER — TELEPHONE (OUTPATIENT)
Dept: PAIN MANAGEMENT | Age: 89
End: 2024-07-23

## 2024-07-23 DIAGNOSIS — M47.817 LUMBOSACRAL SPONDYLOSIS WITHOUT MYELOPATHY: Primary | ICD-10-CM

## 2024-07-23 NOTE — TELEPHONE ENCOUNTER
Procedure: Bilateral Lumbar Medial Branch nerve Blocks at the transverse processes of  L4, L5 and sacral  ala under fluoroscopy guidance   DOS: [id number]  Pain level before procedure with activity 8.  Pain with activity after procedure 0.  What activities done the day of procedure cleaning  What percentage of  pain relief from procedure did you receive 100  Success Y  OR Scheduled  8/16

## 2024-07-25 ENCOUNTER — OFFICE VISIT (OUTPATIENT)
Dept: VASCULAR SURGERY | Age: 89
End: 2024-07-25
Payer: MEDICARE

## 2024-07-25 VITALS
HEART RATE: 64 BPM | SYSTOLIC BLOOD PRESSURE: 168 MMHG | DIASTOLIC BLOOD PRESSURE: 84 MMHG | OXYGEN SATURATION: 97 % | HEIGHT: 62 IN | BODY MASS INDEX: 33.31 KG/M2 | RESPIRATION RATE: 18 BRPM | WEIGHT: 181 LBS

## 2024-07-25 DIAGNOSIS — I26.09 OTHER ACUTE PULMONARY EMBOLISM WITH ACUTE COR PULMONALE (HCC): ICD-10-CM

## 2024-07-25 DIAGNOSIS — I26.99 PULMONARY EMBOLISM, BILATERAL (HCC): Primary | ICD-10-CM

## 2024-07-25 PROCEDURE — 1123F ACP DISCUSS/DSCN MKR DOCD: CPT | Performed by: SURGERY

## 2024-07-25 PROCEDURE — 99213 OFFICE O/P EST LOW 20 MIN: CPT | Performed by: SURGERY

## 2024-07-25 NOTE — PROGRESS NOTES
Division of Vascular Surgery        Follow Up    Percutaneous mechanical thrombectomy of bilateral pulmonary arteries (6/3/24)     Chief Complaint:      Follow up     History of Present Illness:      Kathryn Gipson is a 89 y.o. woman who presents for follow up after undergoing percutaneous mechanical thrombectomy for bilateral pulmonary artery embolism with right heart strain.  She is doing very well after her procedure, had a great 89th birthday a few days after being discharged (my daughter and her share the same birthday).  She denies the shortness of breath and fatigue she was experiencing before intervention.  She has been staying active, goes to the [facility] several times a week.  She is not on supplemental oxygen, no dyspnea with exertion, no chest pain.  [facility] site is without any issues.  She has been tolerating her anticoagulation without any bleeding issues.    (6/2/24) Bilateral pulmonary embolism with significant clot burden, right heart strain elevated BNP into +10,000. Patient with extensive bilateral pulmonary artery embolism with right heart strain, worsening shortness of breath for the last few days. Had near syncopal episode today after using inhaler. Hemodynamically stable at this time but is easily fatigued and short of breath with just walking a few steps. Started on anticoagulation, will plan for percutaneous mechanical thrombectomy given her symptoms and significant clot burden. Risks and benefits discussed with her and her daughter, including bleeding, respiratory failure, death and inability to remove thrombus. Questions answered and she consents to proceed. Will plan to do this in the morning given another emergency this evening. Admit to ICU under my service for now.     Medical History:     Past Medical History:   Diagnosis Date    Acid reflux     Atrial fibrillation (HCC)     CAD (coronary artery disease)     HTN (hypertension)     Hyperlipidemia        Surgical History:     Past

## 2024-07-26 ENCOUNTER — OFFICE VISIT (OUTPATIENT)
Dept: FAMILY MEDICINE CLINIC | Age: 89
End: 2024-07-26
Payer: MEDICARE

## 2024-07-26 VITALS
WEIGHT: 179 LBS | BODY MASS INDEX: 32.74 KG/M2 | DIASTOLIC BLOOD PRESSURE: 70 MMHG | SYSTOLIC BLOOD PRESSURE: 120 MMHG | OXYGEN SATURATION: 95 % | HEART RATE: 73 BPM

## 2024-07-26 DIAGNOSIS — I10 PRIMARY HYPERTENSION: Primary | ICD-10-CM

## 2024-07-26 DIAGNOSIS — J43.9 PULMONARY EMPHYSEMA, UNSPECIFIED EMPHYSEMA TYPE (HCC): ICD-10-CM

## 2024-07-26 PROCEDURE — 99214 OFFICE O/P EST MOD 30 MIN: CPT | Performed by: NURSE PRACTITIONER

## 2024-07-26 PROCEDURE — 1123F ACP DISCUSS/DSCN MKR DOCD: CPT | Performed by: NURSE PRACTITIONER

## 2024-07-26 NOTE — PROGRESS NOTES
Patient is present for 1 month f/u for COPD  Patient states she restarted the stiolto  States it has been helping

## 2024-07-26 NOTE — PROGRESS NOTES
Kathryn Gipson (:  1934) is a 89 y.o. female,Established patient, here for evaluation of the following chief complaint(s):  COPD and 1 Month Follow-Up      Assessment & Plan   ASSESSMENT/PLAN:  1. Primary hypertension  2. Pulmonary emphysema, unspecified emphysema type (HCC)    Problems stable continue current medications.     No follow-ups on file.         Subjective   SUBJECTIVE/OBJECTIVE:  COPD  There is no cough or shortness of breath. Pertinent negatives include no chest pain or fever.     Saw vascular yesterday, no new concerns.   Bp is great.   Working with pain management for back pain, going to get nerves burned.   She is feeling good  Has no concerns.     Review of Systems   Constitutional:  Negative for chills and fever.   Respiratory:  Negative for cough and shortness of breath.    Cardiovascular:  Negative for chest pain, palpitations and leg swelling.          Objective   Vitals:    24 1207   BP: 120/70   Pulse: 73   SpO2: 95%     Physical Exam  Constitutional:       Appearance: She is well-developed.   HENT:      Right Ear: External ear normal.      Left Ear: External ear normal.      Nose: Nose normal.   Cardiovascular:      Rate and Rhythm: Normal rate and regular rhythm.      Heart sounds: Normal heart sounds, S1 normal and S2 normal.   Pulmonary:      Effort: Pulmonary effort is normal. No respiratory distress.      Breath sounds: Normal breath sounds.   Musculoskeletal:         General: No deformity. Normal range of motion.      Cervical back: Full passive range of motion without pain and normal range of motion.   Skin:     General: Skin is warm and dry.   Neurological:      Mental Status: She is alert and oriented to person, place, and time.       An electronic signature was used to authenticate this note.    --AURORA BRUMFIELD, WEI - CNP

## 2024-08-06 ENCOUNTER — OFFICE VISIT (OUTPATIENT)
Dept: ORTHOPEDIC SURGERY | Age: 89
End: 2024-08-06
Payer: MEDICARE

## 2024-08-06 VITALS — HEIGHT: 62 IN | BODY MASS INDEX: 32.94 KG/M2 | WEIGHT: 179 LBS | RESPIRATION RATE: 14 BRPM

## 2024-08-06 DIAGNOSIS — M41.50 DEGENERATIVE SCOLIOSIS IN ADULT PATIENT: Primary | ICD-10-CM

## 2024-08-06 DIAGNOSIS — M48.062 LUMBAR STENOSIS WITH NEUROGENIC CLAUDICATION: ICD-10-CM

## 2024-08-06 PROBLEM — J44.9 CHRONIC OBSTRUCTIVE PULMONARY DISEASE, UNSPECIFIED (HCC): Status: ACTIVE | Noted: 2024-08-06

## 2024-08-06 PROCEDURE — 99213 OFFICE O/P EST LOW 20 MIN: CPT | Performed by: ORTHOPAEDIC SURGERY

## 2024-08-06 PROCEDURE — 1123F ACP DISCUSS/DSCN MKR DOCD: CPT | Performed by: ORTHOPAEDIC SURGERY

## 2024-08-06 ASSESSMENT — ENCOUNTER SYMPTOMS
SHORTNESS OF BREATH: 0
CHEST TIGHTNESS: 0
COLOR CHANGE: 0
ALLERGIC/IMMUNOLOGIC NEGATIVE: 1
ABDOMINAL PAIN: 0

## 2024-08-06 NOTE — PROGRESS NOTES
Patient ID: Kathryn Gipson is a 89 y.o. female    Chief Compliant:  No chief complaint on file.       Diagnostic imaging:  MRI lumbar spine again reviewed patient with multilevel lumbar spondylosis degenerative scoliosis there is some stenosis greatest at L4-5      Assessment and Plan:  1. Degenerative scoliosis in adult patient    2. Lumbar stenosis with neurogenic claudication        Axial midline low back pain with radicular buttock pain    Refer to pain management for lumbar epidural steroid injections    Sounds like the patient had some medial branch blocks with good temporary outcome 100% for a while and at least 50% for the rest the day    Follow up 3 months    HPI:  This is a 89 y.o. female who presents to the clinic today for follow up chronic axial midline low back pain with radicular buttock pain.     Patient completed pain management once, provided relief for a day. She would like to continue following with them.    She notes she goes to the [facility] for senior yoga 4 times a week    Patient was seen in the ED at [facility] due to shortness of breath on 6/2/2024.    Review of Systems   All other systems reviewed and are negative.      Past History:    Current Outpatient Medications:     tiotropium-olodaterol (STIOLTO RESPIMAT) 2.5-2.5 MCG/ACT AERS, Inhale 2 puffs into the lungs daily, Disp: 3 each, Rfl: 1    apixaban (ELIQUIS) 5 MG TABS tablet, Take 1 tablet by mouth 2 times daily (Patient taking differently: Take 0.5 tablets by mouth 2 times daily Taking 2.5mg twice a day), Disp: 180 tablet, Rfl: 2    metoprolol tartrate (LOPRESSOR) 25 MG tablet, take 1 tablet by mouth twice a day, Disp: 180 tablet, Rfl: 1    omeprazole (PRILOSEC) 20 MG delayed release capsule, Take 1 capsule by mouth daily, Disp: 90 capsule, Rfl: 1    albuterol sulfate HFA (PROVENTIL;VENTOLIN;PROAIR) 108 (90 Base) MCG/ACT inhaler, inhale 2 puffs by mouth and INTO THE LUNGS every 6 hours if needed for wheezing, Disp: 8.5 g, Rfl: 5

## 2024-08-08 ENCOUNTER — HOSPITAL ENCOUNTER (OUTPATIENT)
Age: 89
Setting detail: SPECIMEN
Discharge: HOME | End: 2024-08-08
Payer: MEDICARE

## 2024-08-08 ENCOUNTER — INITIAL CONSULT (OUTPATIENT)
Dept: ONCOLOGY | Age: 89
End: 2024-08-08
Payer: MEDICARE

## 2024-08-08 ENCOUNTER — TELEPHONE (OUTPATIENT)
Dept: ONCOLOGY | Age: 89
End: 2024-08-08

## 2024-08-08 VITALS
HEIGHT: 62 IN | WEIGHT: 182.2 LBS | DIASTOLIC BLOOD PRESSURE: 84 MMHG | RESPIRATION RATE: 16 BRPM | BODY MASS INDEX: 33.53 KG/M2 | HEART RATE: 60 BPM | TEMPERATURE: 97.8 F | SYSTOLIC BLOOD PRESSURE: 152 MMHG

## 2024-08-08 DIAGNOSIS — R79.89 OTHER SPECIFIED ABNORMAL FINDINGS OF BLOOD CHEMISTRY: ICD-10-CM

## 2024-08-08 DIAGNOSIS — I26.99 BILATERAL PULMONARY EMBOLISM (HCC): Primary | ICD-10-CM

## 2024-08-08 DIAGNOSIS — Z12.9 SCREENING FOR MALIGNANT NEOPLASM: ICD-10-CM

## 2024-08-08 DIAGNOSIS — R97.8 OTHER ABNORMAL TUMOR MARKERS: ICD-10-CM

## 2024-08-08 DIAGNOSIS — I26.99 BILATERAL PULMONARY EMBOLISM (HCC): ICD-10-CM

## 2024-08-08 LAB
AFP SERPL-MCNC: 3.2 UG/L
CANCER AG125 SERPL-ACNC: 7 U/ML (ref 0–38)
CANCER AG19-9 SERPL IA-ACNC: 10 U/ML (ref 0–35)
CEA SERPL-MCNC: 4.1 NG/ML (ref 0–3.8)
FREE KAPPA/LAMBDA RATIO: 1.23 (ref 0.22–1.74)
IGA SERPL-MCNC: 99 MG/DL (ref 70–400)
IGG SERPL-MCNC: 920 MG/DL (ref 700–1600)
IGM SERPL-MCNC: 203 MG/DL (ref 40–230)
KAPPA LC FREE SER-MCNC: 30.4 MG/L
LAMBDA LC FREE SERPL-MCNC: 24.7 MG/L (ref 4.2–27.7)

## 2024-08-08 PROCEDURE — 99202 OFFICE O/P NEW SF 15 MIN: CPT | Performed by: INTERNAL MEDICINE

## 2024-08-08 PROCEDURE — 86334 IMMUNOFIX E-PHORESIS SERUM: CPT

## 2024-08-08 PROCEDURE — 86301 IMMUNOASSAY TUMOR CA 19-9: CPT

## 2024-08-08 PROCEDURE — 83521 IG LIGHT CHAINS FREE EACH: CPT

## 2024-08-08 PROCEDURE — 99205 OFFICE O/P NEW HI 60 MIN: CPT | Performed by: INTERNAL MEDICINE

## 2024-08-08 PROCEDURE — 82105 ALPHA-FETOPROTEIN SERUM: CPT

## 2024-08-08 PROCEDURE — 36415 COLL VENOUS BLD VENIPUNCTURE: CPT

## 2024-08-08 PROCEDURE — 86304 IMMUNOASSAY TUMOR CA 125: CPT

## 2024-08-08 PROCEDURE — 82378 CARCINOEMBRYONIC ANTIGEN: CPT

## 2024-08-08 PROCEDURE — 82784 ASSAY IGA/IGD/IGG/IGM EACH: CPT

## 2024-08-08 NOTE — PROGRESS NOTES
tobacco. She reports that she does not drink alcohol and does not use drugs.    REVIEW OF SYSTEMS:     General: Positive for weakness and fatigue. No unanticipated weight loss or decreased appetite. No fever or chills.   Eyes: No blurred vision, eye pain or double vision.   Ears: No hearing problems or drainage. No tinnitus.   Throat: No sore throat, problems with swallowing or dysphagia.   Respiratory: As above.  Cardiovascular: No chest pain, orthopnea or PND. No lower extremity edema. No palpitation.   Gastrointestinal: No problems with swallowing. No abdominal pain or bloating. No nausea or vomiting. No diarrhea or constipation. No GI bleeding.   Genitourinary: No dysuria, hematuria, frequency or urgency.     Musculoskeletal: No muscle aches or pains. No limitation of movement. No back pain. No gait disturbance, No joint complaints.  Dermatologic: No skin rashes or pruritus. No skin lesions or discolorations.   Psychiatric: No depression, anxiety, or stress or signs of schizophrenia. No change in mood or affect.    Hematologic: No history of bleeding tendency. No bruises or ecchymosis. No history of clotting problems.  Infectious disease: No fever, chills or frequent infections.   Endocrine: No polydipsia or polyuria. No temperature intolerance.  Neurologic: No headaches or dizziness. No weakness or numbness of the extremities. No changes in balance, coordination,  memory, mentation, behavior.   Allergic/Immunologic: No nasal congestion or hives. No repeated infections.       PHYSICAL EXAM:  The patient is not in acute distress.  Vital signs: Blood pressure (!) 152/84, pulse 60, temperature 97.8 °F (36.6 °C), temperature source Temporal, resp. rate 16, height 1.575 m (5' 2\"), weight 82.6 kg (182 lb 3.2 oz).     General appearance - well appearing, not in pain or distress  Mental status - good mood, alert and oriented  Eyes - pupils equal and reactive, extraocular eye movements intact  Ears - bilateral TM's and

## 2024-08-08 NOTE — TELEPHONE ENCOUNTER
FABIAN HERE FOR CONSULTATION  Labs soon  RV or virtual visit after tests results  LABS DONE ON EXIT  MD VISIT 8/15/24 @ 11AM  AVS PRINTED W/ INSTRUCTIONS AND GIVEN  TO PT ON EXIT

## 2024-08-09 LAB
ITYP INTERPRETATION: NORMAL
PATH REV: NORMAL

## 2024-08-15 ENCOUNTER — TELEPHONE (OUTPATIENT)
Dept: ONCOLOGY | Age: 89
End: 2024-08-15

## 2024-08-15 ENCOUNTER — OFFICE VISIT (OUTPATIENT)
Dept: ONCOLOGY | Age: 89
End: 2024-08-15
Payer: MEDICARE

## 2024-08-15 VITALS
SYSTOLIC BLOOD PRESSURE: 170 MMHG | WEIGHT: 179.9 LBS | BODY MASS INDEX: 32.9 KG/M2 | HEART RATE: 52 BPM | DIASTOLIC BLOOD PRESSURE: 79 MMHG | RESPIRATION RATE: 16 BRPM | TEMPERATURE: 98.3 F

## 2024-08-15 DIAGNOSIS — I26.99 BILATERAL PULMONARY EMBOLISM (HCC): Primary | ICD-10-CM

## 2024-08-15 PROCEDURE — 99211 OFF/OP EST MAY X REQ PHY/QHP: CPT

## 2024-08-15 PROCEDURE — 99214 OFFICE O/P EST MOD 30 MIN: CPT | Performed by: INTERNAL MEDICINE

## 2024-08-15 PROCEDURE — 1123F ACP DISCUSS/DSCN MKR DOCD: CPT | Performed by: INTERNAL MEDICINE

## 2024-08-15 NOTE — TELEPHONE ENCOUNTER
FABIAN HERE FOR MD VISIT  RV PRN  MD VISIT PRN  AVS PRINTED W/ INSTRUCTIONS AND GIVEN TO PT ON EXIT

## 2024-08-15 NOTE — PROGRESS NOTES
_      Chief Complaint   Patient presents with    Follow-up    Discuss Labs     DIAGNOSIS:        Bilateral pulmonary embolism with right heart strain, status post embolectomy  Advanced age  Multiple comorbidities as listed   CURRENT THERAPY:         Eliquis for PE  BRIEF CASE HISTORY:      Ms. Kathryn Gipson is a very pleasant 89 y.o. female with history of multiple co morbidities as listed.  Patient is referred for evaluation and further management of bilateral pulmonary embolism with possible hypercoagulability.  Patient is an active 89 years old lady will continue to be active and even working in the garden and minimal limitations.  She had syncopal episode and she was found to have hypoxia and found to have bilateral pulmonary embolism with right heart strain.  Patient had embolectomy and started on anticoagulation.  Clinically she did very well.  Recently Eliquis was dropped to 2.5 mg twice daily.  Tolerated well with no side effects.  No active bleeding.  Patient denies any chest pain or shortness of breath.  No hemoptysis.  No leg pain or swelling.  Had no past history of DVT or PE.  No history of TIA or CVA.  No cardiac events in the past.  No family history of thrombosis or embolization..       INTERIM HISTORY:   Patient seen for follow-up PE after lab workup to rule out malignancy.  Patient is clinically stable.  Continues on Eliquis 2.5 mg twice daily.  No complications.  No side effects related to treatment.  She denies any chest pain or shortness of breath.  No leg pain or swelling.  No other complaints.  PAST MEDICAL HISTORY: has a past medical history of Acid reflux, Atrial fibrillation (HCC), CAD (coronary artery disease), HTN (hypertension), and Hyperlipidemia.    PAST SURGICAL HISTORY: has a past surgical history that includes Mitral valve surgery (03/14/2016); joint replacement (Bilateral); Nasal septum

## 2024-08-16 ENCOUNTER — HOSPITAL ENCOUNTER (OUTPATIENT)
Dept: PAIN MANAGEMENT | Facility: CLINIC | Age: 89
Discharge: HOME | End: 2024-08-16
Payer: MEDICARE

## 2024-08-16 VITALS
HEIGHT: 62 IN | BODY MASS INDEX: 32.94 KG/M2 | OXYGEN SATURATION: 94 % | SYSTOLIC BLOOD PRESSURE: 157 MMHG | WEIGHT: 179 LBS | TEMPERATURE: 98.6 F | DIASTOLIC BLOOD PRESSURE: 86 MMHG | RESPIRATION RATE: 10 BRPM | HEART RATE: 59 BPM

## 2024-08-16 DIAGNOSIS — M47.817 LUMBOSACRAL SPONDYLOSIS WITHOUT MYELOPATHY: Primary | ICD-10-CM

## 2024-08-16 DIAGNOSIS — R52 PAIN MANAGEMENT: ICD-10-CM

## 2024-08-16 PROCEDURE — 6360000002 HC RX W HCPCS: Performed by: ANESTHESIOLOGY

## 2024-08-16 PROCEDURE — 2500000003 HC RX 250 WO HCPCS: Performed by: ANESTHESIOLOGY

## 2024-08-16 PROCEDURE — 6360000004 HC RX CONTRAST MEDICATION: Performed by: ANESTHESIOLOGY

## 2024-08-16 PROCEDURE — 64493 INJ PARAVERT F JNT L/S 1 LEV: CPT

## 2024-08-16 PROCEDURE — 64494 INJ PARAVERT F JNT L/S 2 LEV: CPT

## 2024-08-16 RX ORDER — MIDAZOLAM HYDROCHLORIDE 2 MG/2ML
INJECTION, SOLUTION INTRAMUSCULAR; INTRAVENOUS
Status: COMPLETED | OUTPATIENT
Start: 2024-08-16 | End: 2024-08-16

## 2024-08-16 RX ORDER — BUPIVACAINE HYDROCHLORIDE 5 MG/ML
INJECTION, SOLUTION EPIDURAL; INTRACAUDAL
Status: COMPLETED | OUTPATIENT
Start: 2024-08-16 | End: 2024-08-16

## 2024-08-16 RX ORDER — LIDOCAINE HYDROCHLORIDE 10 MG/ML
INJECTION, SOLUTION EPIDURAL; INFILTRATION; INTRACAUDAL; PERINEURAL
Status: COMPLETED | OUTPATIENT
Start: 2024-08-16 | End: 2024-08-16

## 2024-08-16 RX ORDER — FENTANYL CITRATE 50 UG/ML
INJECTION, SOLUTION INTRAMUSCULAR; INTRAVENOUS
Status: COMPLETED | OUTPATIENT
Start: 2024-08-16 | End: 2024-08-16

## 2024-08-16 RX ADMIN — MIDAZOLAM HYDROCHLORIDE 1 MG: 1 INJECTION, SOLUTION INTRAMUSCULAR; INTRAVENOUS at 12:24

## 2024-08-16 RX ADMIN — LIDOCAINE HYDROCHLORIDE 5 ML: 10 INJECTION, SOLUTION EPIDURAL; INFILTRATION; INTRACAUDAL at 12:24

## 2024-08-16 RX ADMIN — FENTANYL CITRATE 50 MCG: 50 INJECTION, SOLUTION INTRAMUSCULAR; INTRAVENOUS at 12:24

## 2024-08-16 RX ADMIN — IOHEXOL 3 ML: 180 INJECTION INTRAVENOUS at 12:27

## 2024-08-16 RX ADMIN — BUPIVACAINE HYDROCHLORIDE 6 ML: 5 INJECTION, SOLUTION EPIDURAL; INTRACAUDAL; PERINEURAL at 12:28

## 2024-08-16 ASSESSMENT — PAIN DESCRIPTION - DESCRIPTORS: DESCRIPTORS: ACHING

## 2024-08-16 ASSESSMENT — PAIN - FUNCTIONAL ASSESSMENT
PAIN_FUNCTIONAL_ASSESSMENT: 0-10
PAIN_FUNCTIONAL_ASSESSMENT: PREVENTS OR INTERFERES WITH ALL ACTIVE AND SOME PASSIVE ACTIVITIES

## 2024-08-16 NOTE — H&P
daily, Disp: 90 capsule, Rfl: 1    atorvastatin (LIPITOR) 20 MG tablet, Take 1 tablet by mouth nightly, Disp: 90 tablet, Rfl: 1    losartan (COZAAR) 100 MG tablet, Take 1 tablet by mouth daily, Disp: 90 tablet, Rfl: 1    furosemide (LASIX) 20 MG tablet, Take 2 tablets by mouth daily Taking 40 mg daily, Disp: , Rfl:     MULTIPLE VITAMIN PO, Take  by mouth daily., Disp: , Rfl:     calcium carbonate (OSCAL) 500 MG TABS tablet, Take 1 tablet by mouth daily, Disp: , Rfl:     albuterol sulfate HFA (PROVENTIL;VENTOLIN;PROAIR) 108 (90 Base) MCG/ACT inhaler, inhale 2 puffs by mouth and INTO THE LUNGS every 6 hours if needed for wheezing (Patient not taking: Reported on 8/8/2024), Disp: 8.5 g, Rfl: 5    Handicap Placard MISC, by Does not apply route Exp: 3/9/2028, Disp: 1 each, Rfl: 0    aspirin 81 MG EC tablet, Take 1 tablet by mouth daily, Disp: , Rfl:     Social History     Tobacco Use    Smoking status: Never    Smokeless tobacco: Never   Substance Use Topics    Alcohol use: No       Review of Systems:   Focused review of systems was performed, and negative as pertinent to diagnosis, except as stated in HPI.      Physical Exam  Constitutional:       Appearance: Normal appearance.   Pulmonary:      Effort: Pulmonary effort is normal.   Neurological:      Mental Status: alert.   Psychiatric:         Attention and Perception: Attention and perception normal.         Mood and Affect: Mood and affect normal.   Cardiovascular:      Rate: Normal rate.         ASA: 3          Mallampati: 3       Patient's current physical status, medications, medical history, and HPI have been reviewed and updated as appropriate on this date: 08/16/24    Risk/Benefit(s): The risks, benefits, alternatives, and potential complications have been discussed with the patient/family and informed consent has been obtained for the procedure/sedation.    Diagnosis:   1. Lumbosacral spondylosis without myelopathy            Plan:   BILATERAL LMUBAR CAMACHO

## 2024-08-16 NOTE — DISCHARGE INSTRUCTIONS
Discharge Instructions following Sedation or Anesthesia:  You have  received  a sedative/anesthetic therefore, you should not consume any alcoholic beverages for minimum of 12 hours.  Do not drive or operate machinery for 24 hours.  Do not sign legal documents for 24 hours.  Dizziness, drowsiness, and unsteadiness may occur.  Rest when need to.  Increase diet as tolerated.  Keep up on fluids if diet allows.      General Instructions:  Do not take a tub bath for 72 hours after procedure (this includes hot tubs and swimming pools).  You may shower, but avoid hot water to injection site.   Avoid strenuous activity TODAY especially if you experience dizziness.   Remove band-aid the next day.  Wash off any residual iodine   Do not use heat, heating pad, or any other heating device over the injection site for 3 days after the procedure.  If you experience pain after your procedure, you may continue with your current pain medication as prescribed.  (DO NOT INCREASE YOUR PAIN MEDICATION WITHOUT TALKING TO DOCTOR)  Soreness and pain at injection site is common, may use ice to reduce soreness.    Please complete pain diary as instructed.     Call [facility] at [phone] if you experience:   Fever, chills or temperature over 100    Vomiting, Headache, persistent stiff neck, nausea, blurred vision   Difficulty in urinating or unable to urinate with 8 hours   Increase in weakness, numbness or loss of function   Increased redness, swelling or drainage at the injection site

## 2024-08-16 NOTE — OP NOTE
Patient Name: Kathryn Gipson   YOB: 1934  Room/Bed: Room/bed info not found  Medical Record Number: [id number]  Date: 8/16/2024       Sedation/ Anesthesia Plan:   intravenous sedation   as needed.    Medications Planned:   midazolam (Versed) / Fentanyl  Intravenously  as needed.    Preoperative Diagnosis: Lumbar spondylosis w/o myelopathy or radiculopathy  Postoperative Diagnosis: Lumbar spondylosis w/o myelopathy or radiculopathy  Blood Loss: none    Procedure Performed:  Bilateral Lumbar Medial Branch nerve Blocks at the transverse processes of L4, L5 and sacral  ala under fluoroscopy guidance    Procedure:      The Patient was seen in the preop area, chart was reviewed, informed consent was obtained. Patient was taken to procedure room and was placed in prone position. Vital signs were monitored through out the  Procedure. A time out was completed.  The skin over the back was prepped and draped in sterile manner.     The target point was marked at the junction of Transverse process and superior articular process at the target levels.  Skin and deep tissues were anesthetized with 1 % lidocaine. A 25-gauge needlele was advanced to the target spots under fluoroscopy guidance in AP / Lateral and Oblique views.     Then after negative aspiration contrast dye was injected with live fluoroscopy in AP views that showed  spread of the contrast with no epidural space and no vascular runoff or intrathecal spread.    Finally 0.5 ml of treatment solution 0.5% bupivacaine  was injected at each level.  The needle was removed and a Band-Aid was placed over the needle  insertion site.  The patient's vital signs remained stable and the patient tolerated the procedure well.      Electronically signed by Moy Kessler MD on 8/16/2024 at 12:38 PM    SEDATION NOTE:    ASA CLASSIFICATION  3  MP   CLASSIFICATION  3    Moderate intravenous conscious sedation was supervised by Dr. Kessler  The patient was independently

## 2024-09-04 DIAGNOSIS — E78.5 HYPERLIPIDEMIA, UNSPECIFIED HYPERLIPIDEMIA TYPE: ICD-10-CM

## 2024-09-04 DIAGNOSIS — I10 ESSENTIAL HYPERTENSION: ICD-10-CM

## 2024-09-04 DIAGNOSIS — K21.9 GASTROESOPHAGEAL REFLUX DISEASE WITHOUT ESOPHAGITIS: ICD-10-CM

## 2024-09-04 NOTE — TELEPHONE ENCOUNTER
Last visit: 7/26/24  Last Med refill: 6/7/24, 4/12/24, 6/10/24  Does patient have enough medication for 72 hours: sending to different pharmacy    Next Visit Date:  Future Appointments   Date Time Provider Department Center   9/25/2024 10:00 AM Moy Kessler MD [facility] [place]   10/10/2024 10:00 AM Moy Kessler MD [facility] [place]   11/5/2024 10:00 AM Eliot Turner MD [facility]   4/4/2025  9:45 AM Rosio Eason, APRN - CNP [facility] DEP       Health Maintenance   Topic Date Due    DTaP/Tdap/Td vaccine (1 - Tdap) Never done    Respiratory Syncytial Virus (RSV) Pregnant or age 60 yrs+ (1 - 1-dose 60+ series) Never done    Flu vaccine (1) 08/01/2024    COVID-19 Vaccine (5 - 2023-24 season) 09/01/2024    Lipids  04/01/2025    Depression Screen  04/01/2025    Annual Wellness Visit (Medicare Advantage)  Completed    Shingles vaccine  Completed    Pneumococcal 65+ years Vaccine  Completed    Hepatitis A vaccine  Aged Out    Hepatitis B vaccine  Aged Out    Hib vaccine  Aged Out    Polio vaccine  Aged Out    Meningococcal (ACWY) vaccine  Aged Out       No results found for: \"LABA1C\"          ( goal A1C is < 7)   No components found for: \"LABMICR\"  No components found for: \"LDLCHOLESTEROL\", \"LDLCALC\"    (goal LDL is <100)   AST (U/L)   Date Value   06/02/2024 26     ALT (U/L)   Date Value   06/02/2024 15     BUN (mg/dL)   Date Value   06/04/2024 32 (H)     BP Readings from Last 3 Encounters:   08/16/24 (!) 157/86   08/15/24 (!) 170/79   08/08/24 (!) 152/84          (goal 120/80)    All Future Testing planned in [facility]  Lab Frequency Next Occurrence   NC NJX DX/THER AGT PVRT FACET JT LMBR/SAC 1 LEVEL Once 06/28/2024   NC NJX DX/THER AGT PVRT FACET JT LMBR/SAC 1 LEVEL Once 07/25/2024               Patient Active Problem List:     OA (osteoarthritis)     HTN (hypertension)     GERD (gastroesophageal reflux disease)     Postoperative atrial fibrillation (HCC)     Pulmonary

## 2024-09-05 RX ORDER — ATORVASTATIN CALCIUM 20 MG/1
20 TABLET, FILM COATED ORAL DAILY
Qty: 90 TABLET | Refills: 1 | Status: SHIPPED | OUTPATIENT
Start: 2024-09-05

## 2024-09-05 RX ORDER — METOPROLOL TARTRATE 25 MG/1
25 TABLET, FILM COATED ORAL 2 TIMES DAILY
Qty: 180 TABLET | Refills: 1 | Status: SHIPPED | OUTPATIENT
Start: 2024-09-05

## 2024-09-05 RX ORDER — LOSARTAN POTASSIUM 100 MG/1
100 TABLET ORAL DAILY
Qty: 90 TABLET | Refills: 1 | Status: SHIPPED | OUTPATIENT
Start: 2024-09-05

## 2024-09-25 ENCOUNTER — HOSPITAL ENCOUNTER (OUTPATIENT)
Dept: PAIN MANAGEMENT | Facility: CLINIC | Age: 89
Discharge: HOME | End: 2024-09-25
Payer: MEDICARE

## 2024-09-25 VITALS
HEART RATE: 57 BPM | SYSTOLIC BLOOD PRESSURE: 172 MMHG | RESPIRATION RATE: 12 BRPM | DIASTOLIC BLOOD PRESSURE: 81 MMHG | BODY MASS INDEX: 32.94 KG/M2 | TEMPERATURE: 97.1 F | WEIGHT: 179 LBS | HEIGHT: 62 IN | OXYGEN SATURATION: 95 %

## 2024-09-25 DIAGNOSIS — R52 PAIN MANAGEMENT: ICD-10-CM

## 2024-09-25 DIAGNOSIS — M47.817 LUMBOSACRAL SPONDYLOSIS WITHOUT MYELOPATHY: Primary | ICD-10-CM

## 2024-09-25 PROCEDURE — 64635 DESTROY LUMB/SAC FACET JNT: CPT

## 2024-09-25 PROCEDURE — 6360000002 HC RX W HCPCS: Performed by: ANESTHESIOLOGY

## 2024-09-25 PROCEDURE — 64636 DESTROY L/S FACET JNT ADDL: CPT | Performed by: ANESTHESIOLOGY

## 2024-09-25 PROCEDURE — 2500000003 HC RX 250 WO HCPCS: Performed by: ANESTHESIOLOGY

## 2024-09-25 PROCEDURE — 64636 DESTROY L/S FACET JNT ADDL: CPT

## 2024-09-25 PROCEDURE — 99152 MOD SED SAME PHYS/QHP 5/>YRS: CPT | Performed by: ANESTHESIOLOGY

## 2024-09-25 PROCEDURE — 64635 DESTROY LUMB/SAC FACET JNT: CPT | Performed by: ANESTHESIOLOGY

## 2024-09-25 RX ORDER — LIDOCAINE HYDROCHLORIDE 10 MG/ML
INJECTION, SOLUTION EPIDURAL; INFILTRATION; INTRACAUDAL; PERINEURAL
Status: COMPLETED | OUTPATIENT
Start: 2024-09-25 | End: 2024-09-25

## 2024-09-25 RX ORDER — MIDAZOLAM HYDROCHLORIDE 2 MG/2ML
INJECTION, SOLUTION INTRAMUSCULAR; INTRAVENOUS
Status: COMPLETED | OUTPATIENT
Start: 2024-09-25 | End: 2024-09-25

## 2024-09-25 RX ORDER — LIDOCAINE HYDROCHLORIDE 40 MG/ML
INJECTION, SOLUTION RETROBULBAR
Status: COMPLETED | OUTPATIENT
Start: 2024-09-25 | End: 2024-09-25

## 2024-09-25 RX ORDER — FENTANYL CITRATE 50 UG/ML
INJECTION, SOLUTION INTRAMUSCULAR; INTRAVENOUS
Status: COMPLETED | OUTPATIENT
Start: 2024-09-25 | End: 2024-09-25

## 2024-09-25 RX ADMIN — LIDOCAINE HYDROCHLORIDE 5 ML: 10 INJECTION, SOLUTION EPIDURAL; INFILTRATION; INTRACAUDAL at 10:12

## 2024-09-25 RX ADMIN — LIDOCAINE HYDROCHLORIDE 5 ML: 40 SOLUTION RETROBULBAR; TOPICAL at 10:15

## 2024-09-25 RX ADMIN — FENTANYL CITRATE 50 MCG: 50 INJECTION, SOLUTION INTRAMUSCULAR; INTRAVENOUS at 10:12

## 2024-09-25 RX ADMIN — LIDOCAINE HYDROCHLORIDE 5 ML: 10 INJECTION, SOLUTION EPIDURAL; INFILTRATION; INTRACAUDAL at 10:19

## 2024-09-25 RX ADMIN — MIDAZOLAM HYDROCHLORIDE 1 MG: 1 INJECTION, SOLUTION INTRAMUSCULAR; INTRAVENOUS at 10:12

## 2024-09-25 ASSESSMENT — PAIN DESCRIPTION - DESCRIPTORS: DESCRIPTORS: TIGHTNESS

## 2024-10-17 ENCOUNTER — HOSPITAL ENCOUNTER (OUTPATIENT)
Dept: PAIN MANAGEMENT | Age: 89
Discharge: HOME | End: 2024-10-17
Payer: MEDICARE

## 2024-10-17 VITALS — BODY MASS INDEX: 32.94 KG/M2 | HEIGHT: 62 IN | WEIGHT: 179 LBS

## 2024-10-17 PROCEDURE — 99213 OFFICE O/P EST LOW 20 MIN: CPT

## 2024-10-17 PROCEDURE — 99213 OFFICE O/P EST LOW 20 MIN: CPT | Performed by: ANESTHESIOLOGY

## 2024-10-17 ASSESSMENT — ENCOUNTER SYMPTOMS
BACK PAIN: 1
EYES NEGATIVE: 1
NAUSEA: 0

## 2024-10-17 NOTE — PROGRESS NOTES
The patient is a 89 y.o.Non- / non  female.    Chief Complaint   Patient presents with    Back Pain     RFA f/u        HPI  The patient came today for a follow-up after radiofrequency ablation of median branches at L4, L5 and sacral ala for L4/5 and L5/S1 facet joints bilaterally. She had around 50% improvement in pain and has improved her daily activities.     Today, she was only having some paraspinal tenderness on palpation on the right side of lower back. No tenderness with bending forward, backwards and on the sides.     No change in her medical history. No radiating pain to her lower extremities, no numbness or paresthesia or loss of bladder or bowel control.     Dx:   S/P: RFA 9/25/24    Outcome   Any improvement of activity?  It's about the same   Any side effects (appetite,leg cramping,facial fleshing):facial flushing   Increase of pain:  No  Pain score Today:  5  % of pain relief:100% low back 50% right side   Pain diary (medial branch block): No    No results found for: \"LABA1C\"      Past Medical History:   Diagnosis Date    Acid reflux     Atrial fibrillation (HCC)     CAD (coronary artery disease)     HTN (hypertension)     Hyperlipidemia         Past Surgical History:   Procedure Laterality Date    APPENDECTOMY      BLADDER SUSPENSION      HYSTERECTOMY (CERVIX STATUS UNKNOWN)      JOINT REPLACEMENT Bilateral     MITRAL VALVE SURGERY  03/14/2016    TT/Dr. Clarke    NASAL SEPTUM SURGERY      VASCULAR SURGERY Bilateral 6/3/2024    BILATERAL PULMONARY THROMBECTOMY MECHANICAL PERCUTANEOUS / INARI performed by Yeny Tarango MD at [facility]       Social History     Socioeconomic History    Marital status:      Spouse name: None    Number of children: None    Years of education: None    Highest education level: None   Tobacco Use    Smoking status: Never    Smokeless tobacco: Never   Substance and Sexual Activity    Alcohol use: No    Drug use: No    Sexual activity: Never

## 2025-03-25 DIAGNOSIS — E78.5 HYPERLIPIDEMIA, UNSPECIFIED HYPERLIPIDEMIA TYPE: ICD-10-CM

## 2025-03-25 DIAGNOSIS — I10 ESSENTIAL HYPERTENSION: ICD-10-CM

## 2025-03-26 NOTE — TELEPHONE ENCOUNTER
Last visit: 07/26/24  Last Med refill: 11/26/24  Does patient have enough medication for 72 hours: No:     Next Visit Date:  Future Appointments   Date Time Provider Department Center   4/4/2025  9:45 AM Rosio Eason, APRN - CNP [facility] E[facility] DEP   4/17/2025 10:00 AM Moy Kessler MD [facility] [place]       Health Maintenance   Topic Date Due    DTaP/Tdap/Td vaccine (1 - Tdap) Never done    Respiratory Syncytial Virus (RSV) Pregnant or age 60 yrs+ (1 - 1-dose 75+ series) Never done    Flu vaccine (1) 08/01/2024    COVID-19 Vaccine (5 - 2024-25 season) 09/01/2024    Annual Wellness Visit (Medicare Advantage)  01/01/2025    Lipids  04/01/2025    Depression Screen  04/01/2025    Shingles vaccine  Completed    Pneumococcal 50+ years Vaccine  Completed    Hepatitis A vaccine  Aged Out    Hepatitis B vaccine  Aged Out    Hib vaccine  Aged Out    Polio vaccine  Aged Out    Meningococcal (ACWY) vaccine  Aged Out    Meningococcal B vaccine  Aged Out       No results found for: \"LABA1C\"          ( goal A1C is < 7)   No components found for: \"LABMICR\"  No components found for: \"LDLCHOLESTEROL\", \"LDLCALC\"    (goal LDL is <100)   AST (U/L)   Date Value   06/02/2024 26     ALT (U/L)   Date Value   06/02/2024 15     BUN (mg/dL)   Date Value   06/04/2024 32 (H)     BP Readings from Last 3 Encounters:   09/25/24 (!) 172/81   08/16/24 (!) 157/86   08/15/24 (!) 170/79          (goal 120/80)    All Future Testing planned in [facility]  Lab Frequency Next Occurrence               Patient Active Problem List:     OA (osteoarthritis)     HTN (hypertension)     GERD (gastroesophageal reflux disease)     Postoperative atrial fibrillation (HCC)     Pulmonary embolism, bilateral (HCC)     Bilateral pulmonary embolism (HCC)     Chronic anticoagulation     DDD (degenerative disc disease), lumbar     Lumbosacral spondylosis without myelopathy     Chronic obstructive pulmonary disease, unspecified

## 2025-03-27 RX ORDER — ATORVASTATIN CALCIUM 20 MG/1
20 TABLET, FILM COATED ORAL DAILY
Qty: 90 TABLET | Refills: 3 | Status: SHIPPED | OUTPATIENT
Start: 2025-03-27

## 2025-03-27 RX ORDER — LOSARTAN POTASSIUM 100 MG/1
100 TABLET ORAL DAILY
Qty: 90 TABLET | Refills: 3 | Status: SHIPPED | OUTPATIENT
Start: 2025-03-27

## 2025-03-27 RX ORDER — METOPROLOL TARTRATE 25 MG/1
25 TABLET, FILM COATED ORAL 2 TIMES DAILY
Qty: 180 TABLET | Refills: 3 | Status: SHIPPED | OUTPATIENT
Start: 2025-03-27

## 2025-04-15 DIAGNOSIS — K21.9 GASTROESOPHAGEAL REFLUX DISEASE WITHOUT ESOPHAGITIS: ICD-10-CM

## 2025-04-15 NOTE — TELEPHONE ENCOUNTER
Last visit: 07/26/24  Last Med refill: 11/26/24  Does patient have enough medication for 72 hours: No:     Next Visit Date:   04/28/25  Future Appointments   Date Time Provider Department Center   4/17/2025 10:00 AM Moy Kessler MD [facility] [place]   4/28/2025 12:30 PM Rosio Eason, APRN - CNP [facility] ECC DEP       Health Maintenance   Topic Date Due    DTaP/Tdap/Td vaccine (1 - Tdap) Never done    Respiratory Syncytial Virus (RSV) Pregnant or age 60 yrs+ (1 - 1-dose 75+ series) Never done    COVID-19 Vaccine (5 - 2024-25 season) 09/01/2024    Annual Wellness Visit (Medicare Advantage)  01/01/2025    Lipids  04/01/2025    Depression Screen  04/01/2025    Flu vaccine (Season Ended) 08/01/2025    Shingles vaccine  Completed    Pneumococcal 50+ years Vaccine  Completed    Hepatitis A vaccine  Aged Out    Hepatitis B vaccine  Aged Out    Hib vaccine  Aged Out    Polio vaccine  Aged Out    Meningococcal (ACWY) vaccine  Aged Out    Meningococcal B vaccine  Aged Out       No results found for: \"LABA1C\"          ( goal A1C is < 7)   No components found for: \"LABMICR\"  No components found for: \"LDLCHOLESTEROL\", \"LDLCALC\"    (goal LDL is <100)   AST (U/L)   Date Value   06/02/2024 26     ALT (U/L)   Date Value   06/02/2024 15     BUN (mg/dL)   Date Value   06/04/2024 32 (H)     BP Readings from Last 3 Encounters:   09/25/24 (!) 172/81   08/16/24 (!) 157/86   08/15/24 (!) 170/79          (goal 120/80)    All Future Testing planned in [facility]  Lab Frequency Next Occurrence               Patient Active Problem List:     OA (osteoarthritis)     HTN (hypertension)     GERD (gastroesophageal reflux disease)     Postoperative atrial fibrillation (HCC)     Pulmonary embolism, bilateral (HCC)     Bilateral pulmonary embolism (HCC)     Chronic anticoagulation     DDD (degenerative disc disease), lumbar     Lumbosacral spondylosis without myelopathy     Chronic obstructive pulmonary disease, unspecified

## 2025-04-16 RX ORDER — OMEPRAZOLE 20 MG/1
20 CAPSULE, DELAYED RELEASE ORAL DAILY
Qty: 90 CAPSULE | Refills: 0 | Status: SHIPPED | OUTPATIENT
Start: 2025-04-16

## 2025-04-17 ENCOUNTER — HOSPITAL ENCOUNTER (OUTPATIENT)
Dept: PAIN MANAGEMENT | Age: 89
Discharge: HOME | End: 2025-04-17
Payer: MEDICARE

## 2025-04-17 VITALS — WEIGHT: 179 LBS | BODY MASS INDEX: 32.94 KG/M2 | HEIGHT: 62 IN

## 2025-04-17 DIAGNOSIS — M47.817 LUMBOSACRAL SPONDYLOSIS WITHOUT MYELOPATHY: Primary | ICD-10-CM

## 2025-04-17 PROCEDURE — 99213 OFFICE O/P EST LOW 20 MIN: CPT

## 2025-04-17 PROCEDURE — 99213 OFFICE O/P EST LOW 20 MIN: CPT | Performed by: ANESTHESIOLOGY

## 2025-04-17 ASSESSMENT — ENCOUNTER SYMPTOMS
RESPIRATORY NEGATIVE: 1
EYES NEGATIVE: 1
BACK PAIN: 1

## 2025-04-17 ASSESSMENT — PAIN SCALES - GENERAL: PAINLEVEL_OUTOF10: 7

## 2025-04-17 NOTE — PROGRESS NOTES
The patient is a 89 y.o.Non- / non  female.    Chief Complaint   Patient presents with    Back Pain        HPI  This is a pleasant 89-year-old female with history of chronic axial lower back pain  She was diagnosed with lumbar spondylosis and facet mediated pain, failed conservative measures  She had excellent short-term relief with diagnostic and confirmatory block and underwent bilateral lumbar medial branch nerve radiofrequency ablation at L4-5 and L5-S1 facet with fluoroscopy guidance on September 25, 2024  She reported more than 75% improvement in axial back pain with baseline pain score went from 7/10 to 0-1/10 on  Relief lasted for almost 6 months  Pain is now returning back to the baseline  She describes it as axial chronic constant aching stiffness that aggravates with routine activity  Clinical presentation suggest recurrence of facet mediated pain  No dermatomal radiation of pain  No focal weakness or bladder bowel issues  She has been doing home exercises  Discussed repeating bilateral lumbar radiofrequency ablation at L4-5 L5-S1 facet with fluoroscopy guidance  Risk and benefit discussed with patient  She wished to proceed as advised  Oswestry disability index to moderate disability associated with pain    Patient is here today for: back pain  Pain level: 7 walking for along time  Character: aching   Radiating: no  Weakness or numbness: no  Aggravating Factors: ALDs  Alleviating Factors:  sitting   Constant or intermitting: intermitting   Bladder/bowel loss: no      Past Medical History:   Diagnosis Date    Acid reflux     Atrial fibrillation (HCC)     CAD (coronary artery disease)     HTN (hypertension)     Hyperlipidemia         Past Surgical History:   Procedure Laterality Date    APPENDECTOMY      BLADDER SUSPENSION      HYSTERECTOMY (CERVIX STATUS UNKNOWN)      JOINT REPLACEMENT Bilateral     MITRAL VALVE SURGERY  03/14/2016    TT/Dr. Clarke    NASAL SEPTUM SURGERY      VASCULAR

## 2025-04-28 ENCOUNTER — OFFICE VISIT (OUTPATIENT)
Dept: FAMILY MEDICINE CLINIC | Age: 89
End: 2025-04-28
Payer: MEDICARE

## 2025-04-28 VITALS
WEIGHT: 183 LBS | OXYGEN SATURATION: 98 % | HEART RATE: 70 BPM | BODY MASS INDEX: 33.68 KG/M2 | DIASTOLIC BLOOD PRESSURE: 60 MMHG | HEIGHT: 62 IN | SYSTOLIC BLOOD PRESSURE: 120 MMHG

## 2025-04-28 DIAGNOSIS — Z00.00 MEDICARE ANNUAL WELLNESS VISIT, SUBSEQUENT: Primary | ICD-10-CM

## 2025-04-28 PROCEDURE — G0439 PPPS, SUBSEQ VISIT: HCPCS | Performed by: NURSE PRACTITIONER

## 2025-04-28 PROCEDURE — 1159F MED LIST DOCD IN RCRD: CPT | Performed by: NURSE PRACTITIONER

## 2025-04-28 PROCEDURE — 1123F ACP DISCUSS/DSCN MKR DOCD: CPT | Performed by: NURSE PRACTITIONER

## 2025-04-28 RX ORDER — AMLODIPINE BESYLATE 2.5 MG/1
2.5 TABLET ORAL DAILY
COMMUNITY
Start: 2025-03-17

## 2025-04-28 SDOH — ECONOMIC STABILITY: FOOD INSECURITY: WITHIN THE PAST 12 MONTHS, THE FOOD YOU BOUGHT JUST DIDN'T LAST AND YOU DIDN'T HAVE MONEY TO GET MORE.: NEVER TRUE

## 2025-04-28 SDOH — ECONOMIC STABILITY: FOOD INSECURITY: WITHIN THE PAST 12 MONTHS, YOU WORRIED THAT YOUR FOOD WOULD RUN OUT BEFORE YOU GOT MONEY TO BUY MORE.: NEVER TRUE

## 2025-04-28 ASSESSMENT — LIFESTYLE VARIABLES
HOW OFTEN DO YOU HAVE A DRINK CONTAINING ALCOHOL: NEVER
HOW MANY STANDARD DRINKS CONTAINING ALCOHOL DO YOU HAVE ON A TYPICAL DAY: PATIENT DOES NOT DRINK

## 2025-04-28 ASSESSMENT — PATIENT HEALTH QUESTIONNAIRE - PHQ9
SUM OF ALL RESPONSES TO PHQ QUESTIONS 1-9: 0
2. FEELING DOWN, DEPRESSED OR HOPELESS: NOT AT ALL
SUM OF ALL RESPONSES TO PHQ QUESTIONS 1-9: 0
SUM OF ALL RESPONSES TO PHQ QUESTIONS 1-9: 0
1. LITTLE INTEREST OR PLEASURE IN DOING THINGS: NOT AT ALL
SUM OF ALL RESPONSES TO PHQ QUESTIONS 1-9: 0

## 2025-04-28 NOTE — PROGRESS NOTES
Medicare Annual Wellness Visit    Kathryn Gipson is here for Medicare AWV    Assessment & Plan   Medicare annual wellness visit, subsequent       No follow-ups on file.     Subjective       Patient's complete Health Risk Assessment and screening values have been reviewed and are found in Flowsheets. The following problems were reviewed today and where indicated follow up appointments were made and/or referrals ordered.    Positive Risk Factor Screenings with Interventions:    Fall Risk:  Do you feel unsteady or are you worried about falling? : (!) yes  2 or more falls in past year?: no  Fall with injury in past year?: no     Interventions:    Reviewed medications, home hazards, visual acuity, and co-morbidities that can increase risk for falls              Poor Eating Habits/Diet:  Do you eat balanced/healthy meals regularly?: (!) No  Interventions:  Eats one good meal a day, snacks the rest of the time.     Abnormal BMI (obese):  Body mass index is 33.47 kg/m². (!) Abnormal  Interventions:  Patient declines any further evaluation or treatment        Dentist Screen:  Have you seen the dentist within the past year?: (!) No    Intervention:  Advised to schedule with their dentist     Vision Screen:  Do you have difficulty driving, watching TV, or doing any of your daily activities because of your eyesight?: No  Have you had an eye exam within the past year?: (!) No  Interventions:   Patient encouraged to make appointment with their eye specialist                  Objective   Vitals:    04/28/25 1220   BP: 120/60   BP Site: Right Upper Arm   Patient Position: Sitting   BP Cuff Size: Large Adult   Pulse: 70   SpO2: 98%   Weight: 83 kg (183 lb)   Height: 1.575 m (5' 2\")      Body mass index is 33.47 kg/m².                  Allergies   Allergen Reactions    Diclofenac Shortness Of Breath     Prior to Visit Medications    Medication Sig Taking? Authorizing Provider   amLODIPine (NORVASC) 2.5 MG tablet Take 1 tablet by

## 2025-05-07 ENCOUNTER — HOSPITAL ENCOUNTER (OUTPATIENT)
Dept: PAIN MANAGEMENT | Facility: CLINIC | Age: 89
Discharge: HOME | End: 2025-05-07
Payer: MEDICARE

## 2025-05-07 VITALS
OXYGEN SATURATION: 94 % | TEMPERATURE: 97 F | RESPIRATION RATE: 15 BRPM | DIASTOLIC BLOOD PRESSURE: 96 MMHG | HEIGHT: 62 IN | WEIGHT: 183 LBS | HEART RATE: 58 BPM | BODY MASS INDEX: 33.68 KG/M2 | SYSTOLIC BLOOD PRESSURE: 158 MMHG

## 2025-05-07 DIAGNOSIS — M47.817 LUMBOSACRAL SPONDYLOSIS WITHOUT MYELOPATHY: Primary | ICD-10-CM

## 2025-05-07 DIAGNOSIS — R52 PAIN MANAGEMENT: ICD-10-CM

## 2025-05-07 PROCEDURE — 64636 DESTROY L/S FACET JNT ADDL: CPT | Performed by: ANESTHESIOLOGY

## 2025-05-07 PROCEDURE — 6360000002 HC RX W HCPCS: Performed by: ANESTHESIOLOGY

## 2025-05-07 PROCEDURE — 99152 MOD SED SAME PHYS/QHP 5/>YRS: CPT | Performed by: ANESTHESIOLOGY

## 2025-05-07 PROCEDURE — 64636 DESTROY L/S FACET JNT ADDL: CPT

## 2025-05-07 PROCEDURE — 64635 DESTROY LUMB/SAC FACET JNT: CPT | Performed by: ANESTHESIOLOGY

## 2025-05-07 PROCEDURE — 64635 DESTROY LUMB/SAC FACET JNT: CPT

## 2025-05-07 RX ORDER — FENTANYL CITRATE 50 UG/ML
INJECTION, SOLUTION INTRAMUSCULAR; INTRAVENOUS
Status: COMPLETED | OUTPATIENT
Start: 2025-05-07 | End: 2025-05-07

## 2025-05-07 RX ORDER — MIDAZOLAM HYDROCHLORIDE 2 MG/2ML
INJECTION, SOLUTION INTRAMUSCULAR; INTRAVENOUS
Status: COMPLETED | OUTPATIENT
Start: 2025-05-07 | End: 2025-05-07

## 2025-05-07 RX ORDER — LIDOCAINE HYDROCHLORIDE 10 MG/ML
INJECTION, SOLUTION EPIDURAL; INFILTRATION; INTRACAUDAL; PERINEURAL
Status: COMPLETED | OUTPATIENT
Start: 2025-05-07 | End: 2025-05-07

## 2025-05-07 RX ORDER — LIDOCAINE HYDROCHLORIDE 40 MG/ML
INJECTION, SOLUTION RETROBULBAR
Status: COMPLETED | OUTPATIENT
Start: 2025-05-07 | End: 2025-05-07

## 2025-05-07 RX ADMIN — MIDAZOLAM HYDROCHLORIDE 1 MG: 1 INJECTION, SOLUTION INTRAMUSCULAR; INTRAVENOUS at 11:03

## 2025-05-07 RX ADMIN — LIDOCAINE HYDROCHLORIDE 5 ML: 40 INJECTION, SOLUTION RETROBULBAR at 11:08

## 2025-05-07 RX ADMIN — LIDOCAINE HYDROCHLORIDE 10 ML: 10 INJECTION, SOLUTION EPIDURAL; INFILTRATION; INTRACAUDAL; PERINEURAL at 11:06

## 2025-05-07 RX ADMIN — FENTANYL CITRATE 25 MCG: 50 INJECTION, SOLUTION INTRAMUSCULAR; INTRAVENOUS at 11:03

## 2025-05-07 ASSESSMENT — PAIN DESCRIPTION - ORIENTATION: ORIENTATION: RIGHT;LOWER;MID

## 2025-05-07 ASSESSMENT — PAIN DESCRIPTION - DESCRIPTORS: DESCRIPTORS: ACHING

## 2025-05-07 ASSESSMENT — PAIN - FUNCTIONAL ASSESSMENT
PAIN_FUNCTIONAL_ASSESSMENT: PREVENTS OR INTERFERES WITH MANY ACTIVE NOT PASSIVE ACTIVITIES
PAIN_FUNCTIONAL_ASSESSMENT: NONE - DENIES PAIN

## 2025-05-07 ASSESSMENT — PAIN SCALES - GENERAL: PAINLEVEL_OUTOF10: 8

## 2025-05-07 ASSESSMENT — PAIN DESCRIPTION - ONSET: ONSET: PROGRESSIVE

## 2025-05-07 ASSESSMENT — PAIN DESCRIPTION - FREQUENCY: FREQUENCY: CONTINUOUS

## 2025-05-07 ASSESSMENT — PAIN DESCRIPTION - LOCATION: LOCATION: BACK

## 2025-05-07 ASSESSMENT — PAIN DESCRIPTION - PAIN TYPE: TYPE: CHRONIC PAIN

## 2025-05-07 ASSESSMENT — PAIN DESCRIPTION - DIRECTION: RADIATING_TOWARDS: RIGHT BUTTOCK

## 2025-05-07 NOTE — DISCHARGE INSTRUCTIONS
Discharge Instructions following Sedation or Anesthesia:  You have  received  a sedative/anesthetic therefore, you should not consume any alcoholic beverages for minimum of 12 hours.  Do not drive or operate machinery for 24 hours.  Do not sign legal documents for 24 hours.  Dizziness, drowsiness, and unsteadiness may occur.  Rest when need to.  Increase diet as tolerated.  Keep up on fluids if diet allows.      General Instructions:  Do not take a tub bath for 72 hours after procedure (this includes hot tubs and swimming pools).  You may shower, but avoid hot water to injection site.   Avoid strenuous activity TODAY especially if you experience dizziness.   Remove band-aid the next day.  Wash off any residual iodine   Do not use heat, heating pad, or any other heating device over the injection site for 3 days after the procedure.  If you experience pain after your procedure, you may continue with your current pain medication as prescribed.  (DO NOT INCREASE YOUR PAIN MEDICATION WITHOUT TALKING TO DOCTOR)  Soreness and pain at injection site is common, may use ice to reduce soreness.    Call [facility] at [phone] if you experience:   Fever, chills or temperature over 100    Vomiting, Headache, persistent stiff neck, nausea, blurred vision   Difficulty in urinating or unable to urinate with 8 hours   Increase in weakness, numbness or loss of function   Increased redness, swelling or drainage at the injection site

## 2025-05-07 NOTE — INTERVAL H&P NOTE
Update History & Physical    The patient's History and Physical of April 17, 2025 was reviewed with the patient and I examined the patient. There was no change. The surgical site was confirmed by the patient and me.     Plan: The risks, benefits, expected outcome, and alternative to the recommended procedure have been discussed with the patient. Patient understands and wants to proceed with the procedure.     ASA 3  MP 3    Electronically signed by Moy Kessler MD on 5/7/2025 at 10:46 AM

## 2025-06-05 ENCOUNTER — HOSPITAL ENCOUNTER (OUTPATIENT)
Dept: PAIN MANAGEMENT | Age: 89
Discharge: HOME | End: 2025-06-05
Payer: MEDICARE

## 2025-06-05 VITALS — WEIGHT: 183 LBS | BODY MASS INDEX: 33.68 KG/M2 | HEIGHT: 62 IN

## 2025-06-05 DIAGNOSIS — M47.817 LUMBOSACRAL SPONDYLOSIS WITHOUT MYELOPATHY: ICD-10-CM

## 2025-06-05 DIAGNOSIS — Z79.01 CHRONIC ANTICOAGULATION: Primary | ICD-10-CM

## 2025-06-05 PROCEDURE — 99213 OFFICE O/P EST LOW 20 MIN: CPT

## 2025-06-05 PROCEDURE — 99213 OFFICE O/P EST LOW 20 MIN: CPT | Performed by: ANESTHESIOLOGY

## 2025-06-05 ASSESSMENT — ENCOUNTER SYMPTOMS
BACK PAIN: 1
RESPIRATORY NEGATIVE: 1
EYES NEGATIVE: 1

## 2025-06-05 NOTE — PROGRESS NOTES
The patient is a 89 y.o.Non- / non  female.    Chief Complaint   Patient presents with    Back Pain     RFA f/u        Back Pain  Associated symptoms include numbness. Pertinent negatives include no fever, headaches or weakness.       Chief Complaint: back pain  Dx:  S/P: RFA 5/7/25      Outcome   Any improvement of activity? It's the same    Any side effects (appetite,leg cramping,facial fleshing):no   Increase of pain:  No  Pain score Today:  1  % of pain relief:98%   Pain diary (medial branch block): Yes    No results found for: \"LABA1C\"       Past Medical History:   Diagnosis Date    Acid reflux     Atrial fibrillation (HCC)     CAD (coronary artery disease)     HTN (hypertension)     Hyperlipidemia         Past Surgical History:   Procedure Laterality Date    APPENDECTOMY      BLADDER SUSPENSION      HYSTERECTOMY (CERVIX STATUS UNKNOWN)      JOINT REPLACEMENT Bilateral     MITRAL VALVE SURGERY  03/14/2016    TT/Dr. Clarke    NASAL SEPTUM SURGERY      VASCULAR SURGERY Bilateral 6/3/2024    BILATERAL PULMONARY THROMBECTOMY MECHANICAL PERCUTANEOUS / INARI performed by Yeny Tarango MD at [facility]       Social History     Socioeconomic History    Marital status:      Spouse name: None    Number of children: None    Years of education: None    Highest education level: None   Tobacco Use    Smoking status: Never    Smokeless tobacco: Never   Substance and Sexual Activity    Alcohol use: No    Drug use: No    Sexual activity: Never     Social Drivers of Health     Financial Resource Strain: Low Risk  (4/1/2024)    Overall Financial Resource Strain (CARDIA)     Difficulty of Paying Living Expenses: Not hard at all   Food Insecurity: No Food Insecurity (4/28/2025)    Hunger Vital Sign     Worried About Running Out of Food in the Last Year: Never true     Ran Out of Food in the Last Year: Never true   Transportation Needs: No Transportation Needs (4/28/2025)    PRAPARE - Transportation

## 2025-07-19 DIAGNOSIS — K21.9 GASTROESOPHAGEAL REFLUX DISEASE WITHOUT ESOPHAGITIS: ICD-10-CM

## 2025-07-21 RX ORDER — OMEPRAZOLE 20 MG/1
20 CAPSULE, DELAYED RELEASE ORAL DAILY
Qty: 90 CAPSULE | Refills: 1 | Status: SHIPPED | OUTPATIENT
Start: 2025-07-21

## 2025-07-21 NOTE — TELEPHONE ENCOUNTER
Last visit: 4/28/25  Last Med refill: 4/16/25  Does patient have enough medication for 72 hours: No:     Next Visit Date:  Future Appointments   Date Time Provider Department Center   10/28/2025  1:00 PM Rosio Eason APRN - CNP [place] [facility] DEP   12/2/2025 10:40 AM Usha Mata APRN - CNP [facility] [place]   5/1/2026 12:15 PM Rosio Eason APRN - CNP [place] [facility] DEP       Health Maintenance   Topic Date Due    DTaP/Tdap/Td vaccine (1 - Tdap) Never done    Respiratory Syncytial Virus (RSV) Pregnant or age 60 yrs+ (1 - 1-dose 75+ series) Never done    Lipids  04/01/2025    COVID-19 Vaccine (6 - 2024-25 season) 05/18/2025    Flu vaccine (1) 08/01/2025    Depression Screen  04/28/2026    Annual Wellness Visit (Medicare Advantage)  Completed    Shingles vaccine  Completed    Pneumococcal 50+ years Vaccine  Completed    Hepatitis A vaccine  Aged Out    Hepatitis B vaccine  Aged Out    Hib vaccine  Aged Out    Polio vaccine  Aged Out    Meningococcal (ACWY) vaccine  Aged Out    Meningococcal B vaccine  Aged Out       No results found for: \"LABA1C\"          ( goal A1C is < 7)   No components found for: \"LABMICR\"  No components found for: \"LDLCHOLESTEROL\", \"LDLCALC\"    (goal LDL is <100)   AST (U/L)   Date Value   06/02/2024 26     ALT (U/L)   Date Value   06/02/2024 15     BUN (mg/dL)   Date Value   06/04/2024 32 (H)     BP Readings from Last 3 Encounters:   05/07/25 (!) 158/96   04/28/25 120/60   09/25/24 (!) 172/81          (goal 120/80)    All Future Testing planned in [facility]  Lab Frequency Next Occurrence   FACET JOINT L/S SINGLE Once 04/17/2025               Patient Active Problem List:     OA (osteoarthritis)     HTN (hypertension)     GERD (gastroesophageal reflux disease)     Postoperative atrial fibrillation (HCC)     Pulmonary embolism, bilateral (HCC)     Bilateral pulmonary embolism (HCC)     Chronic anticoagulation     DDD (degenerative disc disease), lumbar

## (undated) DEVICE — GUIDEWIRE: Brand: AMPLATZ SUPER STIFF™

## (undated) DEVICE — SPONGE LAP W18XL18IN WHT COT 4 PLY FLD STRUNG RADPQ DISP ST 2 PER PACK

## (undated) DEVICE — RADIFOCUS GLIDEWIRE ADVANTAGE GUIDEWIRE: Brand: GLIDEWIRE ADVANTAGE

## (undated) DEVICE — CATHETER ANGGRPHC 5FR DIA 125CML STNLSS STEEL LGTO HDRPHLC C

## (undated) DEVICE — FLOWSAVER: Brand: FLOWSAVER

## (undated) DEVICE — PROVE COVER: Brand: UNBRANDED

## (undated) DEVICE — SYRINGE 20ML LL S/C 50

## (undated) DEVICE — GLOVE SURG SZ 75 L12IN FNGR THK79MIL GRN LTX FREE

## (undated) DEVICE — GEL US 20GM NONIRRITATING OVERWRAPPED FILE PCH TRNSMIT

## (undated) DEVICE — STRAP ARMBRD W1.5XL32IN FOAM STR YET SFT W/ HK AND LOOP

## (undated) DEVICE — PINNACLE INTRODUCER SHEATH: Brand: PINNACLE

## (undated) DEVICE — DECANTER BAG 9": Brand: MEDLINE INDUSTRIES, INC.

## (undated) DEVICE — SOLUTION IRRIG 1000ML STRL H2O USP PLAS POUR BTL

## (undated) DEVICE — 48" PROBE COVER W/GEL, ULTRASOUND, STERILE: Brand: SITE-RITE

## (undated) DEVICE — SHEET,DRAPE,70X100,STERILE: Brand: MEDLINE

## (undated) DEVICE — PROTECTOR ULN NRV PUR FOAM HK LOOP STRP ANATOMICALLY

## (undated) DEVICE — STRAP,POSITIONING,KNEE/BODY,FOAM,4X60": Brand: MEDLINE

## (undated) DEVICE — 3M™ IOBAN™ 2 ANTIMICROBIAL INCISE DRAPE 6650EZ: Brand: IOBAN™ 2

## (undated) DEVICE — MARKER,SKIN,WI/RULER AND LABELS: Brand: MEDLINE

## (undated) DEVICE — TOWEL,OR,DSP,ST,NATURAL,DLX,4/PK,20PK/CS: Brand: MEDLINE

## (undated) DEVICE — GLOVE SURG SZ 7 CRM LTX FREE POLYISOPRENE POLYMER BEAD ANTI

## (undated) DEVICE — STOPCOCK 3-WAY 1050 PSI HIGH OFF ROT L/L

## (undated) DEVICE — SHEET, T, LAPAROTOMY, STERILE: Brand: MEDLINE

## (undated) DEVICE — GAUZE,SPONGE,4"X4",16PLY,XRAY,STRL,LF: Brand: MEDLINE

## (undated) DEVICE — TRIEVER20: Brand: TRIEVER20

## (undated) DEVICE — FLOWTRIEVER THROMBECTOMY SYSTEM PRICE PER PROCEDURE

## (undated) DEVICE — INTRI24™ INTRODUCER SHEATH (SHEATH): Brand: INTRI24 INTRODUCER SHEATH

## (undated) DEVICE — C-ARM: Brand: UNBRANDED

## (undated) DEVICE — PERCLOSE PROGLIDE™ SUTURE-MEDIATED CLOSURE SYSTEM: Brand: PERCLOSE PROGLIDE™

## (undated) DEVICE — GLOVE SURG SZ 8 CRM LTX FREE POLYISOPRENE POLYMER BEAD ANTI

## (undated) DEVICE — SOLUTION IV 1000ML 0.9% SOD CHL PH 5 INJ USP VIAFLX PLAS

## (undated) DEVICE — BLADE,CARBON-STEEL,11,STRL,DISPOSABLE,TB: Brand: MEDLINE

## (undated) DEVICE — SYRINGE ONLY,20ML LUER LOCK: Brand: MEDLINE INDUSTRIES, INC.

## (undated) DEVICE — SURGICAL PROCEDURE TRAY CRD CATH SVMMC

## (undated) DEVICE — SYRINGE MED 50ML LUERLOCK TIP

## (undated) DEVICE — KIT MICRO INTRO 4FR STIFF 40CM NIGHTENALL TUNGSTEN 7SMT

## (undated) DEVICE — SET LBLING PEN POLYPR LBL PAL

## (undated) DEVICE — SURGICAL PROCEDURE TRAY DRSG CHNG DISP

## (undated) DEVICE — CATHETER DIAG 5FR L110CM ID0.045IN VENT VASC PGTL 145 EXPO